# Patient Record
Sex: MALE | Race: WHITE | Employment: OTHER | URBAN - NONMETROPOLITAN AREA
[De-identification: names, ages, dates, MRNs, and addresses within clinical notes are randomized per-mention and may not be internally consistent; named-entity substitution may affect disease eponyms.]

---

## 2018-04-09 ENCOUNTER — DOCTOR'S OFFICE (OUTPATIENT)
Dept: URBAN - NONMETROPOLITAN AREA CLINIC 1 | Facility: CLINIC | Age: 68
Setting detail: OPHTHALMOLOGY
End: 2018-04-09
Payer: COMMERCIAL

## 2018-04-09 ENCOUNTER — RX ONLY (RX ONLY)
Age: 68
End: 2018-04-09

## 2018-04-09 DIAGNOSIS — S01.102A: ICD-10-CM

## 2018-04-09 DIAGNOSIS — H43.812: ICD-10-CM

## 2018-04-09 DIAGNOSIS — H43.813: ICD-10-CM

## 2018-04-09 DIAGNOSIS — H43.811: ICD-10-CM

## 2018-04-09 PROCEDURE — 92225 OPHTHALMOSCOPY EXTENDED INITIAL: CPT | Performed by: OPHTHALMOLOGY

## 2018-04-09 PROCEDURE — 99214 OFFICE O/P EST MOD 30 MIN: CPT | Performed by: OPHTHALMOLOGY

## 2018-04-09 PROCEDURE — 92134 CPTRZ OPH DX IMG PST SGM RTA: CPT | Performed by: OPHTHALMOLOGY

## 2018-04-09 ASSESSMENT — REFRACTION_MANIFEST
OU_VA: 20/
OD_VA3: 20/
OD_VA2: 20/
OU_VA: 20/
OS_VA2: 20/
OD_VA3: 20/
OS_VA2: 20/
OD_VA1: 20/
OS_VA2: 20/
OU_VA: 20/
OD_VA1: 20/
OD_VA2: 20/
OS_VA3: 20/
OD_VA1: 20/
OS_VA1: 20/
OS_VA3: 20/
OS_VA3: 20/
OD_VA2: 20/
OD_VA3: 20/
OS_VA1: 20/
OS_VA1: 20/

## 2018-04-09 ASSESSMENT — REFRACTION_CURRENTRX
OS_OVR_VA: 20/
OD_OVR_VA: 20/
OS_OVR_VA: 20/
OD_OVR_VA: 20/
OD_OVR_VA: 20/
OS_OVR_VA: 20/

## 2018-04-09 ASSESSMENT — CORNEAL TRAUMA - ABRASION: OD_ABRASION: (-)

## 2018-04-09 ASSESSMENT — VISUAL ACUITY
OS_BCVA: 20/20-1
OD_BCVA: 20/25

## 2018-04-09 ASSESSMENT — CONFRONTATIONAL VISUAL FIELD TEST (CVF)
OD_FINDINGS: FULL
OS_FINDINGS: FULL

## 2018-04-12 ENCOUNTER — DOCTOR'S OFFICE (OUTPATIENT)
Dept: URBAN - NONMETROPOLITAN AREA CLINIC 1 | Facility: CLINIC | Age: 68
Setting detail: OPHTHALMOLOGY
End: 2018-04-12
Payer: COMMERCIAL

## 2018-04-12 DIAGNOSIS — S01.102S: ICD-10-CM

## 2018-04-12 DIAGNOSIS — H43.813: ICD-10-CM

## 2018-04-12 PROCEDURE — 92250 FUNDUS PHOTOGRAPHY W/I&R: CPT | Performed by: OPHTHALMOLOGY

## 2018-04-12 PROCEDURE — 92014 COMPRE OPH EXAM EST PT 1/>: CPT | Performed by: OPHTHALMOLOGY

## 2018-04-12 PROCEDURE — 92235 FLUORESCEIN ANGRPH MLTIFRAME: CPT | Performed by: OPHTHALMOLOGY

## 2018-04-12 ASSESSMENT — REFRACTION_MANIFEST
OU_VA: 20/
OD_VA2: 20/
OS_VA3: 20/
OS_VA3: 20/
OS_VA2: 20/
OS_VA1: 20/
OD_VA1: 20/
OS_VA2: 20/
OS_VA1: 20/
OD_VA2: 20/
OS_VA2: 20/
OD_VA3: 20/
OD_VA1: 20/
OD_VA3: 20/
OD_VA2: 20/
OD_VA1: 20/
OU_VA: 20/
OS_VA3: 20/
OD_VA3: 20/
OS_VA1: 20/
OU_VA: 20/

## 2018-04-12 ASSESSMENT — REFRACTION_CURRENTRX
OS_OVR_VA: 20/
OS_OVR_VA: 20/
OD_OVR_VA: 20/
OS_OVR_VA: 20/
OD_OVR_VA: 20/
OD_OVR_VA: 20/

## 2018-04-12 ASSESSMENT — CONFRONTATIONAL VISUAL FIELD TEST (CVF)
OS_FINDINGS: FULL
OD_FINDINGS: FULL

## 2018-04-12 ASSESSMENT — VISUAL ACUITY
OS_BCVA: 20/25-
OD_BCVA: 20/30-

## 2018-04-12 ASSESSMENT — CORNEAL TRAUMA - ABRASION: OD_ABRASION: (-)

## 2018-04-23 ENCOUNTER — DOCTOR'S OFFICE (OUTPATIENT)
Dept: URBAN - NONMETROPOLITAN AREA CLINIC 1 | Facility: CLINIC | Age: 68
Setting detail: OPHTHALMOLOGY
End: 2018-04-23
Payer: COMMERCIAL

## 2018-04-23 DIAGNOSIS — H40.033: ICD-10-CM

## 2018-04-23 DIAGNOSIS — H43.813: ICD-10-CM

## 2018-04-23 DIAGNOSIS — S01.102A: ICD-10-CM

## 2018-04-23 PROCEDURE — 92132 CPTRZD OPH DX IMG ANT SGM: CPT | Performed by: OPHTHALMOLOGY

## 2018-04-23 PROCEDURE — 92020 GONIOSCOPY: CPT | Performed by: OPHTHALMOLOGY

## 2018-04-23 PROCEDURE — 92012 INTRM OPH EXAM EST PATIENT: CPT | Performed by: OPHTHALMOLOGY

## 2018-04-23 ASSESSMENT — REFRACTION_MANIFEST
OS_VA1: 20/
OS_VA2: 20/
OD_VA2: 20/
OU_VA: 20/
OS_VA1: 20/
OD_VA3: 20/
OS_VA3: 20/
OU_VA: 20/
OD_VA2: 20/
OD_VA3: 20/
OD_VA1: 20/
OS_VA2: 20/
OS_VA3: 20/
OD_VA1: 20/
OU_VA: 20/
OS_VA3: 20/
OD_VA3: 20/
OD_VA2: 20/
OD_VA1: 20/
OS_VA1: 20/
OS_VA2: 20/

## 2018-04-23 ASSESSMENT — REFRACTION_CURRENTRX
OD_OVR_VA: 20/
OS_OVR_VA: 20/
OD_OVR_VA: 20/
OS_OVR_VA: 20/
OD_OVR_VA: 20/
OS_OVR_VA: 20/

## 2018-04-23 ASSESSMENT — VISUAL ACUITY
OS_BCVA: 20/25-2
OD_BCVA: 20/25-2

## 2018-04-23 ASSESSMENT — CORNEAL TRAUMA - ABRASION: OD_ABRASION: (-)

## 2018-04-23 ASSESSMENT — CONFRONTATIONAL VISUAL FIELD TEST (CVF)
OS_FINDINGS: FULL
OD_FINDINGS: FULL

## 2018-04-25 ENCOUNTER — AMBUL SURGICAL CARE (OUTPATIENT)
Dept: URBAN - METROPOLITAN AREA SURGERY 29 | Facility: SURGERY | Age: 68
Setting detail: OPHTHALMOLOGY
End: 2018-04-25
Payer: COMMERCIAL

## 2018-04-25 DIAGNOSIS — H40.031: ICD-10-CM

## 2018-04-25 PROCEDURE — 66761 REVISION OF IRIS: CPT | Performed by: OPHTHALMOLOGY

## 2018-04-25 PROCEDURE — G8918 PT W/O PREOP ORDER IV AB PRO: HCPCS | Performed by: OPHTHALMOLOGY

## 2018-04-25 PROCEDURE — G8907 PT DOC NO EVENTS ON DISCHARG: HCPCS | Performed by: OPHTHALMOLOGY

## 2018-04-27 ENCOUNTER — AMBUL SURGICAL CARE (OUTPATIENT)
Dept: URBAN - METROPOLITAN AREA SURGERY 29 | Facility: SURGERY | Age: 68
Setting detail: OPHTHALMOLOGY
End: 2018-04-27
Payer: COMMERCIAL

## 2018-04-27 DIAGNOSIS — H40.032: ICD-10-CM

## 2018-04-27 PROCEDURE — G8907 PT DOC NO EVENTS ON DISCHARG: HCPCS | Performed by: OPHTHALMOLOGY

## 2018-04-27 PROCEDURE — G8918 PT W/O PREOP ORDER IV AB PRO: HCPCS | Performed by: OPHTHALMOLOGY

## 2018-04-27 PROCEDURE — 66761 REVISION OF IRIS: CPT | Performed by: OPHTHALMOLOGY

## 2018-05-18 ENCOUNTER — DOCTOR'S OFFICE (OUTPATIENT)
Dept: URBAN - NONMETROPOLITAN AREA CLINIC 1 | Facility: CLINIC | Age: 68
Setting detail: OPHTHALMOLOGY
End: 2018-05-18
Payer: COMMERCIAL

## 2018-05-18 ENCOUNTER — RX ONLY (RX ONLY)
Age: 68
End: 2018-05-18

## 2018-05-18 DIAGNOSIS — H01.001: ICD-10-CM

## 2018-05-18 DIAGNOSIS — H10.413: ICD-10-CM

## 2018-05-18 DIAGNOSIS — H01.002: ICD-10-CM

## 2018-05-18 DIAGNOSIS — H10.503: ICD-10-CM

## 2018-05-18 DIAGNOSIS — H10.501: ICD-10-CM

## 2018-05-18 DIAGNOSIS — H01.004: ICD-10-CM

## 2018-05-18 DIAGNOSIS — H01.005: ICD-10-CM

## 2018-05-18 DIAGNOSIS — H10.502: ICD-10-CM

## 2018-05-18 PROCEDURE — 92012 INTRM OPH EXAM EST PATIENT: CPT | Performed by: OPHTHALMOLOGY

## 2018-05-18 PROCEDURE — 83861 MICROFLUID ANALY TEARS: CPT | Performed by: OPHTHALMOLOGY

## 2018-05-18 ASSESSMENT — REFRACTION_MANIFEST
OD_VA3: 20/
OS_VA3: 20/
OS_VA1: 20/
OU_VA: 20/
OD_VA3: 20/
OD_VA1: 20/
OS_VA2: 20/
OD_VA2: 20/
OS_VA2: 20/
OD_VA3: 20/
OS_VA2: 20/
OU_VA: 20/
OS_VA1: 20/
OD_VA2: 20/
OD_VA1: 20/
OD_VA2: 20/
OD_VA1: 20/
OS_VA1: 20/
OU_VA: 20/

## 2018-05-18 ASSESSMENT — REFRACTION_CURRENTRX
OD_OVR_VA: 20/
OS_OVR_VA: 20/
OS_OVR_VA: 20/
OD_OVR_VA: 20/
OS_OVR_VA: 20/
OD_OVR_VA: 20/

## 2018-05-18 ASSESSMENT — VISUAL ACUITY
OD_BCVA: 20/30
OS_BCVA: 20/30

## 2018-05-18 ASSESSMENT — CONFRONTATIONAL VISUAL FIELD TEST (CVF)
OS_FINDINGS: FULL
OD_FINDINGS: FULL

## 2018-05-18 ASSESSMENT — LID EXAM ASSESSMENTS
OD_BLEPHARITIS: RLL RUL 1+ 2+
OS_BLEPHARITIS: LLL LUL 1+ 2+

## 2018-05-18 ASSESSMENT — CORNEAL TRAUMA - ABRASION: OD_ABRASION: (-)

## 2018-05-21 ENCOUNTER — RX ONLY (RX ONLY)
Age: 68
End: 2018-05-21

## 2018-05-21 ENCOUNTER — DOCTOR'S OFFICE (OUTPATIENT)
Dept: URBAN - NONMETROPOLITAN AREA CLINIC 1 | Facility: CLINIC | Age: 68
Setting detail: OPHTHALMOLOGY
End: 2018-05-21
Payer: COMMERCIAL

## 2018-05-21 DIAGNOSIS — H10.502: ICD-10-CM

## 2018-05-21 DIAGNOSIS — H43.813: ICD-10-CM

## 2018-05-21 DIAGNOSIS — H01.001: ICD-10-CM

## 2018-05-21 DIAGNOSIS — H26.033: ICD-10-CM

## 2018-05-21 DIAGNOSIS — H16.223: ICD-10-CM

## 2018-05-21 DIAGNOSIS — H40.033: ICD-10-CM

## 2018-05-21 DIAGNOSIS — H01.005: ICD-10-CM

## 2018-05-21 DIAGNOSIS — H01.002: ICD-10-CM

## 2018-05-21 DIAGNOSIS — H01.004: ICD-10-CM

## 2018-05-21 DIAGNOSIS — H10.501: ICD-10-CM

## 2018-05-21 PROCEDURE — 92014 COMPRE OPH EXAM EST PT 1/>: CPT | Performed by: OPHTHALMOLOGY

## 2018-05-21 PROCEDURE — 95004 PERQ TESTS W/ALRGNC XTRCS: CPT | Performed by: OPHTHALMOLOGY

## 2018-05-21 ASSESSMENT — REFRACTION_MANIFEST
OS_VA1: 20/
OS_VA1: 20/
OD_VA2: 20/
OS_VA3: 20/
OS_VA1: 20/
OS_VA2: 20/
OS_VA3: 20/
OD_VA3: 20/
OD_VA3: 20/
OU_VA: 20/
OD_VA1: 20/
OD_VA1: 20/
OS_VA2: 20/
OU_VA: 20/
OD_VA1: 20/
OU_VA: 20/
OS_VA3: 20/
OD_VA2: 20/
OS_VA2: 20/
OD_VA2: 20/
OD_VA3: 20/

## 2018-05-21 ASSESSMENT — CONFRONTATIONAL VISUAL FIELD TEST (CVF)
OS_FINDINGS: FULL
OD_FINDINGS: FULL

## 2018-05-21 ASSESSMENT — REFRACTION_CURRENTRX
OS_OVR_VA: 20/
OD_OVR_VA: 20/
OS_OVR_VA: 20/
OD_OVR_VA: 20/
OD_OVR_VA: 20/
OS_OVR_VA: 20/

## 2018-05-21 ASSESSMENT — CORNEAL TRAUMA - ABRASION: OD_ABRASION: (-)

## 2018-05-21 ASSESSMENT — LID EXAM ASSESSMENTS
OS_BLEPHARITIS: LLL LUL 1+ 2+
OD_BLEPHARITIS: RLL RUL 1+ 2+

## 2018-05-21 ASSESSMENT — VISUAL ACUITY
OD_BCVA: 20/30-1
OS_BCVA: 20/30

## 2018-09-29 ENCOUNTER — OFFICE VISIT (OUTPATIENT)
Dept: URGENT CARE | Facility: CLINIC | Age: 68
End: 2018-09-29
Payer: COMMERCIAL

## 2018-09-29 VITALS
DIASTOLIC BLOOD PRESSURE: 80 MMHG | HEART RATE: 69 BPM | WEIGHT: 215 LBS | TEMPERATURE: 98.1 F | RESPIRATION RATE: 16 BRPM | BODY MASS INDEX: 30.78 KG/M2 | OXYGEN SATURATION: 99 % | SYSTOLIC BLOOD PRESSURE: 131 MMHG | HEIGHT: 70 IN

## 2018-09-29 DIAGNOSIS — K08.89 PAIN, DENTAL: Primary | ICD-10-CM

## 2018-09-29 PROCEDURE — 99203 OFFICE O/P NEW LOW 30 MIN: CPT | Performed by: EMERGENCY MEDICINE

## 2018-09-29 PROCEDURE — S9088 SERVICES PROVIDED IN URGENT: HCPCS | Performed by: EMERGENCY MEDICINE

## 2018-09-29 RX ORDER — CLINDAMYCIN HYDROCHLORIDE 150 MG/1
150 CAPSULE ORAL EVERY 8 HOURS SCHEDULED
Qty: 21 CAPSULE | Refills: 0 | Status: SHIPPED | OUTPATIENT
Start: 2018-09-29 | End: 2018-10-06

## 2018-09-29 NOTE — PATIENT INSTRUCTIONS
Acute Dental Trauma   AMBULATORY CARE:   Acute dental trauma  is a serious injury to one or more parts of your mouth  Your injury may include damage to any of your teeth, the tooth socket, the tooth root, or your jaw  You can also have injuries to the soft tissues of your mouth  These include your tongue, cheeks, gums, and lips  Severe injuries can expose the soft pulp inside the tooth  Common signs and symptoms include the following:   · A tooth that is cracked, chipped, loose, out of place, or missing    · A sharp or rough edge on your tooth    · Bleeding from your gums, lips, face, or mouth    · Trouble moving your jaw or mouth    · A change in the way your teeth fit together when you close your mouth  Call 911 for any of the following:   · You have trouble breathing  Seek care immediately if:   · You lose one or more of your teeth, or your tooth moves out of place  · You have severe bleeding in your mouth that does not stop  Contact your healthcare provider if:   · You have a fever  · You have new symptoms, or your symptoms become worse  · You feel pain when air gets in contact with your damaged tooth  · You have tooth pain when you eat foods that are hot, cold, sweet, or sour  · Your tooth's color becomes darker  · You have questions or concern about your condition or care  Treatment  may depend on what part of your mouth is injured  Medicine may be given to decrease pain and prevent an infection  You may need stitches to repair large cuts or openings in your skin or mouth  You may also need a tetanus shot to prevent bacteria from getting into your wound  You may need surgery to replace or repair a broken tooth  Medicines: You may  need any of the following:  · Antibiotics  help treat or prevent a bacterial infection  · Acetaminophen  decreases pain and fever  It is available without a doctor's order  Ask how much to take and how often to take it  Follow directions   Read the labels of all other medicines you are using to see if they also contain acetaminophen, or ask your doctor or pharmacist  Acetaminophen can cause liver damage if not taken correctly  Do not use more than 4 grams (4,000 milligrams) total of acetaminophen in one day  · Take your medicine as directed  Contact your healthcare provider if you think your medicine is not helping or if you have side effects  Tell him or her if you are allergic to any medicine  Keep a list of the medicines, vitamins, and herbs you take  Include the amounts, and when and why you take them  Bring the list or the pill bottles to follow-up visits  Carry your medicine list with you in case of an emergency  Self-care:   · Apply ice  on your jaw or cheek for 15 to 20 minutes every hour or as directed  Use an ice pack, or put crushed ice in a plastic bag  Cover it with a towel  Ice helps prevent tissue damage and decreases swelling and pain  · Do not use your damaged tooth  Chewing food on your damaged tooth may put too much pressure on it and worsen your injury  · Eat soft foods or drink liquids  Soft foods and liquids may be easier to eat until your injury heals  Soft foods include applesauce, pudding, mashed potatoes, gelatin, or ice cream      · Keep your wounds clean  Use prescribed mouthwash as directed or gargle with a salt water solution  Mix 1 teaspoon of salt and 1 cup of warm water  You can also clean your wounds with hydrogen peroxide swabs  Ask your healthcare provider for more information on how to clean your wounds  · Wear protective gear when you play sports  Always wear a helmet and mouth guard that meet safety standards  These will prevent damage to your gums, teeth, and the bones that support your mouth  Follow up with your healthcare provider as directed:  Write down your questions so you remember to ask them during your visits    © 2017 [employer] Information is for End User's use only and may not be sold, redistributed or otherwise used for commercial purposes  All illustrations and images included in CareNotes® are the copyrighted property of [employer]  or Jacob[employer] Fitzpatrick  The above information is an  only  It is not intended as medical advice for individual conditions or treatments  Talk to your doctor, nurse or pharmacist before following any medical regimen to see if it is safe and effective for you

## 2018-09-29 NOTE — PROGRESS NOTES
[facility]        NAME: Katerine Tenorio is a 78 y o  male  : 1950    MRN: [id number]  DATE: 2018  TIME: 1:52 PM    Assessment and Plan   Pain, dental [K08 89]  1  Pain, dental  clindamycin (CLEOCIN) 150 mg capsule     After informed verbal consent dental block 1% lidocaine with epi no complications  Patient Instructions     Patient Instructions   Acute Dental Trauma   AMBULATORY CARE:   Acute dental trauma  is a serious injury to one or more parts of your mouth  Your injury may include damage to any of your teeth, the tooth socket, the tooth root, or your jaw  You can also have injuries to the soft tissues of your mouth  These include your tongue, cheeks, gums, and lips  Severe injuries can expose the soft pulp inside the tooth  Common signs and symptoms include the following:   · A tooth that is cracked, chipped, loose, out of place, or missing    · A sharp or rough edge on your tooth    · Bleeding from your gums, lips, face, or mouth    · Trouble moving your jaw or mouth    · A change in the way your teeth fit together when you close your mouth  Call 911 for any of the following:   · You have trouble breathing  Seek care immediately if:   · You lose one or more of your teeth, or your tooth moves out of place  · You have severe bleeding in your mouth that does not stop  Contact your healthcare provider if:   · You have a fever  · You have new symptoms, or your symptoms become worse  · You feel pain when air gets in contact with your damaged tooth  · You have tooth pain when you eat foods that are hot, cold, sweet, or sour  · Your tooth's color becomes darker  · You have questions or concern about your condition or care  Treatment  may depend on what part of your mouth is injured  Medicine may be given to decrease pain and prevent an infection  You may need stitches to repair large cuts or openings in your skin or mouth   You may also need a tetanus shot to prevent bacteria from getting into your wound  You may need surgery to replace or repair a broken tooth  Medicines: You may  need any of the following:  · Antibiotics  help treat or prevent a bacterial infection  · Acetaminophen  decreases pain and fever  It is available without a doctor's order  Ask how much to take and how often to take it  Follow directions  Read the labels of all other medicines you are using to see if they also contain acetaminophen, or ask your doctor or pharmacist  Acetaminophen can cause liver damage if not taken correctly  Do not use more than 4 grams (4,000 milligrams) total of acetaminophen in one day  · Take your medicine as directed  Contact your healthcare provider if you think your medicine is not helping or if you have side effects  Tell him or her if you are allergic to any medicine  Keep a list of the medicines, vitamins, and herbs you take  Include the amounts, and when and why you take them  Bring the list or the pill bottles to follow-up visits  Carry your medicine list with you in case of an emergency  Self-care:   · Apply ice  on your jaw or cheek for 15 to 20 minutes every hour or as directed  Use an ice pack, or put crushed ice in a plastic bag  Cover it with a towel  Ice helps prevent tissue damage and decreases swelling and pain  · Do not use your damaged tooth  Chewing food on your damaged tooth may put too much pressure on it and worsen your injury  · Eat soft foods or drink liquids  Soft foods and liquids may be easier to eat until your injury heals  Soft foods include applesauce, pudding, mashed potatoes, gelatin, or ice cream      · Keep your wounds clean  Use prescribed mouthwash as directed or gargle with a salt water solution  Mix 1 teaspoon of salt and 1 cup of warm water  You can also clean your wounds with hydrogen peroxide swabs  Ask your healthcare provider for more information on how to clean your wounds      · Wear protective gear when you play sports  Always wear a helmet and mouth guard that meet safety standards  These will prevent damage to your gums, teeth, and the bones that support your mouth  Follow up with your healthcare provider as directed:  Write down your questions so you remember to ask them during your visits  © 2017 [employer] Information is for End User's use only and may not be sold, redistributed or otherwise used for commercial purposes  All illustrations and images included in CareNotes® are the copyrighted property of [employer]  or [employer] Fitzpatrick  The above information is an  only  It is not intended as medical advice for individual conditions or treatments  Talk to your doctor, nurse or pharmacist before following any medical regimen to see if it is safe and effective for you  Follow up with PCP in 3-5 days  Proceed to  ER if symptoms worsen  Chief Complaint     Chief Complaint   Patient presents with    Dental Pain     broke tooth a couple of days ago, cant get into dentist until monday         History of Present Illness       Patient complains of worsening pain left lower tooth since the tooth broke off few days ago  He called the dentist but is unable to get in until 2 days from now  He denies fever chills sweats  Review of Systems   Review of Systems   Constitutional: Negative for fever  HENT: Negative for congestion, ear discharge, ear pain, facial swelling, hearing loss, rhinorrhea and sinus pressure  Respiratory: Negative for cough, shortness of breath and wheezing  Gastrointestinal: Negative for diarrhea and vomiting  Musculoskeletal: Negative for neck stiffness  Skin: Negative for pallor  Neurological: Negative for headaches           Current Medications       Current Outpatient Prescriptions:     UNKNOWN TO PATIENT, , Disp: , Rfl:     clindamycin (CLEOCIN) 150 mg capsule, Take 1 capsule (150 mg total) by mouth every 8 (eight) hours for 7 days, Disp: 21 capsule, Rfl: 0    Current Allergies     Allergies as of 09/29/2018 - Reviewed 09/29/2018   Allergen Reaction Noted    Ibuprofen Anaphylaxis 09/29/2018            The following portions of the patient's history were reviewed and updated as appropriate: allergies, current medications, past family history, past medical history, past social history, past surgical history and problem list      Past Medical History:   Diagnosis Date    Heart problem     Hypertension        Past Surgical History:   Procedure Laterality Date    APPENDECTOMY      CAROTID ARTERY ANGIOPLASTY      CARPAL TUNNEL RELEASE         No family history on file  Medications have been verified  Objective   /80   Pulse 69   Temp 98 1 °F (36 7 °C) (Tympanic)   Resp 16   Ht 5' 10" (1 778 m)   Wt 97 5 kg (215 lb)   SpO2 99%   BMI 30 85 kg/m²        Physical Exam     Physical Exam   Constitutional: He is oriented to person, place, and time  He appears well-developed and well-nourished  No distress  HENT:   Head: Normocephalic and atraumatic  Right Ear: Tympanic membrane, external ear and ear canal normal    Left Ear: Tympanic membrane, external ear and ear canal normal    Nose: Mucosal edema and rhinorrhea present  Mouth/Throat: No oral lesions  Abnormal dentition  Dental caries present  No dental abscesses or uvula swelling  No posterior oropharyngeal erythema  Eyes: Pupils are equal, round, and reactive to light  Conjunctivae are normal    Neck: Neck supple  Cardiovascular: Normal rate, regular rhythm and normal heart sounds  Pulmonary/Chest: Effort normal and breath sounds normal    Abdominal: Soft  Bowel sounds are normal    Musculoskeletal: Normal range of motion  Neurological: He is alert and oriented to person, place, and time  Skin: Skin is warm and dry  He is not diaphoretic  No pallor  Psychiatric: He has a normal mood and affect     Nursing note and vitals reviewed

## 2018-10-19 ENCOUNTER — DOCTOR'S OFFICE (OUTPATIENT)
Dept: URBAN - NONMETROPOLITAN AREA CLINIC 1 | Facility: CLINIC | Age: 68
Setting detail: OPHTHALMOLOGY
End: 2018-10-19
Payer: COMMERCIAL

## 2018-10-19 DIAGNOSIS — H16.222: ICD-10-CM

## 2018-10-19 DIAGNOSIS — H43.813: ICD-10-CM

## 2018-10-19 DIAGNOSIS — H43.811: ICD-10-CM

## 2018-10-19 DIAGNOSIS — H40.033: ICD-10-CM

## 2018-10-19 DIAGNOSIS — H26.033: ICD-10-CM

## 2018-10-19 DIAGNOSIS — H16.223: ICD-10-CM

## 2018-10-19 DIAGNOSIS — H43.812: ICD-10-CM

## 2018-10-19 PROCEDURE — 92014 COMPRE OPH EXAM EST PT 1/>: CPT | Performed by: OPHTHALMOLOGY

## 2018-10-19 PROCEDURE — 83861 MICROFLUID ANALY TEARS: CPT | Performed by: OPHTHALMOLOGY

## 2018-10-19 PROCEDURE — 92226 OPHTHALMOSCOPY EXT SUBSEQUENT: CPT | Performed by: OPHTHALMOLOGY

## 2018-10-19 PROCEDURE — 92134 CPTRZ OPH DX IMG PST SGM RTA: CPT | Performed by: OPHTHALMOLOGY

## 2018-10-19 ASSESSMENT — VISUAL ACUITY
OS_BCVA: 20/25-1
OD_BCVA: 20/30-1

## 2018-10-19 ASSESSMENT — REFRACTION_CURRENTRX
OS_OVR_VA: 20/
OD_OVR_VA: 20/

## 2018-10-19 ASSESSMENT — REFRACTION_MANIFEST
OD_VA2: 20/
OS_VA3: 20/
OS_VA2: 20/
OS_VA1: 20/
OD_VA2: 20/
OU_VA: 20/
OS_VA3: 20/
OU_VA: 20/
OS_VA1: 20/
OD_VA1: 20/
OS_VA2: 20/
OD_VA1: 20/
OD_VA3: 20/
OD_VA3: 20/

## 2018-10-19 ASSESSMENT — CORNEAL TRAUMA - ABRASION: OD_ABRASION: (-)

## 2018-10-19 ASSESSMENT — CONFRONTATIONAL VISUAL FIELD TEST (CVF)
OD_FINDINGS: FULL
OS_FINDINGS: FULL

## 2018-10-19 ASSESSMENT — LID EXAM ASSESSMENTS
OS_BLEPHARITIS: LLL LUL 1+ 2+
OD_BLEPHARITIS: RLL RUL 1+ 2+

## 2021-04-08 DIAGNOSIS — Z23 ENCOUNTER FOR IMMUNIZATION: ICD-10-CM

## 2021-08-24 ENCOUNTER — RX ONLY (RX ONLY)
Age: 71
End: 2021-08-24

## 2021-08-24 ENCOUNTER — DOCTOR'S OFFICE (OUTPATIENT)
Dept: URBAN - NONMETROPOLITAN AREA CLINIC 1 | Facility: CLINIC | Age: 71
Setting detail: OPHTHALMOLOGY
End: 2021-08-24
Payer: COMMERCIAL

## 2021-08-24 VITALS — HEIGHT: 60 IN

## 2021-08-24 DIAGNOSIS — B30.3: ICD-10-CM

## 2021-08-24 DIAGNOSIS — B30.1: ICD-10-CM

## 2021-08-24 DIAGNOSIS — B30.0: ICD-10-CM

## 2021-08-24 DIAGNOSIS — B30.2: ICD-10-CM

## 2021-08-24 PROCEDURE — 92012 INTRM OPH EXAM EST PATIENT: CPT | Performed by: OPHTHALMOLOGY

## 2021-08-24 ASSESSMENT — CONFRONTATIONAL VISUAL FIELD TEST (CVF)
OD_FINDINGS: FULL
OS_FINDINGS: FULL

## 2021-08-24 ASSESSMENT — VISUAL ACUITY
OD_BCVA: 20/30-2
OS_BCVA: 20/30+1

## 2021-10-01 ENCOUNTER — DOCTOR'S OFFICE (OUTPATIENT)
Dept: URBAN - NONMETROPOLITAN AREA CLINIC 1 | Facility: CLINIC | Age: 71
Setting detail: OPHTHALMOLOGY
End: 2021-10-01
Payer: COMMERCIAL

## 2021-10-01 DIAGNOSIS — B30.3: ICD-10-CM

## 2021-10-01 DIAGNOSIS — B30.0: ICD-10-CM

## 2021-10-01 DIAGNOSIS — B30.1: ICD-10-CM

## 2021-10-01 DIAGNOSIS — H26.033: ICD-10-CM

## 2021-10-01 DIAGNOSIS — B30.2: ICD-10-CM

## 2021-10-01 PROCEDURE — 92014 COMPRE OPH EXAM EST PT 1/>: CPT | Performed by: OPHTHALMOLOGY

## 2021-10-01 ASSESSMENT — LID EXAM ASSESSMENTS
OD_BLEPHARITIS: ABSENT
OS_BLEPHARITIS: ABSENT

## 2021-10-01 ASSESSMENT — CONFRONTATIONAL VISUAL FIELD TEST (CVF)
OS_FINDINGS: FULL
OD_FINDINGS: FULL

## 2021-10-05 ASSESSMENT — KERATOMETRY
OS_K1POWER_DIOPTERS: 40.50
OS_K2POWER_DIOPTERS: 41.25
OS_AXISANGLE_DEGREES: 113
OD_AXISANGLE_DEGREES: 042
OD_K1POWER_DIOPTERS: 40.75
OD_K2POWER_DIOPTERS: 41.00

## 2021-10-05 ASSESSMENT — REFRACTION_AUTOREFRACTION
OD_SPHERE: +2.00
OS_CYLINDER: 0.00
OS_SPHERE: +0.50
OD_AXIS: 162
OD_CYLINDER: -1.75

## 2021-10-05 ASSESSMENT — VISUAL ACUITY
OD_BCVA: 20/25-1
OS_BCVA: 20/25-1

## 2021-10-05 ASSESSMENT — SPHEQUIV_DERIVED
OS_SPHEQUIV: 0.5
OD_SPHEQUIV: 1.125

## 2021-10-05 ASSESSMENT — AXIALLENGTH_DERIVED
OD_AL: 24.1298
OS_AL: 24.3872

## 2021-12-07 ENCOUNTER — DOCTOR'S OFFICE (OUTPATIENT)
Dept: URBAN - NONMETROPOLITAN AREA CLINIC 1 | Facility: CLINIC | Age: 71
Setting detail: OPHTHALMOLOGY
End: 2021-12-07
Payer: COMMERCIAL

## 2021-12-07 DIAGNOSIS — H25.12: ICD-10-CM

## 2021-12-07 PROCEDURE — 92136 OPHTHALMIC BIOMETRY: CPT | Performed by: OPHTHALMOLOGY

## 2021-12-14 ENCOUNTER — AMBUL SURGICAL CARE (OUTPATIENT)
Dept: URBAN - NONMETROPOLITAN AREA SURGERY 1 | Facility: SURGERY | Age: 71
Setting detail: OPHTHALMOLOGY
End: 2021-12-14
Payer: COMMERCIAL

## 2021-12-14 DIAGNOSIS — H25.12: ICD-10-CM

## 2021-12-14 DIAGNOSIS — H25.012: ICD-10-CM

## 2021-12-14 PROCEDURE — G8918 PT W/O PREOP ORDER IV AB PRO: HCPCS | Performed by: OPHTHALMOLOGY

## 2021-12-14 PROCEDURE — G8907 PT DOC NO EVENTS ON DISCHARG: HCPCS | Performed by: OPHTHALMOLOGY

## 2021-12-14 PROCEDURE — 66984 XCAPSL CTRC RMVL W/O ECP: CPT | Performed by: OPHTHALMOLOGY

## 2021-12-15 ENCOUNTER — DOCTOR'S OFFICE (OUTPATIENT)
Dept: URBAN - NONMETROPOLITAN AREA CLINIC 1 | Facility: CLINIC | Age: 71
Setting detail: OPHTHALMOLOGY
End: 2021-12-15
Payer: COMMERCIAL

## 2021-12-15 ENCOUNTER — DOCTOR'S OFFICE (OUTPATIENT)
Dept: URBAN - NONMETROPOLITAN AREA CLINIC 1 | Facility: CLINIC | Age: 71
Setting detail: OPHTHALMOLOGY
End: 2021-12-15

## 2021-12-15 DIAGNOSIS — H26.031: ICD-10-CM

## 2021-12-15 DIAGNOSIS — Z96.1: ICD-10-CM

## 2021-12-15 PROCEDURE — 99024 POSTOP FOLLOW-UP VISIT: CPT | Performed by: OPTOMETRIST

## 2021-12-15 PROCEDURE — 92136 OPHTHALMIC BIOMETRY: CPT | Performed by: OPHTHALMOLOGY

## 2021-12-15 ASSESSMENT — REFRACTION_AUTOREFRACTION
OS_AXIS: 155
OS_CYLINDER: -2.25
OS_SPHERE: +1.75

## 2021-12-15 ASSESSMENT — KERATOMETRY
OD_K2POWER_DIOPTERS: 41.00
OS_K2POWER_DIOPTERS: 41.25
OS_K1POWER_DIOPTERS: 40.50
OD_K1POWER_DIOPTERS: 40.75
OD_AXISANGLE_DEGREES: 042
OS_AXISANGLE_DEGREES: 113

## 2021-12-15 ASSESSMENT — SPHEQUIV_DERIVED: OS_SPHEQUIV: 0.625

## 2021-12-15 ASSESSMENT — VISUAL ACUITY
OS_BCVA: 20/40-2
OD_BCVA: 20/70

## 2021-12-15 ASSESSMENT — TONOMETRY: OS_IOP_MMHG: 15

## 2021-12-15 ASSESSMENT — LID EXAM ASSESSMENTS
OS_BLEPHARITIS: ABSENT
OD_BLEPHARITIS: ABSENT

## 2021-12-15 ASSESSMENT — AXIALLENGTH_DERIVED: OS_AL: 24.3353

## 2021-12-22 ENCOUNTER — RX ONLY (RX ONLY)
Age: 71
End: 2021-12-22

## 2021-12-22 ENCOUNTER — DOCTOR'S OFFICE (OUTPATIENT)
Dept: URBAN - NONMETROPOLITAN AREA CLINIC 1 | Facility: CLINIC | Age: 71
Setting detail: OPHTHALMOLOGY
End: 2021-12-22
Payer: COMMERCIAL

## 2021-12-22 DIAGNOSIS — Z96.1: ICD-10-CM

## 2021-12-22 DIAGNOSIS — H26.031: ICD-10-CM

## 2021-12-22 PROBLEM — H01.001 BLEPHARITIS; RIGHT UPPER LID, RIGHT LOWER LID, LEFT UPPER LID, LEFT LOWER LID: Status: ACTIVE | Noted: 2018-05-18

## 2021-12-22 PROBLEM — H10.413 ALLERGIC CONJUNCTIVITIS; BOTH EYES: Status: ACTIVE | Noted: 2018-04-09

## 2021-12-22 PROBLEM — H31.001 CHORIORETINAL SCARS; RIGHT EYE: Status: ACTIVE | Noted: 2021-10-01

## 2021-12-22 PROBLEM — B30.1 VIRAL CONJUNCTIVITIS: Status: ACTIVE | Noted: 2021-08-24

## 2021-12-22 PROBLEM — B30.0 VIRAL CONJUNCTIVITIS: Status: ACTIVE | Noted: 2021-08-24

## 2021-12-22 PROBLEM — H43.812 POST VITREOUS DETACHMENT; RIGHT EYE, LEFT EYE: Status: ACTIVE | Noted: 2018-04-09

## 2021-12-22 PROBLEM — H40.033 NARROW ANGLE; BOTH EYES: Status: ACTIVE | Noted: 2018-04-23

## 2021-12-22 PROBLEM — H43.811 POST VITREOUS DETACHMENT; RIGHT EYE, LEFT EYE: Status: ACTIVE | Noted: 2018-04-09

## 2021-12-22 PROBLEM — H01.005 BLEPHARITIS; RIGHT UPPER LID, RIGHT LOWER LID, LEFT UPPER LID, LEFT LOWER LID: Status: ACTIVE | Noted: 2018-05-18

## 2021-12-22 PROBLEM — H16.222 KERATOCONJUNCTIVITIS SICCA; RIGHT EYE, LEFT EYE: Status: ACTIVE | Noted: 2018-04-09

## 2021-12-22 PROBLEM — H01.004 BLEPHARITIS; RIGHT UPPER LID, RIGHT LOWER LID, LEFT UPPER LID, LEFT LOWER LID: Status: ACTIVE | Noted: 2018-05-18

## 2021-12-22 PROBLEM — H43.813 POSTERIOR VITREOUS DETACHMENT; BOTH EYES: Status: ACTIVE | Noted: 2018-10-19

## 2021-12-22 PROBLEM — H16.221 KERATOCONJUNCTIVITIS SICCA; RIGHT EYE, LEFT EYE: Status: ACTIVE | Noted: 2018-04-09

## 2021-12-22 PROBLEM — S01.102A: Status: ACTIVE | Noted: 2018-04-09

## 2021-12-22 PROBLEM — B30.2 VIRAL CONJUNCTIVITIS: Status: ACTIVE | Noted: 2021-08-24

## 2021-12-22 PROBLEM — B30.3 VIRAL CONJUNCTIVITIS: Status: ACTIVE | Noted: 2021-08-24

## 2021-12-22 PROBLEM — H01.002 BLEPHARITIS; RIGHT UPPER LID, RIGHT LOWER LID, LEFT UPPER LID, LEFT LOWER LID: Status: ACTIVE | Noted: 2018-05-18

## 2021-12-22 PROCEDURE — 99024 POSTOP FOLLOW-UP VISIT: CPT | Performed by: OPHTHALMOLOGY

## 2021-12-22 ASSESSMENT — LID EXAM ASSESSMENTS
OD_BLEPHARITIS: ABSENT
OS_BLEPHARITIS: ABSENT

## 2021-12-22 ASSESSMENT — REFRACTION_AUTOREFRACTION
OD_SPHERE: +1.00
OS_CYLINDER: -3.00
OS_SPHERE: +1.25
OS_AXIS: 020
OD_CYLINDER: 0.00

## 2021-12-22 ASSESSMENT — KERATOMETRY
OS_K1POWER_DIOPTERS: 40.25
OS_AXISANGLE_DEGREES: 96
OD_AXISANGLE_DEGREES: 077
OS_K2POWER_DIOPTERS: 41.25
OD_K1POWER_DIOPTERS: 41.00
OD_K2POWER_DIOPTERS: 41.75

## 2021-12-22 ASSESSMENT — VISUAL ACUITY
OS_BCVA: 20/40-2
OD_BCVA: 20/20-1

## 2021-12-22 ASSESSMENT — AXIALLENGTH_DERIVED
OS_AL: 24.754
OD_AL: 23.9894

## 2021-12-22 ASSESSMENT — SPHEQUIV_DERIVED
OS_SPHEQUIV: -0.25
OD_SPHEQUIV: 1

## 2022-05-07 ENCOUNTER — HOSPITAL ENCOUNTER (EMERGENCY)
Facility: HOSPITAL | Age: 72
Discharge: HOME | End: 2022-05-07
Attending: EMERGENCY MEDICINE
Payer: COMMERCIAL

## 2022-05-07 ENCOUNTER — APPOINTMENT (EMERGENCY)
Dept: RADIOLOGY | Facility: HOSPITAL | Age: 72
End: 2022-05-07
Payer: COMMERCIAL

## 2022-05-07 ENCOUNTER — APPOINTMENT (EMERGENCY)
Dept: CT IMAGING | Facility: HOSPITAL | Age: 72
End: 2022-05-07
Payer: COMMERCIAL

## 2022-05-07 VITALS
TEMPERATURE: 97.6 F | SYSTOLIC BLOOD PRESSURE: 128 MMHG | RESPIRATION RATE: 22 BRPM | OXYGEN SATURATION: 94 % | DIASTOLIC BLOOD PRESSURE: 67 MMHG | HEART RATE: 62 BPM

## 2022-05-07 DIAGNOSIS — M67.911 ROTATOR CUFF DYSFUNCTION, RIGHT: Primary | ICD-10-CM

## 2022-05-07 LAB — GLUCOSE SERPL-MCNC: 88 MG/DL (ref 65–140)

## 2022-05-07 PROCEDURE — 93005 ELECTROCARDIOGRAM TRACING: CPT

## 2022-05-07 PROCEDURE — 99285 EMERGENCY DEPT VISIT HI MDM: CPT | Performed by: EMERGENCY MEDICINE

## 2022-05-07 PROCEDURE — 70450 CT HEAD/BRAIN W/O DYE: CPT

## 2022-05-07 PROCEDURE — 73030 X-RAY EXAM OF SHOULDER: CPT

## 2022-05-07 PROCEDURE — 72125 CT NECK SPINE W/O DYE: CPT

## 2022-05-07 PROCEDURE — 99284 EMERGENCY DEPT VISIT MOD MDM: CPT

## 2022-05-07 PROCEDURE — 71045 X-RAY EXAM CHEST 1 VIEW: CPT

## 2022-05-07 PROCEDURE — 82948 REAGENT STRIP/BLOOD GLUCOSE: CPT

## 2022-05-07 RX ORDER — ACETAMINOPHEN 325 MG/1
650 TABLET ORAL ONCE
Status: COMPLETED | OUTPATIENT
Start: 2022-05-07 | End: 2022-05-07

## 2022-05-07 RX ADMIN — ACETAMINOPHEN 650 MG: 325 TABLET ORAL at 16:33

## 2022-05-07 NOTE — DISCHARGE INSTRUCTIONS
Please use sling while awake for next several days  Do not sleep in it  We recommend follow-up with orthopedics  Use Tylenol for pain  Apply ice to the area 4 to 6 times a day for 15 to 20 minutes at a time  Return with any worsening  Your imaging studies have been preliminarily reviewed by the emergency department  Further review by Radiology is pending at this time  If there is a discrepancy or a finding of additional concern identified, we will attempt to contact you at the number you have provided us  If you do not hear from us, follow-up with your primary care provider within 1-2 weeks is always recommended to ensure that all findings were normal or as initially reported    Your results may also be available on [facility]

## 2022-05-07 NOTE — ED PROVIDER NOTES
Emergency Department Trauma Note  Shena Cerda 80 y o  male MRN: [id number]  Unit/Bed#: ED 07/ED 07 Encounter: [id number]      Trauma Alert: Trauma Acuity: Trauma Evaluation  Model of Arrival: Mode of Arrival: Direct from scene via    Trauma Team: Current Providers  Attending Provider: Sridhar Ervin DO  Registered Nurse: Efren Hernandez RN  Consultants:     None      History of Present Illness     Chief Complaint:   Chief Complaint   Patient presents with    Fall     Pt fell in wet grass, rolled down hill in yard, pt reports head ache neck pain and right shoulder pain pt is on aspirin daily      HPI:  Shena Cerda is a 80 y o  male who presents with   Mechanism:Details of Incident: patient slid on wet grass and fell down hill, struck head and right shoulder  Injury Date: 05/07/22 Injury Time: 1700 Injury Occurence Location - [address] [place] [address] Way: sanjana     71-year-old male presents emergency room for evaluation of his right shoulder and neck after he apparently slid and fell down of 5 ft embankment yesterday  Patient reports that he fell onto his right side  Patient reports that he was walking along the road picking up cans when he slipped and fell  He did not strike his head  He did not lose consciousness  He was ambulatory after the incident  Tried use ice on his right shoulder yesterday but reports that it became more severe with pain today and elected to come to the emergency room for further evaluation  Patient reports that he has a history of previous blunt trauma to the right shoulder which caused a clavicle fracture when he was younger  He reports that it was never operated upon  Patient denies any chest pain or shortness of breath at this time  He denies any abdominal pain  He has had no nausea vomiting or difficulty ambulating  Patient reports he drove [facility] emergency room without difficulty    Patient is on anti-platelet therapy secondary to previous carotid endarterectomy on the right       History provided by:  Patient  Fall  Mechanism of injury: fall    Injury location:  Shoulder/arm and head/neck  Head/neck injury location:  R neck  Shoulder/arm injury location:  R shoulder  Incident location:  Street  Arrived directly from scene: no    Fall:     Fall occurred:  Walking and tripped    Impact surface:  Grass    Point of impact:  Unable to specify  Suspicion of alcohol use: no    Suspicion of drug use: no    Prior to arrival data:     Bystander interventions:  None    Blood loss:  None    Responsiveness at scene:  Alert    Orientation at scene:  Person, place, situation and time    Loss of consciousness: no      Amnesic to event: no      Airway condition since incident:  Stable    Breathing condition since incident:  Stable    Circulation condition since incident:  Stable    Mental status condition since incident:  Stable    Disability condition since incident:  Stable  Associated symptoms: neck pain    Associated symptoms: no abdominal pain, no back pain, no chest pain, no difficulty breathing, no headaches, no loss of consciousness, no nausea, no seizures and no vomiting      Review of Systems   Constitutional: Negative  Negative for activity change and diaphoresis  HENT: Negative  Negative for dental problem and trouble swallowing  Eyes: Negative  Negative for visual disturbance  Respiratory: Negative  Negative for chest tightness, shortness of breath and wheezing  Cardiovascular: Negative  Negative for chest pain and palpitations  Gastrointestinal: Negative  Negative for abdominal distention, abdominal pain, nausea and vomiting  Genitourinary: Negative  Negative for flank pain and hematuria  Musculoskeletal: Positive for arthralgias and neck pain  Negative for back pain, gait problem, joint swelling and myalgias  Skin: Negative  Negative for wound  Neurological: Negative    Negative for dizziness, seizures, loss of consciousness, light-headedness and headaches  Hematological: Negative  Does not bruise/bleed easily  Psychiatric/Behavioral: Negative  All other systems reviewed and are negative  Historical Information     Immunizations:   Immunization History   Administered Date(s) Administered    COVID-19 MODERNA VACC 0 5 ML IM 03/11/2021, 04/08/2021       Past Medical History:   Diagnosis Date    Heart problem     Hypertension      History reviewed  No pertinent family history  Past Surgical History:   Procedure Laterality Date    APPENDECTOMY      CAROTID ARTERY ANGIOPLASTY      CARPAL TUNNEL RELEASE       Social History     Tobacco Use    Smoking status: Former Smoker    Smokeless tobacco: Never Used   Vaping Use    Vaping Use: Never used   Substance Use Topics    Alcohol use: Not on file    Drug use: Not on file     E-Cigarette/Vaping    E-Cigarette Use Never User      E-Cigarette/Vaping Substances       Family History: non-contributory    Meds/Allergies   Prior to Admission Medications   Prescriptions Last Dose Informant Patient Reported? Taking? UNKNOWN TO PATIENT   Yes No      Facility-Administered Medications: None       Allergies   Allergen Reactions    Ibuprofen Anaphylaxis       PHYSICAL EXAM      Objective   Vitals:   First set: Temperature: 97 6 °F (36 4 °C) (05/07/22 1620)  Pulse: 65 (05/07/22 1620)  Respirations: 20 (05/07/22 1620)  Blood Pressure: 151/71 (05/07/22 1620)  SpO2: 94 % (05/07/22 1620)    Primary Survey:   (A) Airway:  Normal  (B) Breathing:  Normal  (C) Circulation: Pulses:   normal  (D) Disabliity:  GCS Total:  15  (E) Expose:  Completed    Secondary Survey: (Click on Physical Exam tab above)  Physical Exam  Vitals and nursing note reviewed  Constitutional:       Appearance: Normal appearance  He is well-developed  He is not toxic-appearing  HENT:      Head: Normocephalic and atraumatic  Hair is normal       Jaw: No pain on movement        Right Ear: External ear normal       Left Ear: External ear normal       Nose: Nose normal       Mouth/Throat:      Mouth: Mucous membranes are moist       Pharynx: Oropharynx is clear  Eyes:      General: Lids are normal  No scleral icterus  Extraocular Movements: Extraocular movements intact  Conjunctiva/sclera: Conjunctivae normal       Pupils: Pupils are equal, round, and reactive to light  Cardiovascular:      Rate and Rhythm: Normal rate and regular rhythm  Heart sounds: Normal heart sounds  No murmur heard  Pulmonary:      Effort: Pulmonary effort is normal  No respiratory distress  Breath sounds: Normal breath sounds  No decreased breath sounds, wheezing, rhonchi or rales  Abdominal:      General: Abdomen is flat  There is no distension  Palpations: Abdomen is soft  Abdomen is not rigid  Tenderness: There is no abdominal tenderness  There is no right CVA tenderness, left CVA tenderness or guarding  Musculoskeletal:         General: No signs of injury  Right shoulder: Deformity (Possibly chronic secondary to previous fracture), tenderness and bony tenderness present  No swelling, laceration or crepitus  Decreased range of motion  Normal strength  Left shoulder: No swelling, deformity, tenderness or bony tenderness  Normal range of motion  Cervical back: Normal range of motion and neck supple  Tenderness present  No swelling, deformity or bony tenderness  Normal range of motion  Skin:     General: Skin is warm and dry  Coloration: Skin is not pale  Findings: No rash  Neurological:      General: No focal deficit present  Mental Status: He is alert and oriented to person, place, and time  Mental status is at baseline  Psychiatric:         Attention and Perception: Attention normal          Mood and Affect: Mood normal          Speech: Speech normal          Behavior: Behavior normal          Cervical spine cleared by clinical criteria?  Yes     Invasive Devices  Report    None Lab Results:   Results Reviewed     Procedure Component Value Units Date/Time    Fingerstick Glucose (POCT) [[id number]]  (Normal) Collected: 05/07/22 1623    Lab Status: Final result Updated: 05/07/22 1634     POC Glucose 88 mg/dl                  Imaging Studies:   Direct to CT: Yes    XR Trauma chest portable   ED Interpretation by Shayne Alejandre DO (05/07 1700)   Negative      XR shoulder 2+ views RIGHT   ED Interpretation by Shayne Alejandre DO (05/07 1703)   Chronic changes from previous fracture  No acute injury  TRAUMA - CT head wo contrast   Final Result by Sigrid Torres MD (05/07 1655)      No acute intracranial abnormality  Workstation performed: [id number]         TRAUMA - CT spine cervical wo contrast   Final Result by Sigrid Torres MD (05/07 1651)      No acute cervical spine fracture or traumatic malalignment  Workstation performed: [id number]               Procedures  ECG 12 Lead Documentation Only    Date/Time: 5/7/2022 4:54 PM  Performed by: Shayne Alejandre DO  Authorized by: Shayne Alejandre DO     Indications / Diagnosis:  Trauma  ECG reviewed by me, the ED Provider: yes    Patient location:  ED  Previous ECG:     Previous ECG:  Unavailable    Comparison to cardiac monitor: Yes    Interpretation:     Interpretation: non-specific    Rate:     ECG rate assessment: normal    Rhythm:     Rhythm: sinus rhythm    Ectopy:     Ectopy: none    QRS:     QRS axis:  Normal  Conduction:     Conduction: normal    ST segments:     ST segments:  Non-specific  T waves:     T waves: non-specific               ED Course  ED Course as of 05/07/22 1713   Sat May 07, 2022   1659 CT the head is negative for acute traumatic injury   1659 CT of the cervical spine is negative for acute traumatic injury   1703 Imaging negative for acute process  Patient likely sustained a rotator cuff type injury from the fall    Will place in sling and advised follow-up with orthopedics  1713 Discussed imaging with patient and the need for follow-up  Patient is agreeable to same  Patient was discharged in stable condition  MDM  Number of Diagnoses or Management Options  Rotator cuff dysfunction, right: new and requires workup  Diagnosis management comments: CXR was performed  Pelvic plain film was not performed as the patient had no pelvic tenderness and was ambulatory    Patient deemed stable to proceed to any necessary CT imaging  Cervical spine was cleared clinically and with imaging         Amount and/or Complexity of Data Reviewed  Clinical lab tests: ordered and reviewed  Tests in the radiology section of CPT®: ordered and reviewed  Independent visualization of images, tracings, or specimens: yes    Risk of Complications, Morbidity, and/or Mortality  Presenting problems: high  Diagnostic procedures: moderate  Management options: moderate            Disposition  [facility] Transfer: No  Final diagnoses:   Rotator cuff dysfunction, right     Time reflects when diagnosis was documented in both MDM as applicable and the Disposition within this note     Time User Action Codes Description Comment    5/7/2022  5:05 PM Wade Franz Add [N31 [id number]] Rotator cuff dysfunction, right       ED Disposition     ED Disposition Condition Date/Time Comment    Discharge Stable Sat May 7, 2022  5:05 PM Lila Hughes discharge to home/self care              Follow-up Information     Follow up With Specialties Details Why [facility] Orthopedic Surgery In 1 week Even in well [address]  [phone]          Patient's Medications   Discharge Prescriptions    No medications on file         PDMP Review     None          ED Provider  Electronically Signed by         Kevin Guy DO  05/07/22 [address] [place], [place] [address]DO  05/07/22 [address] [place] [address], DO  05/07/22 [id number]

## 2022-05-08 LAB
ATRIAL RATE: 60 BPM
ATRIAL RATE: 62 BPM
P AXIS: 56 DEGREES
P AXIS: 63 DEGREES
PR INTERVAL: 178 MS
PR INTERVAL: 186 MS
QRS AXIS: 18 DEGREES
QRS AXIS: 20 DEGREES
QRSD INTERVAL: 86 MS
QRSD INTERVAL: 88 MS
QT INTERVAL: 316 MS
QT INTERVAL: 412 MS
QTC INTERVAL: 320 MS
QTC INTERVAL: 412 MS
T WAVE AXIS: 67 DEGREES
T WAVE AXIS: 83 DEGREES
VENTRICULAR RATE: 60 BPM
VENTRICULAR RATE: 62 BPM

## 2022-05-11 ENCOUNTER — OFFICE VISIT (OUTPATIENT)
Dept: OBGYN CLINIC | Facility: CLINIC | Age: 72
End: 2022-05-11
Payer: COMMERCIAL

## 2022-05-11 VITALS
BODY MASS INDEX: 29.55 KG/M2 | DIASTOLIC BLOOD PRESSURE: 90 MMHG | WEIGHT: 206.4 LBS | SYSTOLIC BLOOD PRESSURE: 140 MMHG | HEIGHT: 70 IN | HEART RATE: 62 BPM | TEMPERATURE: 97.2 F

## 2022-05-11 DIAGNOSIS — M25.511 ACUTE PAIN OF RIGHT SHOULDER: Primary | ICD-10-CM

## 2022-05-11 DIAGNOSIS — S40.011A CONTUSION OF RIGHT SHOULDER, INITIAL ENCOUNTER: ICD-10-CM

## 2022-05-11 PROBLEM — M67.911 ROTATOR CUFF DYSFUNCTION, RIGHT: Status: ACTIVE | Noted: 2022-05-11

## 2022-05-11 PROCEDURE — 99204 OFFICE O/P NEW MOD 45 MIN: CPT | Performed by: ORTHOPAEDIC SURGERY

## 2022-05-11 RX ORDER — OMEPRAZOLE 40 MG/1
CAPSULE, DELAYED RELEASE ORAL
COMMUNITY
Start: 2022-04-27

## 2022-05-11 RX ORDER — SUMATRIPTAN 100 MG/1
TABLET, FILM COATED ORAL
COMMUNITY
Start: 2022-04-27

## 2022-05-11 RX ORDER — SIMVASTATIN 20 MG
20 TABLET ORAL
COMMUNITY

## 2022-05-11 RX ORDER — LORATADINE 10 MG/1
TABLET ORAL
COMMUNITY
Start: 2022-04-27

## 2022-05-11 RX ORDER — OLOPATADINE HYDROCHLORIDE 7 MG/ML
SOLUTION OPHTHALMIC
COMMUNITY

## 2022-05-11 RX ORDER — DOXAZOSIN MESYLATE 4 MG/1
TABLET ORAL
COMMUNITY
Start: 2022-03-28

## 2022-05-11 RX ORDER — PROMETHAZINE HYDROCHLORIDE 25 MG/1
TABLET ORAL
COMMUNITY

## 2022-05-11 RX ORDER — NITROGLYCERIN 0.4 MG/1
TABLET SUBLINGUAL
COMMUNITY

## 2022-05-11 RX ORDER — AMLODIPINE BESYLATE 5 MG/1
TABLET ORAL
COMMUNITY
Start: 2022-03-28

## 2022-05-11 RX ORDER — METOPROLOL SUCCINATE 50 MG/1
TABLET, EXTENDED RELEASE ORAL
COMMUNITY
Start: 2022-03-28

## 2022-05-11 RX ORDER — GABAPENTIN 600 MG/1
TABLET ORAL
COMMUNITY
Start: 2022-04-27

## 2022-05-11 RX ORDER — ASPIRIN 81 MG/1
81 TABLET ORAL DAILY
COMMUNITY

## 2022-05-11 RX ORDER — ATORVASTATIN CALCIUM 40 MG/1
TABLET, FILM COATED ORAL
COMMUNITY
Start: 2022-04-27

## 2022-05-11 RX ORDER — B-COMPLEX WITH VITAMIN C
1 TABLET ORAL DAILY
COMMUNITY

## 2022-05-11 NOTE — PROGRESS NOTES
ASSESSMENT/PLAN:    Diagnoses and all orders for this visit:    Acute pain of right shoulder    Contusion of right shoulder, initial encounter  -     Ambulatory referral to Orthopedic Surgery        Plan:  I discussed his treatment options  He did not want to pursue injection and did not feel he needed formal physical therapy as he has seen a significant improvement in symptoms over the past 3 or 4 days  I have encouraged him to contact me if questions or concerns arise or if he changes his mind regarding physical therapy  If he has not seen resolution within 7-10 days, re-evaluation would be warranted  Return if symptoms worsen or fail to improve       _____________________________________________________  CHIEF COMPLAINT:  Chief Complaint   Patient presents with    Right Shoulder - Pain         SUBJECTIVE:  Felicia Yancey is a 80y o  year old right-handed male who presents for evaluation of right shoulder pain  He states he slipped and fell injuring his right shoulder on 05/06/2022  He was seen in the emergency room the following day, x-rays were obtained and he was provided with a sling  He presents now for orthopedic evaluation and treatment  He notes a significant improvement in symptoms since his injury indicating that he was not able to move his shoulder at all when he 1st hurt himself and now can move the shoulder fairly well  He denies any paresthesias  He complains of anterior shoulder pain with some posterior pain  He states that he injured his shoulder in 1988  He states that surgery was recommended but no surgery was ever performed due to some concerns regarding a neck injury  He denies any prolonged problems with his right shoulder  He states pain will radiate across the posterior aspect of his shoulder toward his neck in the posterior aspect of his left shoulder      PAST MEDICAL HISTORY:  Past Medical History:   Diagnosis Date    Heart problem     Hypertension        PAST SURGICAL HISTORY:  Past Surgical History:   Procedure Laterality Date    APPENDECTOMY      CAROTID ARTERY ANGIOPLASTY      CARPAL TUNNEL RELEASE         FAMILY HISTORY:  History reviewed  No pertinent family history  SOCIAL HISTORY:  Social History     Tobacco Use    Smoking status: Former Smoker    Smokeless tobacco: Never Used   Vaping Use    Vaping Use: Never used   Substance Use Topics    Alcohol use: Not Currently    Drug use: Never       MEDICATIONS:    Current Outpatient Medications:     amLODIPine (NORVASC) 5 mg tablet, , Disp: , Rfl:     aspirin (ECOTRIN LOW STRENGTH) 81 mg EC tablet, Take 81 mg by mouth in the morning , Disp: , Rfl:     atorvastatin (LIPITOR) 40 mg tablet, , Disp: , Rfl:     B Complex Vitamins (Vitamin B Complex) TABS, Take 1 tablet by mouth in the morning , Disp: , Rfl:     doxazosin (CARDURA) 4 mg tablet, , Disp: , Rfl:     gabapentin (NEURONTIN) 600 MG tablet, , Disp: , Rfl:     loratadine (CLARITIN) 10 mg tablet, , Disp: , Rfl:     metoprolol succinate (TOPROL-XL) 50 mg 24 hr tablet, , Disp: , Rfl:     Multiple Vitamins-Iron TABS, Take by mouth, Disp: , Rfl:     nitroglycerin (NITROSTAT) 0 4 mg SL tablet, , Disp: , Rfl:     Olopatadine HCl (Pataday) 0 7 % SOLN, Apply to eye, Disp: , Rfl:     omeprazole (PriLOSEC) 40 MG capsule, , Disp: , Rfl:     Potassium 99 MG TABS, Take 99 mg by mouth in the morning , Disp: , Rfl:     simvastatin (ZOCOR) 20 mg tablet, Take 20 mg by mouth, Disp: , Rfl:     SUMAtriptan (IMITREX) 100 mg tablet, , Disp: , Rfl:     UNKNOWN TO PATIENT, , Disp: , Rfl:     ALLERGIES:  Allergies   Allergen Reactions    Ibuprofen Anaphylaxis       Review of systems:   Constitutional: Negative for fatigue, fever or loss of apetite  HENT: Negative  Respiratory: Negative for shortness of breath, dyspnea  Cardiovascular: Negative for chest pain/tightness  Gastrointestinal: Negative for abdominal pain, N/V     Endocrine: Negative for cold/heat intolerance, unexplained weight loss/gain  Genitourinary: Negative for flank pain, dysuria, hematuria  Musculoskeletal:  Positive as in the H   Skin: Negative for rash  Neurological:  Negative  Psychiatric/Behavioral: Negative for agitation  _____________________________________________________  PHYSICAL EXAMINATION:    Blood pressure 140/90, pulse 62, temperature (!) 97 2 °F (36 2 °C), temperature source Temporal, height 5' 10" (1 778 m), weight 93 6 kg (206 lb 6 4 oz)  General: well developed and well nourished, alert, oriented times 3 and appears comfortable  Psychiatric: Normal  HEENT: Benign  Cardiovascular: Regular    Pulmonary: No wheezing or stridor  Abdomen: Soft, Nontender  Skin: No masses, erythema, lacerations, fluctation, ulcerations  Neurovascular: Motor and sensory exams are grossly intact and pulses palpable  MUSCULOSKELETAL EXAMINATION:    The right shoulder exam demonstrates pain through the mid arc of abduction with pain decreasing somewhat as he reaches end range abduction with a maximum abduction of 140° compared to 160° on the contralateral left  He has greater pain while returning to a neutral position than with active abduction  Likewise, he has a similar pattern of pain with forward flexion but greater degree of forward flexion to about 160° comparable to the contralateral left  He complains of pain during Homero's and Paris's tests but demonstrated good strength  He has 60° of external rotation with his arm at his side, comparable to the contralateral left  Abdominal compression and lift-off test were negative  He has tenderness over the acromioclavicular joint, anterior shoulder and lateral shoulder  There is no posterior shoulder tenderness  There is no significant atrophy noted in comparison of the right to the left shoulder  There is some periscapular tenderness noted    The remainder of the right upper extremity exam is benign  _____________________________________________________  STUDIES REVIEWED:  X-rays of her shoulder demonstrate 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

## 2022-06-21 ENCOUNTER — APPOINTMENT (EMERGENCY)
Dept: RADIOLOGY | Facility: HOSPITAL | Age: 72
End: 2022-06-21
Payer: COMMERCIAL

## 2022-06-21 ENCOUNTER — HOSPITAL ENCOUNTER (EMERGENCY)
Facility: HOSPITAL | Age: 72
Discharge: HOME | End: 2022-06-21
Attending: EMERGENCY MEDICINE | Admitting: EMERGENCY MEDICINE
Payer: COMMERCIAL

## 2022-06-21 VITALS
WEIGHT: 203.71 LBS | TEMPERATURE: 97 F | HEIGHT: 70 IN | DIASTOLIC BLOOD PRESSURE: 78 MMHG | OXYGEN SATURATION: 96 % | RESPIRATION RATE: 20 BRPM | SYSTOLIC BLOOD PRESSURE: 148 MMHG | BODY MASS INDEX: 29.16 KG/M2 | HEART RATE: 50 BPM

## 2022-06-21 DIAGNOSIS — J06.9 URI (UPPER RESPIRATORY INFECTION): Primary | ICD-10-CM

## 2022-06-21 PROCEDURE — 99283 EMERGENCY DEPT VISIT LOW MDM: CPT

## 2022-06-21 PROCEDURE — 99284 EMERGENCY DEPT VISIT MOD MDM: CPT | Performed by: EMERGENCY MEDICINE

## 2022-06-21 PROCEDURE — 71046 X-RAY EXAM CHEST 2 VIEWS: CPT

## 2022-06-21 RX ORDER — PREDNISONE 10 MG/1
50 TABLET ORAL DAILY
Qty: 25 TABLET | Refills: 0 | Status: SHIPPED | OUTPATIENT
Start: 2022-06-21 | End: 2022-06-26

## 2022-06-21 RX ORDER — ALBUTEROL SULFATE 90 UG/1
AEROSOL, METERED RESPIRATORY (INHALATION)
Qty: 18 G | Refills: 0 | Status: SHIPPED | OUTPATIENT
Start: 2022-06-21

## 2022-06-21 NOTE — ED PROVIDER NOTES
History  Chief Complaint   Patient presents with    Cough     Pt reports a productive cough for the past 2 weeks, states his friends kids have the same cold like sx, tested covid negative today with a at home test       80year old male complains of cough for the past 2 weeks expectorating green sputum, no blood, chest burning with coughing  Notes few days ago coughed up larger amount of green mucoid sputum  No fever  No dyspnea  Negative home COVID test today  History of pneumonia  History provided by:  Patient  Cough  Cough characteristics:  Productive  Sputum characteristics:  Green  Onset quality:  Gradual  Timing:  Intermittent  Progression:  Unchanged  Chronicity:  New  Context: upper respiratory infection    Relieved by:  Nothing  Worsened by:  Nothing  Ineffective treatments: theraflu, mucinex  Associated symptoms: sinus congestion    Associated symptoms: no fever        Prior to Admission Medications   Prescriptions Last Dose Informant Patient Reported? Taking? B Complex Vitamins (Vitamin B Complex) TABS   Yes No   Sig: Take 1 tablet by mouth in the morning  Multiple Vitamins-Iron TABS   Yes No   Sig: Take by mouth   Olopatadine HCl (Pataday) 0 7 % SOLN   Yes No   Sig: Apply to eye   Potassium 99 MG TABS   Yes No   Sig: Take 99 mg by mouth in the morning  SUMAtriptan (IMITREX) 100 mg tablet   Yes No   UNKNOWN TO PATIENT   Yes No   amLODIPine (NORVASC) 5 mg tablet   Yes No   aspirin (ECOTRIN LOW STRENGTH) 81 mg EC tablet   Yes No   Sig: Take 81 mg by mouth in the morning     atorvastatin (LIPITOR) 40 mg tablet   Yes No   doxazosin (CARDURA) 4 mg tablet   Yes No   gabapentin (NEURONTIN) 600 MG tablet   Yes No   loratadine (CLARITIN) 10 mg tablet   Yes No   metoprolol succinate (TOPROL-XL) 50 mg 24 hr tablet   Yes No   nitroglycerin (NITROSTAT) 0 4 mg SL tablet   Yes No   omeprazole (PriLOSEC) 40 MG capsule   Yes No   simvastatin (ZOCOR) 20 mg tablet   Yes No   Sig: Take 20 mg by mouth Facility-Administered Medications: None       Past Medical History:   Diagnosis Date    Heart problem     Hypertension        Past Surgical History:   Procedure Laterality Date    APPENDECTOMY      CAROTID ARTERY ANGIOPLASTY      CARPAL TUNNEL RELEASE         History reviewed  No pertinent family history  I have reviewed and agree with the history as documented  E-Cigarette/Vaping    E-Cigarette Use Never User      E-Cigarette/Vaping Substances     Social History     Tobacco Use    Smoking status: Former Smoker     Quit date:      Years since quittin 11    Smokeless tobacco: Never Used   Vaping Use    Vaping Use: Never used   Substance Use Topics    Alcohol use: Not Currently    Drug use: Never       Review of Systems   Constitutional: Negative for fever  Respiratory: Positive for cough  All other systems reviewed and are negative  Physical Exam  Physical Exam  Vitals and nursing note reviewed  Constitutional:       Comments: Pleasant, comfortable-appearing   HENT:      Head: Normocephalic and atraumatic  Eyes:      Conjunctiva/sclera: Conjunctivae normal       Pupils: Pupils are equal, round, and reactive to light  Cardiovascular:      Rate and Rhythm: Normal rate and regular rhythm  Heart sounds: Normal heart sounds  Comments: Sternal chest tenderness, no deformity, no overlying skin changes or swelling  Pulmonary:      Effort: Pulmonary effort is normal       Breath sounds: Normal breath sounds  Abdominal:      General: Bowel sounds are normal  There is no distension  Palpations: Abdomen is soft  Tenderness: There is no abdominal tenderness  Musculoskeletal:         General: Normal range of motion  Cervical back: Neck supple  Skin:     General: Skin is warm and dry  Neurological:      Mental Status: He is alert and oriented to person, place, and time  Cranial Nerves: No cranial nerve deficit        Coordination: Coordination normal  Psychiatric:         Behavior: Behavior normal          Thought Content: Thought content normal          Judgment: Judgment normal          Vital Signs  ED Triage Vitals [06/21/22 1202]   Temperature Pulse Respirations Blood Pressure SpO2   (!) 97 °F (36 1 °C) 62 20 163/73 96 %      Temp Source Heart Rate Source Patient Position - Orthostatic VS BP Location FiO2 (%)   Temporal Monitor Sitting Left arm --      Pain Score       --           Vitals:    06/21/22 1202 06/21/22 1230   BP: 163/73 132/72   Pulse: 62 (!) 51   Patient Position - Orthostatic VS: Sitting Lying         Visual Acuity      ED Medications  Medications - No data to display    Diagnostic Studies  Results Reviewed     None                 XR chest 2 views   ED Interpretation by Aleksey Aguiar DO (06/21 1256)   No infiltrate or cardiomegaly                 Procedures  Procedures         ED Course  ED Course as of 06/21/22 1258   Tue Jun 21, 2022   1257 Discussed results and care, remains stable for close outpatient follow-up, voice good understanding to return immediately if worse or any new symptoms                               SBIRT 20yo+    Flowsheet Row Most Recent Value   SBIRT (23 yo +)    In order to provide better care to our patients, we are screening all of our patients for alcohol and drug use  Would it be okay to ask you these screening questions? Yes Filed at: 06/21/2022 1234   Initial Alcohol Screen: US AUDIT-C     1  How often do you have a drink containing alcohol? 0 Filed at: 06/21/2022 1234   2  How many drinks containing alcohol do you have on a typical day you are drinking? 0 Filed at: 06/21/2022 1234   3a  Male UNDER 65: How often do you have five or more drinks on one occasion? 0 Filed at: 06/21/2022 1234   3b  FEMALE Any Age, or MALE 65+: How often do you have 4 or more drinks on one occassion? 0 Filed at: 06/21/2022 1234   Audit-C Score 0 Filed at: 06/21/2022 1234   DUGLAS: How many times in the past year have you        Used an illegal drug or used a prescription medication for non-medical reasons? Never Filed at: 06/21/2022 1234                    MDM    Disposition  Final diagnoses:   URI (upper respiratory infection)     Time reflects when diagnosis was documented in both MDM as applicable and the Disposition within this note     Time User Action Codes Description Comment    6/21/2022 12:56 PM Melody Shields Add [J06 9] URI (upper respiratory infection)       ED Disposition     ED Disposition   Discharge    Condition   Stable    Date/Time   Tue Jun 21, 2022 12:56 PM    Comment   Constance Brito discharge to home/self care                 Follow-up Information     Follow up With Specialties Details Why Contact Linda Marvin MD Family Medicine Schedule an appointment as soon as possible for a visit in 1 week  [address] [phone]            Patient's Medications   Discharge Prescriptions    ALBUTEROL (PROVENTIL HFA,VENTOLIN HFA) 90 MCG/ACT INHALER    2-3 inhalations every 3-4 hours for 3 days and then as needed for cough, wheezing       Start Date: 6/21/2022 End Date: --       Order Dose: --       Quantity: 18 g    Refills: 0    PREDNISONE 10 MG TABLET    Take 5 tablets (50 mg total) by mouth daily for 5 days       Start Date: 6/21/2022 End Date: 6/26/2022       Order Dose: 50 mg       Quantity: 25 tablet    Refills: 0       Outpatient Discharge Orders   Spacer Device for Inhaler       PDMP Review     None          ED Provider  Electronically Signed by           Aleksey Aguiar DO  06/21/22 4951

## 2022-11-26 ENCOUNTER — HOSPITAL ENCOUNTER (EMERGENCY)
Facility: HOSPITAL | Age: 72
Discharge: HOME | End: 2022-11-26
Attending: EMERGENCY MEDICINE

## 2022-11-26 VITALS
SYSTOLIC BLOOD PRESSURE: 143 MMHG | WEIGHT: 208 LBS | OXYGEN SATURATION: 96 % | TEMPERATURE: 97.5 F | HEART RATE: 66 BPM | RESPIRATION RATE: 18 BRPM | DIASTOLIC BLOOD PRESSURE: 79 MMHG | BODY MASS INDEX: 29.78 KG/M2 | HEIGHT: 70 IN

## 2022-11-26 DIAGNOSIS — L03.313 CELLULITIS OF CHEST WALL: Primary | ICD-10-CM

## 2022-11-26 RX ORDER — CEPHALEXIN 500 MG/1
500 CAPSULE ORAL EVERY 6 HOURS SCHEDULED
Qty: 40 CAPSULE | Refills: 0 | Status: SHIPPED | OUTPATIENT
Start: 2022-11-26 | End: 2022-12-06

## 2022-11-26 RX ORDER — SULFAMETHOXAZOLE AND TRIMETHOPRIM 800; 160 MG/1; MG/1
1 TABLET ORAL 2 TIMES DAILY
Qty: 20 TABLET | Refills: 0 | Status: SHIPPED | OUTPATIENT
Start: 2022-11-26 | End: 2022-12-06

## 2022-11-26 RX ORDER — SULFAMETHOXAZOLE AND TRIMETHOPRIM 800; 160 MG/1; MG/1
1 TABLET ORAL ONCE
Status: COMPLETED | OUTPATIENT
Start: 2022-11-26 | End: 2022-11-26

## 2022-11-26 RX ORDER — CEPHALEXIN 250 MG/1
500 CAPSULE ORAL ONCE
Status: COMPLETED | OUTPATIENT
Start: 2022-11-26 | End: 2022-11-26

## 2022-11-26 RX ADMIN — SULFAMETHOXAZOLE AND TRIMETHOPRIM 1 TABLET: 800; 160 TABLET ORAL at 13:44

## 2022-11-26 RX ADMIN — CEPHALEXIN 500 MG: 250 CAPSULE ORAL at 13:44

## 2022-11-26 NOTE — ED PROVIDER NOTES
History  Chief Complaint   Patient presents with   • Rash     Pt c/o rash underneath left armpit to back for past week  Pt also mentioned a lump underneath left armpit for past year that is painful since rash appeared  Denies fevers  • Mass     Patient complains of a small area on the lateral aspect of the left chest that started 1 week ago  Felt like a pimple  Had an area of swelling  Pinch did  Now with increasing redness and swelling  No fevers or chills  No nausea or vomiting or diarrhea  No drainage from the site  Does not itch  No shortness of breath  History provided by:  Patient   used: No    Rash  Location: Left chest   Quality: dryness, painful, redness and swelling    Pain details:     Quality:  Aching    Severity:  Mild    Onset quality:  Gradual    Duration:  1 week    Timing:  Constant    Progression:  Worsening  Severity:  Mild  Onset quality:  Gradual  Duration:  1 week  Timing:  Constant  Progression:  Worsening  Chronicity:  New  Context: not chemical exposure, not new detergent/soap and not pollen    Relieved by:  Nothing  Worsened by:  Nothing  Ineffective treatments:  None tried  Associated symptoms: no abdominal pain, no diarrhea, no fatigue, no fever, no headaches, no nausea, no periorbital edema, no shortness of breath, no sore throat, no tongue swelling, not vomiting and not wheezing        Prior to Admission Medications   Prescriptions Last Dose Informant Patient Reported? Taking? B Complex Vitamins (Vitamin B Complex) TABS   Yes No   Sig: Take 1 tablet by mouth in the morning  Multiple Vitamins-Iron TABS   Yes No   Sig: Take by mouth   Olopatadine HCl (Pataday) 0 7 % SOLN   Yes No   Sig: Apply to eye   Potassium 99 MG TABS   Yes No   Sig: Take 99 mg by mouth in the morning     SUMAtriptan (IMITREX) 100 mg tablet   Yes No   UNKNOWN TO PATIENT   Yes No   albuterol (PROVENTIL HFA,VENTOLIN HFA) 90 mcg/act inhaler   No No   Si-3 inhalations every 3-4 hours for 3 days and then as needed for cough, wheezing   amLODIPine (NORVASC) 5 mg tablet   Yes No   aspirin (ECOTRIN LOW STRENGTH) 81 mg EC tablet   Yes No   Sig: Take 81 mg by mouth in the morning  atorvastatin (LIPITOR) 40 mg tablet   Yes No   doxazosin (CARDURA) 4 mg tablet   Yes No   gabapentin (NEURONTIN) 600 MG tablet   Yes No   loratadine (CLARITIN) 10 mg tablet   Yes No   metoprolol succinate (TOPROL-XL) 50 mg 24 hr tablet   Yes No   nitroglycerin (NITROSTAT) 0 4 mg SL tablet   Yes No   omeprazole (PriLOSEC) 40 MG capsule   Yes No   simvastatin (ZOCOR) 20 mg tablet   Yes No   Sig: Take 20 mg by mouth      Facility-Administered Medications: None       Past Medical History:   Diagnosis Date   • Heart problem    • Hypertension        Past Surgical History:   Procedure Laterality Date   • APPENDECTOMY     • CAROTID ARTERY ANGIOPLASTY     • CARPAL TUNNEL RELEASE         History reviewed  No pertinent family history  I have reviewed and agree with the history as documented  E-Cigarette/Vaping   • E-Cigarette Use Never User      E-Cigarette/Vaping Substances     Social History     Tobacco Use   • Smoking status: Former     Types: Cigarettes     Quit date:      Years since quittin 9   • Smokeless tobacco: Never   Vaping Use   • Vaping Use: Never used   Substance Use Topics   • Alcohol use: Not Currently   • Drug use: Never       Review of Systems   Constitutional: Negative for chills, fatigue and fever  HENT: Negative for ear pain, hearing loss, sore throat, trouble swallowing and voice change  Eyes: Negative for pain and discharge  Respiratory: Negative for cough, shortness of breath and wheezing  Cardiovascular: Negative for chest pain and palpitations  Gastrointestinal: Negative for abdominal pain, blood in stool, constipation, diarrhea, nausea and vomiting  Genitourinary: Negative for dysuria, flank pain, frequency and hematuria     Musculoskeletal: Negative for joint swelling, neck pain and neck stiffness  Skin: Positive for rash  Negative for wound  Neurological: Negative for dizziness, seizures, syncope, facial asymmetry and headaches  Psychiatric/Behavioral: Negative for hallucinations, self-injury and suicidal ideas  All other systems reviewed and are negative  Physical Exam  Physical Exam  Vitals and nursing note reviewed  Constitutional:       General: He is not in acute distress  Appearance: He is well-developed  HENT:      Head: Normocephalic and atraumatic  Right Ear: External ear normal       Left Ear: External ear normal    Eyes:      General: No scleral icterus  Right eye: No discharge  Left eye: No discharge  Extraocular Movements: Extraocular movements intact  Conjunctiva/sclera: Conjunctivae normal    Cardiovascular:      Rate and Rhythm: Normal rate and regular rhythm  Heart sounds: Normal heart sounds  No murmur heard  Pulmonary:      Effort: Pulmonary effort is normal       Breath sounds: Normal breath sounds  No wheezing or rales  Abdominal:      General: Bowel sounds are normal  There is no distension  Palpations: Abdomen is soft  Tenderness: There is no abdominal tenderness  There is no guarding or rebound  Musculoskeletal:         General: No deformity  Normal range of motion  Cervical back: Normal range of motion and neck supple  Skin:     General: Skin is warm and dry  Findings: No rash  Comments: Reddened area to the left lateral chest wall  There is small area of firmness without induration or fluctuance or drainage  The area of redness extends from the nipple area to the infra scapular region on the left side  Neurological:      General: No focal deficit present  Mental Status: He is alert and oriented to person, place, and time  Cranial Nerves: No cranial nerve deficit     Psychiatric:         Mood and Affect: Mood normal          Behavior: Behavior normal  Thought Content: Thought content normal          Judgment: Judgment normal          Vital Signs  ED Triage Vitals [11/26/22 1335]   Temperature Pulse Respirations Blood Pressure SpO2   97 5 °F (36 4 °C) 66 18 143/79 96 %      Temp Source Heart Rate Source Patient Position - Orthostatic VS BP Location FiO2 (%)   Temporal Monitor Sitting Right arm --      Pain Score       No Pain           Vitals:    11/26/22 1335   BP: 143/79   Pulse: 66   Patient Position - Orthostatic VS: Sitting         Visual Acuity      ED Medications  Medications   cephalexin (KEFLEX) capsule 500 mg (500 mg Oral Given 11/26/22 1344)   sulfamethoxazole-trimethoprim (BACTRIM DS) 800-160 mg per tablet 1 tablet (1 tablet Oral Given 11/26/22 1344)       Diagnostic Studies  Results Reviewed     None                 No orders to display              Procedures  Procedures         ED Course  ED Course as of 11/26/22 1347   Sat Nov 26, 2022   1346 Patient without any fevers  Nontoxic appearing  Not tachycardic  Appearance is consistent with cellulitis  Will try antibiotics as an outpatient  Warm compresses  Will return if symptoms worsen  MDM  Number of Diagnoses or Management Options      Disposition  Final diagnoses:   Cellulitis of chest wall     Time reflects when diagnosis was documented in both MDM as applicable and the Disposition within this note     Time User Action Codes Description Comment    11/26/2022  1:43 PM Theodora Higuera Add [T44 038] Cellulitis of chest wall       ED Disposition     ED Disposition   Discharge    Condition   Stable    Date/Time   Sat Nov 26, 2022  1:43 PM    Comment   Lila Hughes discharge to home/self care                 Follow-up Information     Follow up With Specialties Details Why Contact Info    Mechelle Santos MD Family Medicine Call in 2 days  [address]  [address] [phone]            Discharge Medication List as of 11/26/2022 1:44 PM      START taking these medications    Details   cephalexin (KEFLEX) 500 mg capsule Take 1 capsule (500 mg total) by mouth every 6 (six) hours for 10 days, Starting Sat 11/26/2022, Until Tue 12/6/2022, Normal      sulfamethoxazole-trimethoprim (BACTRIM DS) 800-160 mg per tablet Take 1 tablet by mouth 2 (two) times a day for 10 days smx-tmp DS (BACTRIM) 800-160 mg tabs (1tab q12 D10), Starting Sat 11/26/2022, Until Tue 12/6/2022, Normal         CONTINUE these medications which have NOT CHANGED    Details   albuterol (PROVENTIL HFA,VENTOLIN HFA) 90 mcg/act inhaler 2-3 inhalations every 3-4 hours for 3 days and then as needed for cough, wheezing, Normal      amLODIPine (NORVASC) 5 mg tablet Starting Mon 3/28/2022, Historical Med      aspirin (ECOTRIN LOW STRENGTH) 81 mg EC tablet Take 81 mg by mouth in the morning , Historical Med      atorvastatin (LIPITOR) 40 mg tablet Starting Wed 4/27/2022, Historical Med      B Complex Vitamins (Vitamin B Complex) TABS Take 1 tablet by mouth in the morning , Historical Med      doxazosin (CARDURA) 4 mg tablet Starting Mon 3/28/2022, Historical Med      gabapentin (NEURONTIN) 600 MG tablet Starting Wed 4/27/2022, Historical Med      loratadine (CLARITIN) 10 mg tablet Starting Wed 4/27/2022, Historical Med      metoprolol succinate (TOPROL-XL) 50 mg 24 hr tablet Starting Mon 3/28/2022, Historical Med      Multiple Vitamins-Iron TABS Take by mouth, Historical Med      nitroglycerin (NITROSTAT) 0 4 mg SL tablet Historical Med      Olopatadine HCl (Pataday) 0 7 % SOLN Apply to eye, Historical Med      omeprazole (PriLOSEC) 40 MG capsule Starting Wed 4/27/2022, Historical Med      Potassium 99 MG TABS Take 99 mg by mouth in the morning , Historical Med      simvastatin (ZOCOR) 20 mg tablet Take 20 mg by mouth, Historical Med      SUMAtriptan (IMITREX) 100 mg tablet Starting Wed 4/27/2022, Historical Med      UNKNOWN TO PATIENT Historical Med             No discharge procedures on file      PDMP Review     None          ED Provider  Electronically Signed by           Rossy Epperson MD  11/26/22 2198

## 2022-12-31 ENCOUNTER — HOSPITAL ENCOUNTER (EMERGENCY)
Facility: HOSPITAL | Age: 72
Discharge: HOME | End: 2022-12-31
Attending: EMERGENCY MEDICINE | Admitting: EMERGENCY MEDICINE

## 2022-12-31 ENCOUNTER — APPOINTMENT (EMERGENCY)
Dept: RADIOLOGY | Facility: HOSPITAL | Age: 72
End: 2022-12-31

## 2022-12-31 VITALS
OXYGEN SATURATION: 97 % | DIASTOLIC BLOOD PRESSURE: 73 MMHG | HEART RATE: 49 BPM | RESPIRATION RATE: 18 BRPM | SYSTOLIC BLOOD PRESSURE: 159 MMHG | TEMPERATURE: 98.6 F

## 2022-12-31 DIAGNOSIS — R10.84 GENERALIZED ABDOMINAL PAIN: Primary | ICD-10-CM

## 2022-12-31 DIAGNOSIS — R07.9 CHEST PAIN: ICD-10-CM

## 2022-12-31 LAB
ALBUMIN SERPL BCP-MCNC: 4.3 G/DL (ref 3.5–5)
ALP SERPL-CCNC: 102 U/L (ref 46–116)
ALT SERPL W P-5'-P-CCNC: 25 U/L (ref 12–78)
ANION GAP SERPL CALCULATED.3IONS-SCNC: 9 MMOL/L (ref 4–13)
AST SERPL W P-5'-P-CCNC: 30 U/L (ref 5–45)
BASOPHILS # BLD AUTO: 0.04 THOUSANDS/ÂΜL (ref 0–0.1)
BASOPHILS NFR BLD AUTO: 1 % (ref 0–1)
BILIRUB SERPL-MCNC: 0.29 MG/DL (ref 0.2–1)
BUN SERPL-MCNC: 20 MG/DL (ref 5–25)
CALCIUM SERPL-MCNC: 9.2 MG/DL (ref 8.3–10.1)
CARDIAC TROPONIN I PNL SERPL HS: 3 NG/L
CHLORIDE SERPL-SCNC: 104 MMOL/L (ref 96–108)
CK SERPL-CCNC: 138 U/L (ref 39–308)
CO2 SERPL-SCNC: 26 MMOL/L (ref 21–32)
CREAT SERPL-MCNC: 1.22 MG/DL (ref 0.6–1.3)
CRP SERPL QL: 1.3 MG/L
D DIMER PPP FEU-MCNC: 0.29 UG/ML FEU
EOSINOPHIL # BLD AUTO: 0.27 THOUSAND/ÂΜL (ref 0–0.61)
EOSINOPHIL NFR BLD AUTO: 4 % (ref 0–6)
ERYTHROCYTE [DISTWIDTH] IN BLOOD BY AUTOMATED COUNT: 13.2 % (ref 11.6–15.1)
FLUAV RNA RESP QL NAA+PROBE: NEGATIVE
FLUBV RNA RESP QL NAA+PROBE: NEGATIVE
GFR SERPL CREATININE-BSD FRML MDRD: 59 ML/MIN/1.73SQ M
GLUCOSE SERPL-MCNC: 127 MG/DL (ref 65–140)
HCT VFR BLD AUTO: 40.9 % (ref 36.5–49.3)
HGB BLD-MCNC: 13.5 G/DL (ref 12–17)
IMM GRANULOCYTES # BLD AUTO: 0.03 THOUSAND/UL (ref 0–0.2)
IMM GRANULOCYTES NFR BLD AUTO: 0 % (ref 0–2)
INR PPP: 0.95 (ref 0.84–1.19)
LACTATE SERPL-SCNC: 1.9 MMOL/L (ref 0.5–2)
LYMPHOCYTES # BLD AUTO: 1.35 THOUSANDS/ÂΜL (ref 0.6–4.47)
LYMPHOCYTES NFR BLD AUTO: 18 % (ref 14–44)
MAGNESIUM SERPL-MCNC: 2.1 MG/DL (ref 1.6–2.6)
MCH RBC QN AUTO: 30.5 PG (ref 26.8–34.3)
MCHC RBC AUTO-ENTMCNC: 33 G/DL (ref 31.4–37.4)
MCV RBC AUTO: 92 FL (ref 82–98)
MONOCYTES # BLD AUTO: 0.82 THOUSAND/ÂΜL (ref 0.17–1.22)
MONOCYTES NFR BLD AUTO: 11 % (ref 4–12)
NEUTROPHILS # BLD AUTO: 5 THOUSANDS/ÂΜL (ref 1.85–7.62)
NEUTS SEG NFR BLD AUTO: 66 % (ref 43–75)
NRBC BLD AUTO-RTO: 0 /100 WBCS
NT-PROBNP SERPL-MCNC: 73 PG/ML
PLATELET # BLD AUTO: 214 THOUSANDS/UL (ref 149–390)
PMV BLD AUTO: 9.1 FL (ref 8.9–12.7)
POTASSIUM SERPL-SCNC: 3.7 MMOL/L (ref 3.5–5.3)
PROCALCITONIN SERPL-MCNC: <0.05 NG/ML
PROT SERPL-MCNC: 7.4 G/DL (ref 6.4–8.4)
PROTHROMBIN TIME: 12.8 SECONDS (ref 11.6–14.5)
RBC # BLD AUTO: 4.43 MILLION/UL (ref 3.88–5.62)
RSV RNA RESP QL NAA+PROBE: NEGATIVE
SARS-COV-2 RNA RESP QL NAA+PROBE: NEGATIVE
SODIUM SERPL-SCNC: 139 MMOL/L (ref 135–147)
WBC # BLD AUTO: 7.51 THOUSAND/UL (ref 4.31–10.16)

## 2022-12-31 RX ORDER — ONDANSETRON 4 MG/1
4 TABLET, ORALLY DISINTEGRATING ORAL EVERY 6 HOURS PRN
Qty: 20 TABLET | Refills: 0 | Status: SHIPPED | OUTPATIENT
Start: 2022-12-31

## 2022-12-31 RX ORDER — DICYCLOMINE HCL 20 MG
20 TABLET ORAL 2 TIMES DAILY
Qty: 20 TABLET | Refills: 0 | Status: SHIPPED | OUTPATIENT
Start: 2022-12-31

## 2022-12-31 RX ORDER — FAMOTIDINE 10 MG/ML
20 INJECTION, SOLUTION INTRAVENOUS ONCE
Status: COMPLETED | OUTPATIENT
Start: 2022-12-31 | End: 2022-12-31

## 2022-12-31 RX ORDER — ONDANSETRON 2 MG/ML
4 INJECTION INTRAMUSCULAR; INTRAVENOUS ONCE
Status: COMPLETED | OUTPATIENT
Start: 2022-12-31 | End: 2022-12-31

## 2022-12-31 RX ADMIN — FAMOTIDINE 20 MG: 10 INJECTION, SOLUTION INTRAVENOUS at 10:35

## 2022-12-31 RX ADMIN — ONDANSETRON 4 MG: 2 INJECTION INTRAMUSCULAR; INTRAVENOUS at 10:36

## 2022-12-31 RX ADMIN — SODIUM CHLORIDE 1000 ML: 0.9 INJECTION, SOLUTION INTRAVENOUS at 10:33

## 2022-12-31 NOTE — ED PROVIDER NOTES
History  Chief Complaint   Patient presents with   • Abdominal Pain     Pt states chest pain for months, generalized abdominal pain for the past week  States diarrhea "the other day"  Pt states today felt hot, +productive cough  Is a 71-year-old male presenting to the emergency department complaining of epigastric abdominal pain associated with nausea this been going on for the past week, he had some diarrhea a few days ago but that has since resolved, he denies any blood in his stools, no blood in his urine, no dysuria, denies any fevers or chills, he does have a cough that is productive of clear or whitish phlegm, he is also been having some intermittent midsternal chest pain for the past few months, denies any aggravating or alleviating symptoms, no pain with taking deep breaths but there is pain over the mid sternum with palpation, denies any sick contacts, states he took 2 omeprazole this morning and this did not relieve his symptoms prompting him to come to the emergency department for evaluation          Prior to Admission Medications   Prescriptions Last Dose Informant Patient Reported? Taking? B Complex Vitamins (Vitamin B Complex) TABS   Yes No   Sig: Take 1 tablet by mouth in the morning  Multiple Vitamins-Iron TABS   Yes No   Sig: Take by mouth   Olopatadine HCl (Pataday) 0 7 % SOLN   Yes No   Sig: Apply to eye   Potassium 99 MG TABS   Yes No   Sig: Take 99 mg by mouth in the morning  SUMAtriptan (IMITREX) 100 mg tablet   Yes No   UNKNOWN TO PATIENT   Yes No   albuterol (PROVENTIL HFA,VENTOLIN HFA) 90 mcg/act inhaler   No No   Si-3 inhalations every 3-4 hours for 3 days and then as needed for cough, wheezing   amLODIPine (NORVASC) 5 mg tablet   Yes No   aspirin (ECOTRIN LOW STRENGTH) 81 mg EC tablet   Yes No   Sig: Take 81 mg by mouth in the morning     atorvastatin (LIPITOR) 40 mg tablet   Yes No   doxazosin (CARDURA) 4 mg tablet   Yes No   gabapentin (NEURONTIN) 600 MG tablet   Yes No loratadine (CLARITIN) 10 mg tablet   Yes No   metoprolol succinate (TOPROL-XL) 50 mg 24 hr tablet   Yes No   nitroglycerin (NITROSTAT) 0 4 mg SL tablet   Yes No   omeprazole (PriLOSEC) 40 MG capsule   Yes No   simvastatin (ZOCOR) 20 mg tablet   Yes No   Sig: Take 20 mg by mouth      Facility-Administered Medications: None       Past Medical History:   Diagnosis Date   • Heart problem    • Hypertension        Past Surgical History:   Procedure Laterality Date   • APPENDECTOMY     • CAROTID ARTERY ANGIOPLASTY     • CARPAL TUNNEL RELEASE         History reviewed  No pertinent family history  I have reviewed and agree with the history as documented  E-Cigarette/Vaping   • E-Cigarette Use Never User      E-Cigarette/Vaping Substances     Social History     Tobacco Use   • Smoking status: Former     Types: Cigarettes     Quit date:      Years since quittin 0   • Smokeless tobacco: Never   Vaping Use   • Vaping Use: Never used   Substance Use Topics   • Alcohol use: Not Currently   • Drug use: Never       Review of Systems   Constitutional: Negative  HENT: Negative  Eyes: Negative  Respiratory: Positive for cough and chest tightness  Cardiovascular: Positive for chest pain  Gastrointestinal: Positive for abdominal pain, diarrhea and nausea  Endocrine: Negative  Genitourinary: Negative  Musculoskeletal: Negative  Skin: Negative  Allergic/Immunologic: Negative  Neurological: Negative  Hematological: Negative  Psychiatric/Behavioral: Negative  Physical Exam  Physical Exam  Constitutional:       Appearance: He is well-developed  HENT:      Head: Normocephalic and atraumatic  Eyes:      Conjunctiva/sclera: Conjunctivae normal       Pupils: Pupils are equal, round, and reactive to light  Cardiovascular:      Rate and Rhythm: Regular rhythm  Bradycardia present     Pulmonary:      Effort: Pulmonary effort is normal    Chest:          Comments: Tenderness to palpate over the mid sternum, no bony deformity or crepitus  Abdominal:      General: Bowel sounds are normal       Palpations: Abdomen is soft  Tenderness: There is abdominal tenderness in the epigastric area  Musculoskeletal:         General: Normal range of motion  Cervical back: Normal range of motion and neck supple  Skin:     General: Skin is warm and dry  Neurological:      Mental Status: He is alert and oriented to person, place, and time  Vital Signs  ED Triage Vitals   Temperature Pulse Respirations Blood Pressure SpO2   12/31/22 1040 12/31/22 1024 12/31/22 1024 12/31/22 1024 12/31/22 1024   98 6 °F (37 °C) (!) 48 20 139/66 96 %      Temp Source Heart Rate Source Patient Position - Orthostatic VS BP Location FiO2 (%)   12/31/22 1040 12/31/22 1024 12/31/22 1024 12/31/22 1024 --   Temporal Monitor Lying Left arm       Pain Score       12/31/22 1027       8           Vitals:    12/31/22 1024   BP: 139/66   Pulse: (!) 48   Patient Position - Orthostatic VS: Lying         Visual Acuity      ED Medications  Medications   ondansetron (ZOFRAN) injection 4 mg (4 mg Intravenous Given 12/31/22 1036)   sodium chloride 0 9 % bolus 1,000 mL (0 mL Intravenous Stopped 12/31/22 1127)   Famotidine (PF) (PEPCID) injection 20 mg (20 mg Intravenous Given 12/31/22 1035)       Diagnostic Studies  Results Reviewed     Procedure Component Value Units Date/Time    FLU/RSV/COVID - if FLU/RSV clinically relevant [[id number]]  (Normal) Collected: 12/31/22 1031    Lab Status: Final result Specimen: Nares from Nasopharyngeal Swab Updated: 12/31/22 1123     SARS-CoV-2 Negative     INFLUENZA A PCR Negative     INFLUENZA B PCR Negative     RSV PCR Negative    Narrative:      FOR PEDIATRIC PATIENTS - copy/paste COVID Guidelines URL to browser: [email or web address]  [email or web address]    SARS-CoV-2 assay is a Nucleic Acid Amplification assay intended for the  qualitative detection of nucleic acid from SARS-CoV-2 in nasopharyngeal  swabs  Results are for the presumptive identification of SARS-CoV-2 RNA  Positive results are indicative of infection with SARS-CoV-2, the virus  causing COVID-19, but do not rule out bacterial infection or co-infection  with other viruses  Laboratories within the [place] and its  territories are required to report all positive results to the appropriate  public health authorities  Negative results do not preclude SARS-CoV-2  infection and should not be used as the sole basis for treatment or other  patient management decisions  Negative results must be combined with  clinical observations, patient history, and epidemiological information  This test has not been FDA cleared or approved  This test has been authorized by FDA under an Emergency Use Authorization  (EUA)  This test is only authorized for the duration of time the  declaration that circumstances exist justifying the authorization of the  emergency use of an in vitro diagnostic tests for detection of SARS-CoV-2  virus and/or diagnosis of COVID-19 infection under section 564(b)(1) of  the Act, 21 U  S C  205WEP-4(R)(8), unless the authorization is terminated  or revoked sooner  The test has been validated but independent review by FDA  and CLIA is pending  Test performed using [employer] GeneXpert: This RT-PCR assay targets N2,  a region unique to SARS-CoV-2  A conserved region in the E-gene was chosen  for pan-Sarbecovirus detection which includes SARS-CoV-2  According to CMS-2020-01-R, this platform meets the definition of high-throughput technology      NT-BNP PRO [[id number]]  (Normal) Collected: 12/31/22 1031    Lab Status: Final result Specimen: Blood from Arm, Left Updated: 12/31/22 1119     NT-proBNP 73 pg/mL     Magnesium [[id number]]  (Normal) Collected: 12/31/22 1031    Lab Status: Final result Specimen: Blood from Arm, Left Updated: 12/31/22 1118     Magnesium 2 1 mg/dL     C-reactive protein [[id number]]  (Normal) Collected: 12/31/22 1031    Lab Status: Final result Specimen: Blood from Arm, Left Updated: 12/31/22 1118     CRP 1 3 mg/L     CK (with reflex to MB) [[id number]]  (Normal) Collected: 12/31/22 1031    Lab Status: Final result Specimen: Blood from Arm, Left Updated: 12/31/22 1117     Total  U/L     Comprehensive metabolic panel [[id number]] Collected: 12/31/22 1031    Lab Status: Final result Specimen: Blood from Arm, Left Updated: 12/31/22 1112     Sodium 139 mmol/L      Potassium 3 7 mmol/L      Chloride 104 mmol/L      CO2 26 mmol/L      ANION GAP 9 mmol/L      BUN 20 mg/dL      Creatinine 1 22 mg/dL      Glucose 127 mg/dL      Calcium 9 2 mg/dL      AST 30 U/L      ALT 25 U/L      Alkaline Phosphatase 102 U/L      Total Protein 7 4 g/dL      Albumin 4 3 g/dL      Total Bilirubin 0 29 mg/dL      eGFR 59 ml/min/1 73sq m     Narrative:      [place] guidelines for Chronic Kidney Disease (CKD):   •  Stage 1 with normal or high GFR (GFR > 90 mL/min/1 73 square meters)  •  Stage 2 Mild CKD (GFR = 60-89 mL/min/1 73 square meters)  •  Stage 3A Moderate CKD (GFR = 45-59 mL/min/1 73 square meters)  •  Stage 3B Moderate CKD (GFR = 30-44 mL/min/1 73 square meters)  •  Stage 4 Severe CKD (GFR = 15-29 mL/min/1 73 square meters)  •  Stage 5 End Stage CKD (GFR <15 mL/min/1 73 square meters)  Note: GFR calculation is accurate only with a steady state creatinine    Lactic acid, plasma [[id number]]  (Normal) Collected: 12/31/22 1031    Lab Status: Final result Specimen: Blood from Arm, Left Updated: 12/31/22 1112     LACTIC ACID 1 9 mmol/L     Narrative:      Result may be elevated if tourniquet was used during collection      HS Troponin 0hr (reflex protocol) [[id number]]  (Normal) Collected: 12/31/22 1031    Lab Status: Final result Specimen: Blood from Arm, Left Updated: 12/31/22 1111     hs TnI 0hr 3 ng/L     D-dimer, quantitative [[id number]]  (Normal) Collected: 12/31/22 1039 Lab Status: Final result Specimen: Blood from Arm, Left Updated: 12/31/22 1103     D-Dimer, Quant 0 29 ug/ml FEU     Narrative: In the evaluation for possible pulmonary embolism, in the appropriate (Well's Score of 4 or less) patient, the age adjusted d-dimer cutoff for this patient can be calculated as:    Age x 0 01 (in ug/mL) for Age-adjusted D-dimer exclusion threshold for a patient over 50 years  Protime-INR [[id number]]  (Normal) Collected: 12/31/22 1031    Lab Status: Final result Specimen: Blood from Arm, Left Updated: 12/31/22 1102     Protime 12 8 seconds      INR 0 95    Procalcitonin [[id number]] Collected: 12/31/22 1031    Lab Status: In process Specimen: Blood from Arm, Left Updated: 12/31/22 1052    CBC and differential [[id number]] Collected: 12/31/22 1031    Lab Status: Final result Specimen: Blood from Arm, Left Updated: 12/31/22 1043     WBC 7 51 Thousand/uL      RBC 4 43 Million/uL      Hemoglobin 13 5 g/dL      Hematocrit 40 9 %      MCV 92 fL      MCH 30 5 pg      MCHC 33 0 g/dL      RDW 13 2 %      MPV 9 1 fL      Platelets 282 Thousands/uL      nRBC 0 /100 WBCs      Neutrophils Relative 66 %      Immat GRANS % 0 %      Lymphocytes Relative 18 %      Monocytes Relative 11 %      Eosinophils Relative 4 %      Basophils Relative 1 %      Neutrophils Absolute 5 00 Thousands/µL      Immature Grans Absolute 0 03 Thousand/uL      Lymphocytes Absolute 1 35 Thousands/µL      Monocytes Absolute 0 82 Thousand/µL      Eosinophils Absolute 0 27 Thousand/µL      Basophils Absolute 0 04 Thousands/µL     Blood culture #2 [[id number]] Collected: 12/31/22 1031    Lab Status:  In process Specimen: Blood from Arm, Left Updated: 12/31/22 1040    Blood culture #1 [[id number]]     Lab Status: No result Specimen: Blood                  XR chest 1 view portable   ED Interpretation by Federico Salomon DO (12/31 1050)   No acute findings                 Procedures  ECG 12 Lead Documentation Only    Date/Time: 12/31/2022 10:32 AM  Performed by: Ravin Choudhury DO  Authorized by: Ravin Choudhury DO     Indications / Diagnosis:  Chest pain  ECG reviewed by me, the ED Provider: yes    Previous ECG:     Comparison to cardiac monitor: Yes    Interpretation:     Interpretation: abnormal    Rate:     ECG rate:  43    ECG rate assessment: bradycardic    Rhythm:     Rhythm: sinus bradycardia    Ectopy:     Ectopy: none    QRS:     QRS intervals:  Normal  Conduction:     Conduction: normal    ST segments:     ST segments:  Normal  T waves:     T waves: inverted      Inverted:  V4 and V5             ED Course             HEART Risk Score    Flowsheet Row Most Recent Value   Heart Score Risk Calculator    History 0 Filed at: 12/31/2022 1137   ECG 1 Filed at: 12/31/2022 1137   Age 2 Filed at: 12/31/2022 1137   Risk Factors 1 Filed at: 12/31/2022 1137   Troponin 0 Filed at: 12/31/2022 1137   HEART Score 4 Filed at: 12/31/2022 1137                        SBIRT 22yo+    Flowsheet Row Most Recent Value   SBIRT (23 yo +)    In order to provide better care to our patients, we are screening all of our patients for alcohol and drug use  Would it be okay to ask you these screening questions?  No Filed at: 12/31/2022 1027                    MDM  Number of Diagnoses or Management Options  Chest pain: established and worsening  Generalized abdominal pain: established and worsening  Diagnosis management comments: Patient with symptoms that been going on a week or more, unremarkable evaluation here with stable vital signs, noted to have bradycardia, however takes metoprolol on a daily basis, appears in no acute distress, therefore discharged with prescriptions for symptom control and advised close follow-up with PCP or return if symptoms worsen, patient acknowledges understanding and agreement with this plan       Amount and/or Complexity of Data Reviewed  Clinical lab tests: ordered and reviewed  Tests in the radiology section of CPT®: ordered and reviewed  Tests in the medicine section of CPT®: ordered and reviewed  Review and summarize past medical records: yes  Independent visualization of images, tracings, or specimens: yes    Risk of Complications, Morbidity, and/or Mortality  Presenting problems: moderate  Diagnostic procedures: moderate  Management options: moderate        Disposition  Final diagnoses:   Generalized abdominal pain   Chest pain     Time reflects when diagnosis was documented in both MDM as applicable and the Disposition within this note     Time User Action Codes Description Comment    12/31/2022 11:41 AM Tiffanie Navarro Add [R10 84] Generalized abdominal pain     12/31/2022 11:41 AM Terri Vaughn Add [R07 9] Chest pain       ED Disposition     ED Disposition   Discharge    Condition   Stable    Date/Time   Sat Dec 31, 2022 11:41 AM    Comment   Brett Le discharge to home/self care  Follow-up Information     Follow up With Specialties Details Why Sierra Em MD Family Medicine In 2 days As [place] [facility] [place]  [phone]            Patient's Medications   Discharge Prescriptions    DICYCLOMINE (BENTYL) 20 MG TABLET    Take 1 tablet (20 mg total) by mouth 2 (two) times a day       Start Date: 12/31/2022End Date: --       Order Dose: 20 mg       Quantity: 20 tablet    Refills: 0    ONDANSETRON (ZOFRAN ODT) 4 MG DISINTEGRATING TABLET    Take 1 tablet (4 mg total) by mouth every 6 (six) hours as needed for nausea or vomiting       Start Date: 12/31/2022End Date: --       Order Dose: 4 mg       Quantity: 20 tablet    Refills: 0       No discharge procedures on file      PDMP Review     None          ED Provider  Electronically Signed by           Chinyere Rodas DO  12/31/22 1140

## 2022-12-31 NOTE — ED NOTES
Pt in no acute distress  Ambulates with a steady gait   Verbalizes understanding of discharge instructions       Maya Gallegos RN  12/31/22 [id number]

## 2023-01-01 LAB
ATRIAL RATE: 43 BPM
P AXIS: 69 DEGREES
PR INTERVAL: 190 MS
QRS AXIS: 19 DEGREES
QRSD INTERVAL: 86 MS
QT INTERVAL: 468 MS
QTC INTERVAL: 395 MS
T WAVE AXIS: 73 DEGREES
VENTRICULAR RATE: 43 BPM

## 2023-01-05 LAB — BACTERIA BLD CULT: NORMAL

## 2023-04-26 ENCOUNTER — HOSPITAL ENCOUNTER (OUTPATIENT)
Dept: ULTRASOUND IMAGING | Facility: HOSPITAL | Age: 73
Discharge: HOME | End: 2023-04-26

## 2023-04-26 DIAGNOSIS — R22.2 LOCALIZED SWELLING OF CHEST WALL: ICD-10-CM

## 2023-04-28 DIAGNOSIS — R22.2 LOCALIZED SWELLING, MASS AND LUMP, TRUNK: ICD-10-CM

## 2023-05-03 ENCOUNTER — APPOINTMENT (OUTPATIENT)
Dept: LAB | Facility: HOSPITAL | Age: 73
End: 2023-05-03

## 2023-05-03 ENCOUNTER — OFFICE VISIT (OUTPATIENT)
Dept: LAB | Facility: HOSPITAL | Age: 73
End: 2023-05-03

## 2023-05-03 DIAGNOSIS — Z01.818 PRE-OP TESTING: ICD-10-CM

## 2023-05-03 LAB
ATRIAL RATE: 49 BPM
ERYTHROCYTE [DISTWIDTH] IN BLOOD BY AUTOMATED COUNT: 13.1 % (ref 11.6–15.1)
HCT VFR BLD AUTO: 40.1 % (ref 36.5–49.3)
HGB BLD-MCNC: 13.5 G/DL (ref 12–17)
MCH RBC QN AUTO: 31 PG (ref 26.8–34.3)
MCHC RBC AUTO-ENTMCNC: 33.7 G/DL (ref 31.4–37.4)
MCV RBC AUTO: 92 FL (ref 82–98)
P AXIS: 55 DEGREES
PLATELET # BLD AUTO: 217 THOUSANDS/UL (ref 149–390)
PMV BLD AUTO: 9 FL (ref 8.9–12.7)
PR INTERVAL: 188 MS
QRS AXIS: 6 DEGREES
QRSD INTERVAL: 84 MS
QT INTERVAL: 434 MS
QTC INTERVAL: 392 MS
RBC # BLD AUTO: 4.35 MILLION/UL (ref 3.88–5.62)
T WAVE AXIS: 37 DEGREES
VENTRICULAR RATE: 49 BPM
WBC # BLD AUTO: 6.52 THOUSAND/UL (ref 4.31–10.16)

## 2023-05-15 ENCOUNTER — HOSPITAL ENCOUNTER (OUTPATIENT)
Dept: MRI IMAGING | Facility: HOSPITAL | Age: 73
Discharge: HOME | End: 2023-05-15

## 2023-05-15 DIAGNOSIS — R22.2 LOCALIZED SWELLING, MASS AND LUMP, TRUNK: ICD-10-CM

## 2023-05-16 RX ORDER — RIBOFLAVIN (VITAMIN B2) 100 MG
100 TABLET ORAL DAILY
COMMUNITY

## 2023-05-16 NOTE — PRE-PROCEDURE INSTRUCTIONS
Pre-Surgery Instructions:   Medication Instructions   • albuterol (PROVENTIL HFA,VENTOLIN HFA) 90 mcg/act inhaler Uses PRN- OK to take day of surgery   • amLODIPine (NORVASC) 5 mg tablet Take day of surgery  • Ascorbic Acid (vitamin C) 100 MG tablet Stop taking 7 days prior to surgery  • aspirin 325 mg tablet Stop taking 7 days prior to surgery  • atorvastatin (LIPITOR) 40 mg tablet Take night before surgery   • B Complex Vitamins (Vitamin B Complex) TABS Stop taking 7 days prior to surgery  • doxazosin (CARDURA) 4 mg tablet Take night before surgery   • gabapentin (NEURONTIN) 600 MG tablet Take day of surgery  • loratadine (CLARITIN) 10 mg tablet Hold day of surgery  • metoprolol succinate (TOPROL-XL) 50 mg 24 hr tablet Take day of surgery  • Multiple Vitamins-Iron TABS Stop taking 7 days prior to surgery  • Olopatadine HCl (Pataday) 0 7 % SOLN Take day of surgery  • omeprazole (PriLOSEC) 40 MG capsule Hold day of surgery  • Potassium 99 MG TABS Stop taking 7 days prior to surgery  Medication instructions for day surgery reviewed  Please use only a sip of water to take your instructed medications  Avoid all over the counter vitamins, supplements and NSAIDS for one week prior to surgery per anesthesia guidelines  Tylenol is ok to take as needed  You will receive a call one business day prior to surgery with an arrival time and hospital directions  If your surgery is scheduled on a Monday, the hospital will be calling you on the Friday prior to your surgery  If you have not heard from anyone by 8pm, please call the hospital supervisor through the hospital  at [phone]  Elia Vogel [phone])  Do not eat or drink anything after midnight the night before your surgery, including candy, mints, lifesavers, or chewing gum  Do not drink alcohol 24hrs before your surgery  Try not to smoke at least 24hrs before your surgery         Follow the pre surgery showering instructions as listed in the [facility] Surgical Experience Booklet” or otherwise provided by your surgeon's office  Do not shave the surgical area 24 hours before surgery  Do not apply any lotions, creams, including makeup, cologne, deodorant, or perfumes after showering on the day of your surgery  No contact lenses, eye make-up, or artificial eyelashes  Remove nail polish, including gel polish, and any artificial, gel, or acrylic nails if possible  Remove all jewelry including rings and body piercing jewelry  Wear causal clothing that is easy to take on and off  Consider your type of surgery  Keep any valuables, jewelry, piercings at home  Please bring any specially ordered equipment (sling, braces) if indicated  Arrange for a responsible person to drive you to and from the hospital on the day of your surgery  Visitor Guidelines discussed  Call the surgeon's office with any new illnesses, exposures, or additional questions prior to surgery  Please reference your [facility] Surgical Experience Booklet” for additional information to prepare for your upcoming surgery

## 2023-05-23 ENCOUNTER — HOSPITAL ENCOUNTER (OUTPATIENT)
Facility: HOSPITAL | Age: 73
Setting detail: OUTPATIENT SURGERY
Discharge: HOME | End: 2023-05-23
Attending: STUDENT IN AN ORGANIZED HEALTH CARE EDUCATION/TRAINING PROGRAM | Admitting: STUDENT IN AN ORGANIZED HEALTH CARE EDUCATION/TRAINING PROGRAM

## 2023-05-23 ENCOUNTER — ANESTHESIA (OUTPATIENT)
Dept: PERIOP | Facility: HOSPITAL | Age: 73
End: 2023-05-23

## 2023-05-23 ENCOUNTER — ANESTHESIA EVENT (OUTPATIENT)
Dept: PERIOP | Facility: HOSPITAL | Age: 73
End: 2023-05-23

## 2023-05-23 VITALS
BODY MASS INDEX: 28.63 KG/M2 | HEIGHT: 70 IN | DIASTOLIC BLOOD PRESSURE: 77 MMHG | RESPIRATION RATE: 20 BRPM | HEART RATE: 55 BPM | SYSTOLIC BLOOD PRESSURE: 139 MMHG | OXYGEN SATURATION: 98 % | TEMPERATURE: 98.2 F | WEIGHT: 200 LBS

## 2023-05-23 DIAGNOSIS — K40.20 NON-RECURRENT BILATERAL INGUINAL HERNIA WITHOUT OBSTRUCTION OR GANGRENE: Primary | ICD-10-CM

## 2023-05-23 DEVICE — 3DMAX™ MID ANATOMICAL MESH, LARGE, RIGHT, 4” X 6”, 10 X 16 CM
Type: IMPLANTABLE DEVICE | Status: FUNCTIONAL
Brand: 3DMAX™ MID ANATOMICAL MESH

## 2023-05-23 RX ORDER — ONDANSETRON 2 MG/ML
INJECTION INTRAMUSCULAR; INTRAVENOUS
Status: COMPLETED
Start: 2023-05-23 | End: 2023-05-23

## 2023-05-23 RX ORDER — DEXMEDETOMIDINE HYDROCHLORIDE 100 UG/ML
INJECTION, SOLUTION INTRAVENOUS AS NEEDED
Status: DISCONTINUED | OUTPATIENT
Start: 2023-05-23 | End: 2023-05-23

## 2023-05-23 RX ORDER — GLYCOPYRROLATE 0.2 MG/ML
INJECTION INTRAMUSCULAR; INTRAVENOUS AS NEEDED
Status: DISCONTINUED | OUTPATIENT
Start: 2023-05-23 | End: 2023-05-23

## 2023-05-23 RX ORDER — FENTANYL CITRATE/PF 50 MCG/ML
25 SYRINGE (ML) INJECTION
Status: DISCONTINUED | OUTPATIENT
Start: 2023-05-23 | End: 2023-05-23 | Stop reason: HOSPADM

## 2023-05-23 RX ORDER — LIDOCAINE HYDROCHLORIDE AND EPINEPHRINE 10; 10 MG/ML; UG/ML
INJECTION, SOLUTION INFILTRATION; PERINEURAL AS NEEDED
Status: DISCONTINUED | OUTPATIENT
Start: 2023-05-23 | End: 2023-05-23 | Stop reason: HOSPADM

## 2023-05-23 RX ORDER — CEFAZOLIN SODIUM 2 G/50ML
2000 SOLUTION INTRAVENOUS ONCE
Status: COMPLETED | OUTPATIENT
Start: 2023-05-23 | End: 2023-05-23

## 2023-05-23 RX ORDER — LIDOCAINE HYDROCHLORIDE 10 MG/ML
INJECTION, SOLUTION EPIDURAL; INFILTRATION; INTRACAUDAL; PERINEURAL AS NEEDED
Status: DISCONTINUED | OUTPATIENT
Start: 2023-05-23 | End: 2023-05-23

## 2023-05-23 RX ORDER — ROCURONIUM BROMIDE 10 MG/ML
INJECTION, SOLUTION INTRAVENOUS AS NEEDED
Status: DISCONTINUED | OUTPATIENT
Start: 2023-05-23 | End: 2023-05-23

## 2023-05-23 RX ORDER — ONDANSETRON 2 MG/ML
4 INJECTION INTRAMUSCULAR; INTRAVENOUS ONCE AS NEEDED
Status: COMPLETED | OUTPATIENT
Start: 2023-05-23 | End: 2023-05-23

## 2023-05-23 RX ORDER — ONDANSETRON 2 MG/ML
INJECTION INTRAMUSCULAR; INTRAVENOUS AS NEEDED
Status: DISCONTINUED | OUTPATIENT
Start: 2023-05-23 | End: 2023-05-23

## 2023-05-23 RX ORDER — SODIUM CHLORIDE, SODIUM LACTATE, POTASSIUM CHLORIDE, CALCIUM CHLORIDE 600; 310; 30; 20 MG/100ML; MG/100ML; MG/100ML; MG/100ML
INJECTION, SOLUTION INTRAVENOUS CONTINUOUS PRN
Status: DISCONTINUED | OUTPATIENT
Start: 2023-05-23 | End: 2023-05-23

## 2023-05-23 RX ORDER — DIPHENHYDRAMINE HYDROCHLORIDE 50 MG/ML
12.5 INJECTION INTRAMUSCULAR; INTRAVENOUS ONCE AS NEEDED
Status: DISCONTINUED | OUTPATIENT
Start: 2023-05-23 | End: 2023-05-23 | Stop reason: HOSPADM

## 2023-05-23 RX ORDER — PROPOFOL 10 MG/ML
INJECTION, EMULSION INTRAVENOUS CONTINUOUS PRN
Status: DISCONTINUED | OUTPATIENT
Start: 2023-05-23 | End: 2023-05-23

## 2023-05-23 RX ORDER — MAGNESIUM HYDROXIDE 1200 MG/15ML
LIQUID ORAL AS NEEDED
Status: DISCONTINUED | OUTPATIENT
Start: 2023-05-23 | End: 2023-05-23 | Stop reason: HOSPADM

## 2023-05-23 RX ORDER — DEXAMETHASONE SODIUM PHOSPHATE 10 MG/ML
INJECTION, SOLUTION INTRAMUSCULAR; INTRAVENOUS AS NEEDED
Status: DISCONTINUED | OUTPATIENT
Start: 2023-05-23 | End: 2023-05-23

## 2023-05-23 RX ORDER — OXYCODONE HYDROCHLORIDE 5 MG/1
5 TABLET ORAL EVERY 4 HOURS PRN
Qty: 10 TABLET | Refills: 0 | Status: SHIPPED | OUTPATIENT
Start: 2023-05-23 | End: 2023-06-02

## 2023-05-23 RX ORDER — FENTANYL CITRATE 50 UG/ML
INJECTION, SOLUTION INTRAMUSCULAR; INTRAVENOUS AS NEEDED
Status: DISCONTINUED | OUTPATIENT
Start: 2023-05-23 | End: 2023-05-23

## 2023-05-23 RX ORDER — PROPOFOL 10 MG/ML
INJECTION, EMULSION INTRAVENOUS AS NEEDED
Status: DISCONTINUED | OUTPATIENT
Start: 2023-05-23 | End: 2023-05-23

## 2023-05-23 RX ADMIN — DEXMEDETOMIDINE HYDROCHLORIDE 4 MCG: 100 INJECTION, SOLUTION INTRAVENOUS at 08:08

## 2023-05-23 RX ADMIN — GLYCOPYRROLATE 0.2 MG: 0.2 INJECTION, SOLUTION INTRAMUSCULAR; INTRAVENOUS at 07:46

## 2023-05-23 RX ADMIN — SODIUM CHLORIDE, SODIUM LACTATE, POTASSIUM CHLORIDE, AND CALCIUM CHLORIDE: .6; .31; .03; .02 INJECTION, SOLUTION INTRAVENOUS at 07:28

## 2023-05-23 RX ADMIN — CEFAZOLIN SODIUM 2000 MG: 2 SOLUTION INTRAVENOUS at 07:29

## 2023-05-23 RX ADMIN — FENTANYL CITRATE 50 MCG: 50 INJECTION INTRAMUSCULAR; INTRAVENOUS at 07:32

## 2023-05-23 RX ADMIN — ROCURONIUM BROMIDE 10 MG: 10 SOLUTION INTRAVENOUS at 08:10

## 2023-05-23 RX ADMIN — FENTANYL CITRATE 25 MCG: 0.05 INJECTION, SOLUTION INTRAMUSCULAR; INTRAVENOUS at 10:00

## 2023-05-23 RX ADMIN — PROPOFOL 130 MCG/KG/MIN: 10 INJECTION, EMULSION INTRAVENOUS at 07:32

## 2023-05-23 RX ADMIN — DEXMEDETOMIDINE HYDROCHLORIDE 8 MCG: 100 INJECTION, SOLUTION INTRAVENOUS at 07:26

## 2023-05-23 RX ADMIN — FENTANYL CITRATE 25 MCG: 0.05 INJECTION, SOLUTION INTRAMUSCULAR; INTRAVENOUS at 10:39

## 2023-05-23 RX ADMIN — DEXAMETHASONE SODIUM PHOSPHATE 10 MG: 10 INJECTION, SOLUTION INTRAMUSCULAR; INTRAVENOUS at 07:32

## 2023-05-23 RX ADMIN — ROCURONIUM BROMIDE 10 MG: 10 SOLUTION INTRAVENOUS at 08:43

## 2023-05-23 RX ADMIN — LIDOCAINE HYDROCHLORIDE 50 MG: 10 INJECTION, SOLUTION EPIDURAL; INFILTRATION; INTRACAUDAL; PERINEURAL at 07:32

## 2023-05-23 RX ADMIN — ONDANSETRON 4 MG: 2 INJECTION INTRAMUSCULAR; INTRAVENOUS at 09:53

## 2023-05-23 RX ADMIN — FENTANYL CITRATE 50 MCG: 50 INJECTION INTRAMUSCULAR; INTRAVENOUS at 08:02

## 2023-05-23 RX ADMIN — DEXMEDETOMIDINE HYDROCHLORIDE 4 MCG: 100 INJECTION, SOLUTION INTRAVENOUS at 08:43

## 2023-05-23 RX ADMIN — ROCURONIUM BROMIDE 50 MG: 10 SOLUTION INTRAVENOUS at 07:32

## 2023-05-23 RX ADMIN — DEXMEDETOMIDINE HYDROCHLORIDE 4 MCG: 100 INJECTION, SOLUTION INTRAVENOUS at 08:50

## 2023-05-23 RX ADMIN — ONDANSETRON 4 MG: 2 INJECTION INTRAMUSCULAR; INTRAVENOUS at 07:32

## 2023-05-23 RX ADMIN — DEXMEDETOMIDINE HYDROCHLORIDE 4 MCG: 100 INJECTION, SOLUTION INTRAVENOUS at 08:31

## 2023-05-23 RX ADMIN — DEXMEDETOMIDINE HYDROCHLORIDE 12 MCG: 100 INJECTION, SOLUTION INTRAVENOUS at 09:21

## 2023-05-23 RX ADMIN — DEXMEDETOMIDINE HYDROCHLORIDE 4 MCG: 100 INJECTION, SOLUTION INTRAVENOUS at 08:23

## 2023-05-23 RX ADMIN — SODIUM CHLORIDE, SODIUM LACTATE, POTASSIUM CHLORIDE, CALCIUM CHLORIDE: 600; 310; 30; 20 INJECTION, SOLUTION INTRAVENOUS at 07:39

## 2023-05-23 RX ADMIN — PROPOFOL 180 MG: 10 INJECTION, EMULSION INTRAVENOUS at 07:32

## 2023-05-23 RX ADMIN — GLYCOPYRROLATE 0.2 MG: 0.2 INJECTION, SOLUTION INTRAMUSCULAR; INTRAVENOUS at 07:53

## 2023-05-23 RX ADMIN — SUGAMMADEX 200 MG: 100 INJECTION, SOLUTION INTRAVENOUS at 09:25

## 2023-05-23 NOTE — ANESTHESIA PREPROCEDURE EVALUATION
Procedure:  RIGHT ROBOTIC ASSISTED LAPAROSCOPIC INGUINAL HERNIA REPAIR, POSSIBLE LEFT (Groin)  OPEN INGUINAL HERNIA REPAIR (Groin)    Relevant Problems   Musculoskeletal and Integument   (+) Rotator cuff dysfunction, right      Other   (+) Acute pain of right shoulder             Anesthesia Plan  ASA Score- 2     Anesthesia Type- general with ASA Monitors  Additional Monitors:   Airway Plan: ETT  Plan Factors-    Chart reviewed  Induction- intravenous  Postoperative Plan-     Informed Consent- Anesthetic plan and risks discussed with patient  I personally reviewed this patient with the CRNA  Discussed and agreed on the Anesthesia Plan with the CRNA  Klaudia Hernandez

## 2023-05-23 NOTE — OP NOTE
OPERATIVE REPORT  PATIENT NAME: Jillian Forbes    :  1950  MRN: [id number]  Pt Location: [facility] OR ROOM 01    SURGERY DATE: 2023    Surgeon(s) and Role:     * Karel Rudolph, DO - Primary     * Katina Olea PA-C - Assisting    Preop Diagnosis:  RIght inguinal hernia, without obstruction or gangrene, not specified as recurrent [K40 20]    Post-Op Diagnosis Codes:     *right inguinal hernia, without obstruction or gangrene, not specified as recurrent [K40 20]    Procedure(s):  RIGHT ROBOTIC ASSISTED LAPAROSCOPIC INGUINAL HERNIA REPAIR  Specimen(s):  * No specimens in log *    Estimated Blood Loss:   Minimal    Drains:  [REMOVED] NG/OG/Enteral Tube Orogastric 18 Fr Center mouth (Removed)   Number of days: 0       Anesthesia Type:   General    Operative Indications:  Right inguinal hernia    Operative Findings:  Intra-abdominal adhesions of the right colon to the anterior abdominal wall in the right lower abdomen  Right inguinal hernia, pantaloon type  No evidence of left inguinal hernia    Complications:   None    Procedure and Technique:  The patient is brought to the operating room  A time-out was held and the patient was  identified and procedure verified  Appropriate antibiotic prophylaxis and DVT prophylaxis was used  General anesthesia was administered  The area of the abdomen is prepped and draped usual sterile fashion using chlorhexidine solution  Local anesthesia and 1% lidocaine with epinephrine was infiltrated in the supraumbilical area  A small incision was made using an 11 blade scalpel  The skin was grasped and elevated using towel clamps  A Veress needle was placed and confirmed to be intraperitoneal using a saline drop test   The abdomen is insufflated to15 mmHg intraperitoneal pressure  An 8 mm trocar was placed  The abdomen was inspected and found to be free of adhesions    An 8 mm robotic trocar was placed in the left lateral abdomen under direct visualization and after the infiltration of local anesthesia  An additional 8 mm robotic trocar was placed in the right lateral abdomen in the same fashion  The patient was slightly placed in Trendelenburg position  The Robotic cart was advanced into the operative field and appropriately docked  I then took control of the console  Adhesions were present of the colon to the right anterior abdominal wall  These were carefully taken down using sharp dissection  On evaluation of the inguinal area a right pantaloon inguinal hernia was noted, there was no evidence of left inguinal hernia  An incision was made in the peritoneum superior to the internal inguinal ring  The the preperitoneal space was then dissected inferiorly until Silvio's ligament was exposed medially and the transversalis fascia was exposed laterally  The hernia sac was then carefully reduced using blunt and sharp dissection  Care was taken to avoid the spermatic cord vessels and vas defferns  Once the sac was completely reduced dissection was further carried inferiorly  A 3D max MID right large  mesh was selected  This was introduced into the abdominal cavity  The mesh was then placed into the created pocket  The mesh was found to cover the inguinal hernia defect without difficulty  The mesh was secured medially to Silvio's ligament and laterally to the transversalis fascia  The repair appeared to be adequate  The peritoneal incision was closed using a running suture of 2 0 90 day absorbable V lock  The robot was undocked  All trocars were removed and the abdomen was desufflated  Skin incisions were closed using 4-0 Vicryl in a subcuticular layer  All incisions were covered with skin glue  The patient tolerated the procedure well was transferred to recovery stable condition  At the end the procedure all needle instrument sponge counts were correct x2  I was present for the entire procedure   and A physician assistant was required during the procedure for retraction, tissue handling, dissection and suturing      Patient Disposition:  PACU         SIGNATURE: Marilee Truong DO  DATE: May 23, 2023  TIME: 9:28 AM

## 2023-05-23 NOTE — DISCHARGE INSTR - AVS FIRST PAGE
Robotic Assisted inguinal hernia repair    DISCHARGE INSTRUCTIONS:   Call Dr Abhishek Vaughn office with any questions or concerns at [phone]    Call your local emergency number (911 in the [address]) if:   You feel lightheaded, short of breath, and have chest pain  You cough up a large amount of blood  Seek care immediately if:   Your arm or leg feels warm, tender, and painful  It may look swollen and red  You have blood clots or fluid around your surgery site  You have pain in your groin or surgery site that does not get better after you take pain medicine  You have trouble urinating  You suddenly have numbness in your groin area  Call your doctor or surgeon if:   You are bleeding more than expected from your surgery site  You have a fever  Your surgery site is swollen, red, or has pus coming from it  You have questions or concerns about your condition or care  Medicines:   Prescription pain medicine prescription strength ibuprofen has been provided  You may alternate this medication with Tylenol  Take Tylenol as directed on the bottle  Take your medicine as directed  Contact your healthcare provider if you think your medicine is not helping or if you have side effects  Tell him or her if you are allergic to any medicine  Keep a list of the medicines, vitamins, and herbs you take  Include the amounts, and when and why you take them  Bring the list or the pill bottles to follow-up visits  Carry your medicine list with you in case of an emergency  Activity:  Avoid heavy lifting, pushing, or pulling more than 10 lb for 2 weeks  You may walk as much as tolerated  He may also go up stairs  Do not perform any strenuous exercise for 2 weeks  Prevent or manage constipation: It is normal to feel constipated after surgery  If you do not move your bowels for 2 days you may take Colace 100 mg twice a day    If this does not result in a bowel movement, you may take MiraLax as directed on the bottle     Incisions: You have glue covering her incisions  You may shower and pat the incisions dry  Do not scrub over the incisions  Do not soak in a tub or hot tub for 2 weeks  He may apply ice to the incisions as needed for swelling or comfort  He may also apply ice to the groin area  Bruising and swelling of the groin area is expected  Follow up with your doctor or surgeon as directed:  Write down your questions so you remember to ask them during your visits

## 2023-05-23 NOTE — ANESTHESIA POSTPROCEDURE EVALUATION
Post-Op Assessment Note    CV Status:  Stable  Pain Score: 0    Pain management: adequate  Multimodal analgesia used between 6 hours prior to anesthesia start to PACU discharge    Mental Status:  Unresponsive   Hydration Status:  Euvolemic and stable   PONV Controlled:  None   Airway Patency:  Patent   Two or more mitigation strategies used for obstructive sleep apnea   Post Op Vitals Reviewed: Yes      Staff: CRNA         No notable events documented      /66 (05/23/23 0940)    Temp 97 6 °F (36 4 °C) (05/23/23 0940)    Pulse 73 (05/23/23 0940)   Resp 21 (05/23/23 0940)    SpO2 95 % (05/23/23 0940)

## 2023-05-23 NOTE — INTERVAL H&P NOTE
H&P reviewed  After examining the patient I find no changes in the patients condition since the H&P had been written      Vitals:    05/23/23 0630   BP: 138/74   Pulse: (!) 51   Resp: 18   Temp: 98 °F (36 7 °C)   SpO2: 94%

## 2024-08-11 ENCOUNTER — ANESTHESIA EVENT (INPATIENT)
Dept: PERIOP | Facility: HOSPITAL | Age: 74
End: 2024-08-11
Payer: COMMERCIAL

## 2024-08-11 ENCOUNTER — HOSPITAL ENCOUNTER (OUTPATIENT)
Facility: HOSPITAL | Age: 74
Setting detail: OBSERVATION
LOS: 1 days | Discharge: HOME | End: 2024-08-12
Attending: INTERNAL MEDICINE | Admitting: FAMILY MEDICINE
Payer: COMMERCIAL

## 2024-08-11 ENCOUNTER — ANESTHESIA (INPATIENT)
Dept: PERIOP | Facility: HOSPITAL | Age: 74
End: 2024-08-11
Payer: COMMERCIAL

## 2024-08-11 ENCOUNTER — HOSPITAL ENCOUNTER (EMERGENCY)
Facility: HOSPITAL | Age: 74
End: 2024-08-11
Attending: EMERGENCY MEDICINE | Admitting: EMERGENCY MEDICINE
Payer: COMMERCIAL

## 2024-08-11 VITALS
WEIGHT: 190 LBS | SYSTOLIC BLOOD PRESSURE: 137 MMHG | RESPIRATION RATE: 16 BRPM | OXYGEN SATURATION: 96 % | HEIGHT: 70 IN | TEMPERATURE: 97.7 F | DIASTOLIC BLOOD PRESSURE: 69 MMHG | HEART RATE: 65 BPM | BODY MASS INDEX: 27.2 KG/M2

## 2024-08-11 DIAGNOSIS — S39.840A: Primary | ICD-10-CM

## 2024-08-11 LAB
ALBUMIN SERPL BCG-MCNC: 4.4 G/DL (ref 3.5–5)
ALP SERPL-CCNC: 64 U/L (ref 34–104)
ALT SERPL W P-5'-P-CCNC: 16 U/L (ref 7–52)
ANION GAP SERPL CALCULATED.3IONS-SCNC: 7 MMOL/L (ref 4–13)
AST SERPL W P-5'-P-CCNC: 27 U/L (ref 13–39)
BASOPHILS # BLD AUTO: 0.05 THOUSANDS/ÂΜL (ref 0–0.1)
BASOPHILS NFR BLD AUTO: 1 % (ref 0–1)
BILIRUB SERPL-MCNC: 0.39 MG/DL (ref 0.2–1)
BILIRUB UR QL STRIP: NEGATIVE
BUN SERPL-MCNC: 17 MG/DL (ref 5–25)
CALCIUM SERPL-MCNC: 9.4 MG/DL (ref 8.4–10.2)
CHLORIDE SERPL-SCNC: 100 MMOL/L (ref 96–108)
CLARITY UR: CLEAR
CO2 SERPL-SCNC: 26 MMOL/L (ref 21–32)
COLOR UR: YELLOW
CREAT SERPL-MCNC: 1.07 MG/DL (ref 0.6–1.3)
EOSINOPHIL # BLD AUTO: 0.27 THOUSAND/ÂΜL (ref 0–0.61)
EOSINOPHIL NFR BLD AUTO: 4 % (ref 0–6)
ERYTHROCYTE [DISTWIDTH] IN BLOOD BY AUTOMATED COUNT: 13.2 % (ref 11.6–15.1)
GFR SERPL CREATININE-BSD FRML MDRD: 68 ML/MIN/1.73SQ M
GLUCOSE SERPL-MCNC: 100 MG/DL (ref 65–140)
GLUCOSE UR STRIP-MCNC: NEGATIVE MG/DL
HCT VFR BLD AUTO: 35 % (ref 36.5–49.3)
HGB BLD-MCNC: 12.1 G/DL (ref 12–17)
HGB UR QL STRIP.AUTO: NEGATIVE
IMM GRANULOCYTES # BLD AUTO: 0.03 THOUSAND/UL (ref 0–0.2)
IMM GRANULOCYTES NFR BLD AUTO: 0 % (ref 0–2)
KETONES UR STRIP-MCNC: NEGATIVE MG/DL
LEUKOCYTE ESTERASE UR QL STRIP: NEGATIVE
LYMPHOCYTES # BLD AUTO: 1.51 THOUSANDS/ÂΜL (ref 0.6–4.47)
LYMPHOCYTES NFR BLD AUTO: 21 % (ref 14–44)
MCH RBC QN AUTO: 31.3 PG (ref 26.8–34.3)
MCHC RBC AUTO-ENTMCNC: 34.6 G/DL (ref 31.4–37.4)
MCV RBC AUTO: 91 FL (ref 82–98)
MONOCYTES # BLD AUTO: 1.15 THOUSAND/ÂΜL (ref 0.17–1.22)
MONOCYTES NFR BLD AUTO: 16 % (ref 4–12)
NEUTROPHILS # BLD AUTO: 4.25 THOUSANDS/ÂΜL (ref 1.85–7.62)
NEUTS SEG NFR BLD AUTO: 58 % (ref 43–75)
NITRITE UR QL STRIP: NEGATIVE
NRBC BLD AUTO-RTO: 0 /100 WBCS
PH UR STRIP.AUTO: 6.5 [PH]
PLATELET # BLD AUTO: 199 THOUSANDS/UL (ref 149–390)
PMV BLD AUTO: 8.6 FL (ref 8.9–12.7)
POTASSIUM SERPL-SCNC: 3.8 MMOL/L (ref 3.5–5.3)
PROT SERPL-MCNC: 6.8 G/DL (ref 6.4–8.4)
PROT UR STRIP-MCNC: NEGATIVE MG/DL
RBC # BLD AUTO: 3.86 MILLION/UL (ref 3.88–5.62)
SODIUM SERPL-SCNC: 133 MMOL/L (ref 135–147)
SP GR UR STRIP.AUTO: <=1.005 (ref 1–1.03)
UROBILINOGEN UR QL STRIP.AUTO: 0.2 E.U./DL
WBC # BLD AUTO: 7.26 THOUSAND/UL (ref 4.31–10.16)

## 2024-08-11 PROCEDURE — 99223 1ST HOSP IP/OBS HIGH 75: CPT | Performed by: FAMILY MEDICINE

## 2024-08-11 PROCEDURE — 99223 1ST HOSP IP/OBS HIGH 75: CPT | Performed by: UROLOGY

## 2024-08-11 PROCEDURE — G0379 DIRECT REFER HOSPITAL OBSERV: HCPCS

## 2024-08-11 PROCEDURE — 85025 COMPLETE CBC W/AUTO DIFF WBC: CPT | Performed by: EMERGENCY MEDICINE

## 2024-08-11 PROCEDURE — 81003 URINALYSIS AUTO W/O SCOPE: CPT | Performed by: EMERGENCY MEDICINE

## 2024-08-11 PROCEDURE — 54440 REPAIR OF PENIS: CPT | Performed by: UROLOGY

## 2024-08-11 PROCEDURE — 99283 EMERGENCY DEPT VISIT LOW MDM: CPT

## 2024-08-11 PROCEDURE — 36415 COLL VENOUS BLD VENIPUNCTURE: CPT | Performed by: EMERGENCY MEDICINE

## 2024-08-11 PROCEDURE — 99284 EMERGENCY DEPT VISIT MOD MDM: CPT | Performed by: EMERGENCY MEDICINE

## 2024-08-11 PROCEDURE — 80053 COMPREHEN METABOLIC PANEL: CPT | Performed by: EMERGENCY MEDICINE

## 2024-08-11 RX ORDER — ENOXAPARIN SODIUM 100 MG/ML
40 INJECTION SUBCUTANEOUS DAILY
Status: DISCONTINUED | OUTPATIENT
Start: 2024-08-12 | End: 2024-08-12 | Stop reason: HOSPADM

## 2024-08-11 RX ORDER — ATORVASTATIN CALCIUM 10 MG/1
10 TABLET, FILM COATED ORAL
Status: DISCONTINUED | OUTPATIENT
Start: 2024-08-11 | End: 2024-08-12 | Stop reason: HOSPADM

## 2024-08-11 RX ORDER — SENNOSIDES 8.6 MG
1 TABLET ORAL DAILY
Status: DISCONTINUED | OUTPATIENT
Start: 2024-08-11 | End: 2024-08-12 | Stop reason: HOSPADM

## 2024-08-11 RX ORDER — ROCURONIUM BROMIDE 10 MG/ML
INJECTION, SOLUTION INTRAVENOUS AS NEEDED
Status: DISCONTINUED | OUTPATIENT
Start: 2024-08-11 | End: 2024-08-11

## 2024-08-11 RX ORDER — LORATADINE 10 MG/1
10 TABLET ORAL DAILY
Status: DISCONTINUED | OUTPATIENT
Start: 2024-08-11 | End: 2024-08-12 | Stop reason: HOSPADM

## 2024-08-11 RX ORDER — ONDANSETRON 2 MG/ML
4 INJECTION INTRAMUSCULAR; INTRAVENOUS EVERY 6 HOURS PRN
Status: DISCONTINUED | OUTPATIENT
Start: 2024-08-11 | End: 2024-08-12 | Stop reason: HOSPADM

## 2024-08-11 RX ORDER — ONDANSETRON 2 MG/ML
INJECTION INTRAMUSCULAR; INTRAVENOUS AS NEEDED
Status: DISCONTINUED | OUTPATIENT
Start: 2024-08-11 | End: 2024-08-11

## 2024-08-11 RX ORDER — ONDANSETRON 2 MG/ML
4 INJECTION INTRAMUSCULAR; INTRAVENOUS ONCE AS NEEDED
Status: DISCONTINUED | OUTPATIENT
Start: 2024-08-11 | End: 2024-08-11 | Stop reason: HOSPADM

## 2024-08-11 RX ORDER — ALBUTEROL SULFATE 90 UG/1
AEROSOL, METERED RESPIRATORY (INHALATION) AS NEEDED
Status: DISCONTINUED | OUTPATIENT
Start: 2024-08-11 | End: 2024-08-11

## 2024-08-11 RX ORDER — GABAPENTIN 300 MG/1
600 CAPSULE ORAL 2 TIMES DAILY
Status: DISCONTINUED | OUTPATIENT
Start: 2024-08-11 | End: 2024-08-12 | Stop reason: HOSPADM

## 2024-08-11 RX ORDER — B-COMPLEX WITH VITAMIN C
1 TABLET ORAL DAILY
Status: DISCONTINUED | OUTPATIENT
Start: 2024-08-11 | End: 2024-08-11

## 2024-08-11 RX ORDER — HYDROCODONE BITARTRATE AND ACETAMINOPHEN 5; 325 MG/1; MG/1
1 TABLET ORAL EVERY 6 HOURS PRN
Status: DISCONTINUED | OUTPATIENT
Start: 2024-08-11 | End: 2024-08-12 | Stop reason: HOSPADM

## 2024-08-11 RX ORDER — FENTANYL CITRATE 50 UG/ML
INJECTION, SOLUTION INTRAMUSCULAR; INTRAVENOUS AS NEEDED
Status: DISCONTINUED | OUTPATIENT
Start: 2024-08-11 | End: 2024-08-11

## 2024-08-11 RX ORDER — PROPOFOL 10 MG/ML
INJECTION, EMULSION INTRAVENOUS CONTINUOUS PRN
Status: DISCONTINUED | OUTPATIENT
Start: 2024-08-11 | End: 2024-08-11

## 2024-08-11 RX ORDER — HYDROMORPHONE HCL/PF 1 MG/ML
0.5 SYRINGE (ML) INJECTION
Status: DISCONTINUED | OUTPATIENT
Start: 2024-08-11 | End: 2024-08-11 | Stop reason: HOSPADM

## 2024-08-11 RX ORDER — ASPIRIN 325 MG
325 TABLET ORAL DAILY
Status: DISCONTINUED | OUTPATIENT
Start: 2024-08-12 | End: 2024-08-12 | Stop reason: HOSPADM

## 2024-08-11 RX ORDER — DOXAZOSIN 4 MG/1
4 TABLET ORAL DAILY
Status: DISCONTINUED | OUTPATIENT
Start: 2024-08-11 | End: 2024-08-12 | Stop reason: HOSPADM

## 2024-08-11 RX ORDER — DIPHENHYDRAMINE HYDROCHLORIDE 50 MG/ML
12.5 INJECTION INTRAMUSCULAR; INTRAVENOUS ONCE AS NEEDED
Status: DISCONTINUED | OUTPATIENT
Start: 2024-08-11 | End: 2024-08-11 | Stop reason: HOSPADM

## 2024-08-11 RX ORDER — CEFAZOLIN SODIUM 2 G/50ML
2000 SOLUTION INTRAVENOUS ONCE
Status: COMPLETED | OUTPATIENT
Start: 2024-08-11 | End: 2024-08-11

## 2024-08-11 RX ORDER — POLYETHYLENE GLYCOL 3350 17 G/17G
17 POWDER, FOR SOLUTION ORAL DAILY
Status: DISCONTINUED | OUTPATIENT
Start: 2024-08-11 | End: 2024-08-12 | Stop reason: HOSPADM

## 2024-08-11 RX ORDER — CEPHALEXIN 500 MG/1
500 CAPSULE ORAL EVERY 6 HOURS SCHEDULED
Qty: 12 CAPSULE | Refills: 0 | Status: SHIPPED | OUTPATIENT
Start: 2024-08-11 | End: 2024-08-14

## 2024-08-11 RX ORDER — DOCUSATE SODIUM 100 MG/1
100 CAPSULE, LIQUID FILLED ORAL 2 TIMES DAILY
Status: DISCONTINUED | OUTPATIENT
Start: 2024-08-11 | End: 2024-08-12 | Stop reason: HOSPADM

## 2024-08-11 RX ORDER — ALBUTEROL SULFATE 90 UG/1
2 AEROSOL, METERED RESPIRATORY (INHALATION) EVERY 4 HOURS PRN
Status: DISCONTINUED | OUTPATIENT
Start: 2024-08-11 | End: 2024-08-12 | Stop reason: HOSPADM

## 2024-08-11 RX ORDER — AMLODIPINE BESYLATE 5 MG/1
5 TABLET ORAL DAILY
Status: DISCONTINUED | OUTPATIENT
Start: 2024-08-11 | End: 2024-08-12 | Stop reason: HOSPADM

## 2024-08-11 RX ORDER — PANTOPRAZOLE SODIUM 40 MG/1
40 TABLET, DELAYED RELEASE ORAL
Status: DISCONTINUED | OUTPATIENT
Start: 2024-08-12 | End: 2024-08-12 | Stop reason: HOSPADM

## 2024-08-11 RX ORDER — ACETAMINOPHEN 325 MG/1
650 TABLET ORAL EVERY 6 HOURS PRN
Status: DISCONTINUED | OUTPATIENT
Start: 2024-08-11 | End: 2024-08-12 | Stop reason: HOSPADM

## 2024-08-11 RX ORDER — BUPIVACAINE HYDROCHLORIDE 5 MG/ML
INJECTION, SOLUTION EPIDURAL; INTRACAUDAL AS NEEDED
Status: DISCONTINUED | OUTPATIENT
Start: 2024-08-11 | End: 2024-08-11 | Stop reason: HOSPADM

## 2024-08-11 RX ORDER — MAGNESIUM HYDROXIDE/ALUMINUM HYDROXICE/SIMETHICONE 120; 1200; 1200 MG/30ML; MG/30ML; MG/30ML
30 SUSPENSION ORAL EVERY 6 HOURS PRN
Status: DISCONTINUED | OUTPATIENT
Start: 2024-08-11 | End: 2024-08-12 | Stop reason: HOSPADM

## 2024-08-11 RX ORDER — METOPROLOL SUCCINATE 50 MG/1
50 TABLET, EXTENDED RELEASE ORAL DAILY
Status: DISCONTINUED | OUTPATIENT
Start: 2024-08-11 | End: 2024-08-12 | Stop reason: HOSPADM

## 2024-08-11 RX ORDER — HYDROCODONE BITARTRATE AND ACETAMINOPHEN 5; 325 MG/1; MG/1
1-2 TABLET ORAL EVERY 6 HOURS PRN
Qty: 5 TABLET | Refills: 0 | Status: SHIPPED | OUTPATIENT
Start: 2024-08-11

## 2024-08-11 RX ORDER — DEXAMETHASONE SODIUM PHOSPHATE 10 MG/ML
INJECTION, SOLUTION INTRAMUSCULAR; INTRAVENOUS AS NEEDED
Status: DISCONTINUED | OUTPATIENT
Start: 2024-08-11 | End: 2024-08-11

## 2024-08-11 RX ORDER — CEFAZOLIN SODIUM 2 G/50ML
2000 SOLUTION INTRAVENOUS ONCE
Status: CANCELLED | OUTPATIENT
Start: 2024-08-11 | End: 2024-08-11

## 2024-08-11 RX ORDER — SODIUM CHLORIDE, SODIUM LACTATE, POTASSIUM CHLORIDE, CALCIUM CHLORIDE 600; 310; 30; 20 MG/100ML; MG/100ML; MG/100ML; MG/100ML
100 INJECTION, SOLUTION INTRAVENOUS CONTINUOUS
Status: CANCELLED | OUTPATIENT
Start: 2024-08-11

## 2024-08-11 RX ORDER — FENTANYL CITRATE/PF 50 MCG/ML
25 SYRINGE (ML) INJECTION
Status: DISCONTINUED | OUTPATIENT
Start: 2024-08-11 | End: 2024-08-11 | Stop reason: HOSPADM

## 2024-08-11 RX ORDER — SODIUM CHLORIDE, SODIUM LACTATE, POTASSIUM CHLORIDE, CALCIUM CHLORIDE 600; 310; 30; 20 MG/100ML; MG/100ML; MG/100ML; MG/100ML
INJECTION, SOLUTION INTRAVENOUS CONTINUOUS PRN
Status: DISCONTINUED | OUTPATIENT
Start: 2024-08-11 | End: 2024-08-11

## 2024-08-11 RX ORDER — CEFAZOLIN SODIUM 1 G/50ML
1000 SOLUTION INTRAVENOUS EVERY 8 HOURS
Status: DISCONTINUED | OUTPATIENT
Start: 2024-08-11 | End: 2024-08-12

## 2024-08-11 RX ORDER — PROPOFOL 10 MG/ML
INJECTION, EMULSION INTRAVENOUS AS NEEDED
Status: DISCONTINUED | OUTPATIENT
Start: 2024-08-11 | End: 2024-08-11

## 2024-08-11 RX ADMIN — SUGAMMADEX 172 MG: 100 INJECTION, SOLUTION INTRAVENOUS at 12:12

## 2024-08-11 RX ADMIN — ATORVASTATIN CALCIUM 10 MG: 10 TABLET, FILM COATED ORAL at 17:05

## 2024-08-11 RX ADMIN — PROPOFOL 80 MCG/KG/MIN: 10 INJECTION, EMULSION INTRAVENOUS at 11:16

## 2024-08-11 RX ADMIN — METOPROLOL SUCCINATE 50 MG: 50 TABLET, EXTENDED RELEASE ORAL at 15:28

## 2024-08-11 RX ADMIN — FENTANYL CITRATE 50 MCG: 50 INJECTION INTRAMUSCULAR; INTRAVENOUS at 11:13

## 2024-08-11 RX ADMIN — ROCURONIUM BROMIDE 50 MG: 10 INJECTION, SOLUTION INTRAVENOUS at 11:13

## 2024-08-11 RX ADMIN — ALBUTEROL SULFATE 2 PUFF: 90 AEROSOL, METERED RESPIRATORY (INHALATION) at 11:10

## 2024-08-11 RX ADMIN — DOXAZOSIN 4 MG: 4 TABLET ORAL at 15:27

## 2024-08-11 RX ADMIN — AMLODIPINE BESYLATE 5 MG: 5 TABLET ORAL at 15:27

## 2024-08-11 RX ADMIN — PROPOFOL 100 MG: 10 INJECTION, EMULSION INTRAVENOUS at 11:13

## 2024-08-11 RX ADMIN — SODIUM CHLORIDE, SODIUM LACTATE, POTASSIUM CHLORIDE, AND CALCIUM CHLORIDE: .6; .31; .03; .02 INJECTION, SOLUTION INTRAVENOUS at 11:02

## 2024-08-11 RX ADMIN — DOCUSATE SODIUM 100 MG: 100 CAPSULE, LIQUID FILLED ORAL at 17:05

## 2024-08-11 RX ADMIN — CEFAZOLIN SODIUM 1000 MG: 1 SOLUTION INTRAVENOUS at 19:46

## 2024-08-11 RX ADMIN — CEFAZOLIN SODIUM 2000 MG: 2 SOLUTION INTRAVENOUS at 11:10

## 2024-08-11 RX ADMIN — HYDROCODONE BITARTRATE AND ACETAMINOPHEN 1 TABLET: 5; 325 TABLET ORAL at 18:34

## 2024-08-11 RX ADMIN — PHENYLEPHRINE HYDROCHLORIDE 40 MCG/MIN: 10 INJECTION INTRAVENOUS at 11:16

## 2024-08-11 RX ADMIN — LORATADINE 10 MG: 10 TABLET ORAL at 15:27

## 2024-08-11 RX ADMIN — PROPOFOL 40 MG: 10 INJECTION, EMULSION INTRAVENOUS at 11:14

## 2024-08-11 RX ADMIN — DEXAMETHASONE SODIUM PHOSPHATE 10 MG: 10 INJECTION, SOLUTION INTRAMUSCULAR; INTRAVENOUS at 11:17

## 2024-08-11 RX ADMIN — PROPOFOL 20 MG: 10 INJECTION, EMULSION INTRAVENOUS at 12:04

## 2024-08-11 RX ADMIN — SENNOSIDES 8.6 MG: 8.6 TABLET, FILM COATED ORAL at 15:28

## 2024-08-11 RX ADMIN — FENTANYL CITRATE 50 MCG: 50 INJECTION INTRAMUSCULAR; INTRAVENOUS at 11:31

## 2024-08-11 RX ADMIN — ONDANSETRON 4 MG: 2 INJECTION INTRAMUSCULAR; INTRAVENOUS at 11:17

## 2024-08-11 RX ADMIN — GABAPENTIN 600 MG: 300 CAPSULE ORAL at 17:05

## 2024-08-11 NOTE — QUICK NOTE
Patient underwent degloving of the penis and repair of a penile fracture ventral left corpus cavernosum near the base.  Coban dressing for compression, this stay on for 24 hours and can be removed tomorrow and he may shower.  The stitches will dissolve on their own.    The patient lives alone and was transferred here by ambulance from [facility].  There may be transportation issues postoperatively.  From my standpoint, the patient can be discharged whenever stable.  Prescriptions for Norco and Keflex already sent to his pharmacy.  Instructions are on epic in the chart.

## 2024-08-11 NOTE — ANESTHESIA POSTPROCEDURE EVALUATION
Post-Op Assessment Note    CV Status:  Stable  Pain Score: 0    Pain management: adequate       Mental Status:  Alert and awake   Hydration Status:  Stable   PONV Controlled:  None   Airway Patency:  Patent     Post Op Vitals Reviewed: Yes    No anethesia notable event occurred.    Staff: CRNA               /72 (08/11/24 1223)    Temp (!) 97.4 °F (36.3 °C) (08/11/24 1223)    Pulse 66 (08/11/24 1223)   Resp 17 (08/11/24 1223)    SpO2 99 % (08/11/24 1223)

## 2024-08-11 NOTE — ED NOTES
Pt rang call bell requesting a container for his dentures. Gave pt a container and cleaning tablet for it. Pt did not need anything else.      Demetria Flaherty MA  08/11/24 7842

## 2024-08-11 NOTE — ASSESSMENT & PLAN NOTE
Patient is on amlodipine and metoprolol as outpatient continue with outpatient regimen and monitor blood pressure

## 2024-08-11 NOTE — ASSESSMENT & PLAN NOTE
Patient was transferred to [facility] from [facility] due to concern for pain and fracture.    Urology took the patient to the OR for repair of the Cason fracture  Tolerated procedure well  Cleared from urology standpoint for discharge  Pressure dressing can come off tomorrow  We will monitor overnight in the hospital.  IV antibiotics  Pain control

## 2024-08-11 NOTE — EMTALA/ACUTE CARE TRANSFER
[facility] EMERGENCY DEPARTMENT  [address] [address]  Dept: [phone]      EMTALA TRANSFER CONSENT    NAME Chavo Reyes                                         1950                              MRN [id number]    I have been informed of my rights regarding examination, treatment, and transfer   by Dr. Ariel Bergman MD    Benefits: Specialized equipment and/or services available at the receiving facility (Include comment)________________________    Risks: Potential for delay in receiving treatment, Potential deterioration of medical condition, Loss of IV, Increased discomfort during transfer, Possible worsening of condition or death during transfer      Consent for Transfer:  I acknowledge that my medical condition has been evaluated and explained to me by the emergency department physician or other qualified medical person and/or my attending physician, who has recommended that I be transferred to the service of  Accepting Physician: Dr. Brian TRUONG at Accepting Facility Name, City & State : [facility]. The above potential benefits of such transfer, the potential risks associated with such transfer, and the probable risks of not being transferred have been explained to me, and I fully understand them.  The doctor has explained that, in my case, the benefits of transfer outweigh the risks.  I agree to be transferred.    I authorize the performance of emergency medical procedures and treatments upon me in both transit and upon arrival at the receiving facility.  Additionally, I authorize the release of any and all medical records to the receiving facility and request they be transported with me, if possible.  I understand that the safest mode of transportation during a medical emergency is an ambulance and that the Hospital advocates the use of this mode of transport. Risks of traveling to the receiving facility by car, including absence of medical control, life  sustaining equipment, such as oxygen, and medical personnel has been explained to me and I fully understand them.    (JANESSA CORRECT BOX BELOW)  [  ]  I consent to the stated transfer and to be transported by ambulance/helicopter.  [  ]  I consent to the stated transfer, but refuse transportation by ambulance and accept full responsibility for my transportation by car.  I understand the risks of non-ambulance transfers and I exonerate the Hospital and its staff from any deterioration in my condition that results from this refusal.    X___________________________________________    DATE  24  TIME________  Signature of patient or legally responsible individual signing on patient behalf           RELATIONSHIP TO PATIENT_________________________          Provider Certification    NAME Chavo Reyes                                        [facility] 1950                              MRN [id number]    A medical screening exam was performed on the above named patient.  Based on the examination:    Condition Necessitating Transfer The encounter diagnosis was Rupture of corpus cavernosum of penis, initial encounter.    Patient Condition: The patient has been stabilized such that within reasonable medical probability, no material deterioration of the patient condition or the condition of the unborn child(tianna) is likely to result from the transfer    Reason for Transfer: Level of Care needed not available at this facility    Transfer Requirements: Facility [facility]   Space available and qualified personnel available for treatment as acknowledged by    Agreed to accept transfer and to provide appropriate medical treatment as acknowledged by       Dr. Brian TRUONG  Appropriate medical records of the examination and treatment of the patient are provided at the time of transfer   STAFF INITIAL WHEN COMPLETED _______  Transfer will be performed by qualified personnel from    and appropriate transfer equipment as required, including  the use of necessary and appropriate life support measures.    Provider Certification: I have examined the patient and explained the following risks and benefits of being transferred/refusing transfer to the patient/family:  General risk, such as traffic hazards, adverse weather conditions, rough terrain or turbulence, possible failure of equipment (including vehicle or aircraft), or consequences of actions of persons outside the control of the transport personnel, Unanticipated needs of medical equipment and personnel during transport, Risk of worsening condition, The possibility of a transport vehicle being unavailable      Based on these reasonable risks and benefits to the patient and/or the unborn child(tianna), and based upon the information available at the time of the patient’s examination, I certify that the medical benefits reasonably to be expected from the provision of appropriate medical treatments at another medical facility outweigh the increasing risks, if any, to the individual’s medical condition, and in the case of labor to the unborn child, from effecting the transfer.    X____________________________________________ DATE 08/11/24        TIME_______      ORIGINAL - SEND TO MEDICAL RECORDS   COPY - SEND WITH PATIENT DURING TRANSFER

## 2024-08-11 NOTE — ED PROVIDER NOTES
"History  Chief Complaint   Patient presents with    Penis Injury     Pt reports \" I was having sex and my penis bent back and is all bruised and swollen\"        History provided by:  Medical records and patient  Medical Problem  Location:  Injury to penis  Severity:  Moderate  Onset quality:  Sudden  Duration:  1 hour  Timing:  Constant  Progression:  Unchanged  Chronicity:  New  Context:  The patient was having vaginal intercourse 1 hour prior to arrival, states he went to insert his penis upon thrust, missed, pushing into her mons pubis, causing injury to the penis.  He was able to finish intercourse and ejaculate, was also able to urinate after intercourse.  Relieved by:  Nothing  Worsened by:  Nothing  Ineffective treatments:  None tried  Associated symptoms: no abdominal pain, no chest pain, no cough, no ear pain, no fatigue, no fever, no headaches, no nausea, no rash, no rhinorrhea, no shortness of breath, no sore throat and no vomiting        Prior to Admission Medications   Prescriptions Last Dose Informant Patient Reported? Taking?   Ascorbic Acid (vitamin C) 100 MG tablet   Yes No   Sig: Take 100 mg by mouth daily   B Complex Vitamins (Vitamin B Complex) TABS   Yes No   Sig: Take 1 tablet by mouth in the morning.   Multiple Vitamins-Iron TABS   Yes No   Sig: Take by mouth   Olopatadine HCl (Pataday) 0.7 % SOLN   Yes No   Sig: Apply to eye   Potassium 99 MG TABS   Yes No   Sig: Take 99 mg by mouth in the morning.   SUMAtriptan (IMITREX) 100 mg tablet   Yes No   UNKNOWN TO PATIENT   Yes No   albuterol (PROVENTIL HFA,VENTOLIN HFA) 90 mcg/act inhaler   No No   Si-3 inhalations every 3-4 hours for 3 days and then as needed for cough, wheezing   amLODIPine (NORVASC) 5 mg tablet   Yes No   Si mg daily   aspirin 325 mg tablet   Yes No   Sig: Take 325 mg by mouth daily   atorvastatin (LIPITOR) 40 mg tablet   Yes No   Sig: Take by mouth daily   dicyclomine (BENTYL) 20 mg tablet   No No   Sig: Take 1 tablet " (20 mg total) by mouth 2 (two) times a day   doxazosin (CARDURA) 4 mg tablet   Yes No   Sig: daily at bedtime   gabapentin (NEURONTIN) 600 MG tablet   Yes No   Si mg 2 (two) times a day   loratadine (CLARITIN) 10 mg tablet   Yes No   metoprolol succinate (TOPROL-XL) 50 mg 24 hr tablet   Yes No   Sig: daily   nitroglycerin (NITROSTAT) 0.4 mg SL tablet   Yes No   omeprazole (PriLOSEC) 40 MG capsule   Yes No   Sig: daily   ondansetron (Zofran ODT) 4 mg disintegrating tablet   No No   Sig: Take 1 tablet (4 mg total) by mouth every 6 (six) hours as needed for nausea or vomiting   simvastatin (ZOCOR) 20 mg tablet   Yes No   Sig: Take 20 mg by mouth      Facility-Administered Medications: None       Past Medical History:   Diagnosis Date    BPH (benign prostatic hyperplasia)     Family history of malignant hyperthermia     GERD (gastroesophageal reflux disease)     Heart problem     Hyperlipidemia     Hypertension     Neuropathy     Seasonal allergies     TIA (transient ischemic attack)        Past Surgical History:   Procedure Laterality Date    APPENDECTOMY      CAROTID ARTERY ANGIOPLASTY      CARPAL TUNNEL RELEASE      COLONOSCOPY      AZ LAPAROSCOPY SURG RPR INITIAL INGUINAL HERNIA N/A 2023    Procedure: RIGHT ROBOTIC ASSISTED LAPAROSCOPIC INGUINAL HERNIA REPAIR, POSSIBLE LEFT;  Surgeon: Orquidea Fritz DO;  Location:  MAIN OR;  Service: General       History reviewed. No pertinent family history.  I have reviewed and agree with the history as documented.    E-Cigarette/Vaping    E-Cigarette Use Never User      E-Cigarette/Vaping Substances    Nicotine No     THC No     CBD No     Flavoring No     Other No     Unknown No      Social History     Tobacco Use    Smoking status: Former     Current packs/day: 0.00     Types: Cigarettes     Quit date:      Years since quittin.6    Smokeless tobacco: Never   Vaping Use    Vaping status: Never Used   Substance Use Topics    Alcohol use: Not  Currently    Drug use: Never       Review of Systems   Constitutional:  Negative for appetite change, chills, fatigue and fever.   HENT:  Negative for ear pain, rhinorrhea, sore throat and trouble swallowing.    Eyes:  Negative for pain, discharge and visual disturbance.   Respiratory:  Negative for cough, chest tightness and shortness of breath.    Cardiovascular:  Negative for chest pain and palpitations.   Gastrointestinal:  Negative for abdominal pain, nausea and vomiting.   Endocrine: Negative for polydipsia, polyphagia and polyuria.   Genitourinary:  Positive for penile pain and penile swelling. Negative for decreased urine volume, difficulty urinating, dysuria, flank pain, frequency, genital sores, hematuria, penile discharge, scrotal swelling, testicular pain and urgency.   Musculoskeletal:  Negative for arthralgias and back pain.   Skin:  Negative for color change and rash.   Allergic/Immunologic: Negative for immunocompromised state.   Neurological:  Negative for dizziness, seizures, syncope, weakness and headaches.   Hematological:  Negative for adenopathy.   Psychiatric/Behavioral:  Negative for confusion and dysphoric mood.    All other systems reviewed and are negative.      Physical Exam  Physical Exam  Vitals and nursing note reviewed.   Constitutional:       General: He is not in acute distress.     Appearance: Normal appearance. He is not ill-appearing, toxic-appearing or diaphoretic.   HENT:      Head: Normocephalic and atraumatic.      Nose: Nose normal. No congestion or rhinorrhea.      Mouth/Throat:      Mouth: Mucous membranes are moist.      Pharynx: Oropharynx is clear. No oropharyngeal exudate or posterior oropharyngeal erythema.   Eyes:      General:         Right eye: No discharge.         Left eye: No discharge.   Cardiovascular:      Rate and Rhythm: Normal rate and regular rhythm.      Pulses: Normal pulses.      Heart sounds: Normal heart sounds. No murmur heard.     No gallop.    Pulmonary:      Effort: Pulmonary effort is normal. No respiratory distress.      Breath sounds: Normal breath sounds. No stridor. No wheezing, rhonchi or rales.   Chest:      Chest wall: No tenderness.   Abdominal:      General: Bowel sounds are normal. There is no distension.      Palpations: Abdomen is soft. There is no mass.      Tenderness: There is no abdominal tenderness. There is no right CVA tenderness, left CVA tenderness, guarding or rebound.      Hernia: No hernia is present.   Genitourinary:     Comments: Moderate swelling to the penile shaft, gross ecchymosis of the penile shaft and the adjacent mons pubis  Musculoskeletal:         General: Normal range of motion.      Cervical back: Normal range of motion and neck supple.   Skin:     General: Skin is warm and dry.      Capillary Refill: Capillary refill takes less than 2 seconds.   Neurological:      General: No focal deficit present.      Mental Status: He is alert and oriented to person, place, and time.      Cranial Nerves: No cranial nerve deficit.      Sensory: No sensory deficit.      Motor: No weakness.      Coordination: Coordination normal.      Gait: Gait normal.      Deep Tendon Reflexes: Reflexes normal.   Psychiatric:         Mood and Affect: Mood normal.         Behavior: Behavior normal.         Thought Content: Thought content normal.         Judgment: Judgment normal.         Vital Signs  ED Triage Vitals [08/11/24 0348]   Temperature Pulse Respirations Blood Pressure SpO2   97.7 °F (36.5 °C) 62 14 151/76 95 %      Temp Source Heart Rate Source Patient Position - Orthostatic VS BP Location FiO2 (%)   Temporal Monitor Sitting Left arm --      Pain Score       6           Vitals:    08/11/24 0348   BP: 151/76   Pulse: 62   Patient Position - Orthostatic VS: Sitting         Visual Acuity      ED Medications  Medications - No data to display    Diagnostic Studies  Results Reviewed       Procedure Component Value Units Date/Time    UA w  Reflex to Microscopic w Reflex to Culture [[id number]] Collected: 08/11/24 0523    Lab Status: No result Specimen: Urine, Clean Catch     Comprehensive metabolic panel [[id number]]  (Abnormal) Collected: 08/11/24 0444    Lab Status: Final result Specimen: Blood from Arm, Left Updated: 08/11/24 0507     Sodium 133 mmol/L      Potassium 3.8 mmol/L      Chloride 100 mmol/L      CO2 26 mmol/L      ANION GAP 7 mmol/L      BUN 17 mg/dL      Creatinine 1.07 mg/dL      Glucose 100 mg/dL      Calcium 9.4 mg/dL      AST 27 U/L      ALT 16 U/L      Alkaline Phosphatase 64 U/L      Total Protein 6.8 g/dL      Albumin 4.4 g/dL      Total Bilirubin 0.39 mg/dL      eGFR 68 ml/min/1.73sq m     Narrative:      National Kid[employer] Foundation guidelines for Chronic Kidney Disease (CKD):     Stage 1 with normal or high GFR (GFR > 90 mL/min/1.73 square meters)    Stage 2 Mild CKD (GFR = 60-89 mL/min/1.73 square meters)    Stage 3A Moderate CKD (GFR = 45-59 mL/min/1.73 square meters)    Stage 3B Moderate CKD (GFR = 30-44 mL/min/1.73 square meters)    Stage 4 Severe CKD (GFR = 15-29 mL/min/1.73 square meters)    Stage 5 End Stage CKD (GFR <15 mL/min/1.73 square meters)  Note: GFR calculation is accurate only with a steady state creatinine    CBC and differential [[id number]]  (Abnormal) Collected: 08/11/24 0444    Lab Status: Final result Specimen: Blood from Arm, Left Updated: 08/11/24 0451     WBC 7.26 Thousand/uL      RBC 3.86 Million/uL      Hemoglobin 12.1 g/dL      Hematocrit 35.0 %      MCV 91 fL      MCH 31.3 pg      MCHC 34.6 g/dL      RDW 13.2 %      MPV 8.6 fL      Platelets 199 Thousands/uL      nRBC 0 /100 WBCs      Segmented % 58 %      Immature Grans % 0 %      Lymphocytes % 21 %      Monocytes % 16 %      Eosinophils Relative 4 %      Basophils Relative 1 %      Absolute Neutrophils 4.25 Thousands/µL      Absolute Immature Grans 0.03 Thousand/uL      Absolute Lymphocytes 1.51 Thousands/µL      Absolute Monocytes 1.15  Thousand/µL      Eosinophils Absolute 0.27 Thousand/µL      Basophils Absolute 0.05 Thousands/µL                    No orders to display              Procedures  Procedures         ED Course                                 SBIRT 20yo+      Flowsheet Row Most Recent Value   Initial Alcohol Screen: US AUDIT-C     1. How often do you have a drink containing alcohol? 0 Filed at: 08/11/2024 0348   2. How many drinks containing alcohol do you have on a typical day you are drinking?  0 Filed at: 08/11/2024 0348   3a. Male UNDER 65: How often do you have five or more drinks on one occasion? 0 Filed at: 08/11/2024 0348   3b. FEMALE Any Age, or MALE 65+: How often do you have 4 or more drinks on one occassion? 0 Filed at: 08/11/2024 0348   Audit-C Score 0 Filed at: 08/11/2024 0348   DUGLAS: How many times in the past year have you...    Used an illegal drug or used a prescription medication for non-medical reasons? Never Filed at: 08/11/2024 0348                      Medical Decision Making  0347: Patient appears well, vital signs reviewed.  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  The patient is able to urinate without difficulty.  I will consult urology for assistance with care.    0401: Discussed with urology, they are requesting transfer to [facility] under [facility] for potential surgery today.    0505: Discussed with Dr. Brian TRUONG, accepts for transfer to [facility].    Amount and/or Complexity of Data Reviewed  Labs: ordered.  Discussion of management or test interpretation with external provider(s): Dr. Estephania Regalado urology                 Disposition  Final diagnoses:   Rupture of corpus cavernosum of penis, initial encounter     Time reflects when diagnosis was documented in both MDM as applicable and the Disposition within this note       Time User Action Codes Description Comment    8/11/2024  3:59 AM Ariel Bergman Add [V95.541D] Rupture of corpus cavernosum of penis, initial encounter            ED Disposition       ED Disposition   Transfer to Another Facility-In Network    Condition   --    Date/Time   Sun Aug 11, 2024 0437    Comment                  MD Documentation      Flowsheet Row Most Recent Value   Patient Condition The patient has been stabilized such that within reasonable medical probability, no material deterioration of the patient condition or the condition of the unborn child(tianna) is likely to result from the transfer   Reason for Transfer Level of Care needed not available at this facility   Benefits of Transfer Specialized equipment and/or services available at the receiving facility (Include comment)________________________   Risks of Transfer Potential for delay in receiving treatment, Potential deterioration of medical condition, Loss of IV, Increased discomfort during transfer, Possible worsening of condition or death during transfer   Accepting Physician Dr. Brian TRUONG   Accepting Facility Name[facility]   Sending MD Ariel Bergman MD   Provider Certification General risk, such as traffic hazards, adverse weather conditions, rough terrain or turbulence, possible failure of equipment (including vehicle or aircraft), or consequences of actions of persons outside the control of the transport personnel, Unanticipated needs of medical equipment and personnel during transport, Risk of worsening condition, The possibility of a transport vehicle being unavailable          RN Documentation      Flowsheet Row Most Recent Value   Accepting Facility Name, [facility] [place]          Follow-up Information    None         Patient's Medications   Discharge Prescriptions    No medications on file       No discharge procedures on file.    PDMP Review       None            ED Provider  Electronically Signed by             Ariel Bergman MD  08/11/24 4647

## 2024-08-11 NOTE — DISCHARGE INSTR - AVS FIRST PAGE
Expect to see extra bruising, and some swelling.  Remove the dressing by unwrapping the Coban dressing carefully tomorrow.  After that you may shower.  The stitches will dissolve on their own.  For fever greater 101.5 degrees, severe pain not relieved by over-the-counter or narcotic pain medications, or inability urinate.  No heavy lifting for greater than 15 pounds for 2 weeks.  Apply bacitracin or Neosporin or what ever over-the-counter triple antibiotic that they have to the wound where the stitches are to keep them soft twice a day until they dissolve.

## 2024-08-11 NOTE — ED NOTES
Pt will ring call bell when able to provide urine sample. Call bell within reach.     Pratima Green RN  08/11/24 8051

## 2024-08-11 NOTE — OP NOTE
OPERATIVE REPORT  PATIENT NAME: Chavo Reyes    :  1950  MRN: [id number]  Pt Location: [facility] OR ROOM 08    SURGERY DATE: 2024    Surgeons and Role:     * Johnny Mann MD - Primary    Preop Diagnosis:  Rupture of corpus cavernosum of penis, initial encounter [S39.840A]    Post-Op Diagnosis Codes:     * Rupture of corpus cavernosum of penis, initial encounter [S39.840A]    Procedure(s):  REPAIR PENILE FRACTURE/RUPTURE    Specimen(s):  * No specimens in log *    Estimated Blood Loss:   Minimal    Drains:  * No LDAs found *    Anesthesia Type:   General    Operative Indications:  Rupture of corpus cavernosum of penis, initial encounter [S39.840A]  As above    Operative Findings:  0.5 to 1 cm rent left ventral penis near the base just adjacent to the urethra.  Oversewed with 3-0 Monocryl, bleeding stopped with this.  Coban dressing can come off tomorrow.      Complications:   None    Procedure and Technique:  73-year-old man presented in transfer from [facility] emergency department for management of presumed penile fracture.  He was having intercourse at 9 PM last night and felt some pain in the penis.  He has baseline erectile dysfunction with need for PDE 5 inhibitors but he is able to perform-he finished intercourse, ejaculated then he urinated without a problem.  He noted penile swelling with bruising.  He therefore presented to the emergency department at 2 AM.  We were notified at approximately 4 AM and told of the situation.  Because I could not evaluate him personally at [facility] he was transferred to [facility] for further evaluation and possible surgical management.  He arrived and he has dark ecchymosis of the penile shaft which extends to the infrapubic region and a mild amount down to the scrotum.  Some penile swelling ventrally, normal glans, no lesions no sign of infection in the scrotum is otherwise unremarkable, no testicular or scrotal swelling.  Because  of the history of possible penile fracture and the existence of a rent in the beginning of the corpus cavernosum, I thought would be best to proceed with giving him anesthesia, penile exploration with degloving of the penis and fixing any fractures.  The alternative would be to simply observe but if the clot comes off and he bleeds again he will simply be representing.  Also not fixing any fracture could result in fibrosis with bending of the penis.  He agrees and wishes to proceed with the procedure.  The risks of bleeding infection, damage to the penis, penile bending, loss penile sensation and worsened impotence were explained and he gives informed consent.    The patient was brought to the operating room and identified properly.  LMA was induced the patient was prepped and draped in supine position usual fashion.  A timeout was performed.  I placed hemostats in 4 quadrants around the penile shaft skin to provide retraction, then demarcated approximately 2 to 3 mm from the coronal sulcus to the shaft skin.  Using a 15 blade I incised through the shaft skin to come to the dartos tissue underneath.  Using tenotomy scissors, and Bovie electrocautery I dissected through this to degloved the penis.  Degloved it all the way to the base near the scrotal skin.  I found an area of clot in the left ventral corpus cavernosum near the base of the penis about 1/2 to 1 cm long, vertically.  This was bleeding and had clot on top of it and this was the only fracture I could find.  I therefore cleaned this off, use a lot of irrigation during the whole procedure and I closed this with figure-of-eight sutures of 3-0 Monocryl.  This stopped the bleeding and closing up nicely.  No urethral involvement.  Care was taken not to go too deep that could involve the spongiosis or the urethra.  Once this was closed up I irrigated and checked for any other fractures and I could find none, so I approximated the penile skin with anchoring 3-0  Monocryl sutures ventrally and dorsally to the coronal sulcus, then I used a 3-0 Chromic Gut to approximate with a running simple suture both sides between the anchors.  I then cut these and I placed Coban around the penis for a compression dressing.  All sponge and needle counts were correct at the end of the procedure.   I was present for the entire procedure. and A qualified resident physician was not available.    Patient Disposition:  PACU  and extubated and stable        SIGNATURE: Johnny Mann MD  DATE: August 11, 2024  TIME: 10:45 AM

## 2024-08-11 NOTE — ED NOTES
[facility] here to pick patient up at this time. All necessary paperwork and all patient belongings given to EMS.      Colleen Mccarty RN  08/11/24 0987

## 2024-08-11 NOTE — H&P
[facility]  H&P  Name: Chavo Reyes 73 y.o. male I MRN: [id number]  Unit/Bed#: OR POOL I Date of Admission: 8/11/2024   Date of Service: 8/11/2024 I Hospital Day: 0      Assessment & Plan   * Rupture of corpus cavernosum of penis  Assessment & Plan  Patient was transferred to [facility] from [facility] due to concern for pain and fracture.    Urology took the patient to the OR for repair of the Cason fracture  Tolerated procedure well  Cleared from urology standpoint for discharge  Pressure dressing can come off tomorrow  We will monitor overnight in the hospital.  IV antibiotics  Pain control    GERD (gastroesophageal reflux disease)  Assessment & Plan  Omeprazole auto substituted to Protonix.    Hypertension  Assessment & Plan  Patient is on amlodipine and metoprolol as outpatient continue with outpatient regimen and monitor blood pressure    TIA (transient ischemic attack)  Assessment & Plan  Previously history of TIA.  On aspirin and statin which we will continue           VTE Pharmacologic Prophylaxis: VTE Score: 4   Code Status:  full code  Discussion with family: Patient declined call to .     Anticipated Length of Stay: Patient will be admitted on an observation basis with an anticipated length of stay of less than 2 midnights secondary to penis fracture.    Total Time Spent on Date of Encounter in care of patient: 65 mins. This time was spent on one or more of the following: performing physical exam; counseling and coordination of care; obtaining or reviewing history; documenting in the medical record; reviewing/ordering tests, medications or procedures; communicating with other healthcare professionals and discussing with patient's family/caregivers.    Chief Complaint: Pain and swelling of the penis    History of Present Illness:  Chavo Reyes is a 73 y.o. male with a PMH of hypertension, hyperlipidemia, TIA, chronic neck pain, erectile  dysfunction who presents as a transfer from [facility] directly to the OR.  Patient initially presented to the emergency room with pain and swelling of his penis.  It appears that the patient was having sex and the penis bent backward with development of symptoms.  Patient was seen in the emergency room on early morning hours due to persistent pain and swelling and associated bruising.  This was discussed with urology who recommended transfer to [facility].  Patient was directly taken to the OR upon arrival to [facility].  Had repair of the peroneal fracture/rupture of the corpus cavernosum.  Pressure dressing applied by urology and actually cleared from urology extending for discharge.  Unfortunately patient does not have a ride or somebody to stay with him overnight so we will monitor the patient overnight in the hospital and likely discharge tomorrow  .  Review of Systems:  Review of Systems   Constitutional: Negative.    HENT: Negative.     Eyes: Negative.    Respiratory: Negative.     Cardiovascular: Negative.    Gastrointestinal: Negative.    Endocrine: Negative.    Genitourinary:  Positive for penile pain, penile swelling and scrotal swelling.        Bruicing of the penis and scrotum   Musculoskeletal: Negative.    Skin: Negative.    Allergic/Immunologic: Negative.    Neurological: Negative.    Hematological: Negative.    Psychiatric/Behavioral: Negative.         Past Medical and Surgical History:   Past Medical History:   Diagnosis Date    BPH (benign prostatic hyperplasia)     Family history of malignant hyperthermia     GERD (gastroesophageal reflux disease)     Heart problem     Hyperlipidemia     Hypertension     Neuropathy     Seasonal allergies     TIA (transient ischemic attack)        Past Surgical History:   Procedure Laterality Date    APPENDECTOMY      CAROTID ARTERY ANGIOPLASTY      CARPAL TUNNEL RELEASE      COLONOSCOPY      DC LAPAROSCOPY SURG RPR INITIAL  INGUINAL HERNIA N/A 5/23/2023    Procedure: RIGHT ROBOTIC ASSISTED LAPAROSCOPIC INGUINAL HERNIA REPAIR, POSSIBLE LEFT;  Surgeon: Orquidea Fritz DO;  Location: [facility] MAIN OR;  Service: General       Meds/Allergies:  Prior to Admission medications    Medication Sig Start Date End Date Taking? Authorizing Provider   cephalexin (KEFLEX) 500 mg capsule Take 1 capsule (500 mg total) by mouth every 6 (six) hours for 12 doses 8/11/24 8/14/24 Yes Johnny Mann MD   HYDROcodone-acetaminophen (NORCO) 5-325 mg per tablet Take 1-2 tablets by mouth every 6 (six) hours as needed for pain for up to 5 doses Max Daily Amount: 8 tablets 8/11/24  Yes Johnny Mann MD   albuterol (PROVENTIL HFA,VENTOLIN HFA) 90 mcg/act inhaler 2-3 inhalations every 3-4 hours for 3 days and then as needed for cough, wheezing 6/21/22   Josue Olivier DO   amLODIPine (NORVASC) 5 mg tablet 5 mg daily 3/28/22   Historical Provider, MD   Ascorbic Acid (vitamin C) 100 MG tablet Take 100 mg by mouth daily    Historical Provider, MD   aspirin 325 mg tablet Take 325 mg by mouth daily    Historical Provider, MD   atorvastatin (LIPITOR) 40 mg tablet Take by mouth daily 4/27/22   Historical Provider, MD   B Complex Vitamins (Vitamin B Complex) TABS Take 1 tablet by mouth in the morning.    Historical Provider, MD   dicyclomine (BENTYL) 20 mg tablet Take 1 tablet (20 mg total) by mouth 2 (two) times a day 12/31/22   Silvina Navarro DO   doxazosin (CARDURA) 4 mg tablet daily at bedtime 3/28/22   Historical Provider, MD   gabapentin (NEURONTIN) 600 MG tablet 600 mg 2 (two) times a day 4/27/22   Historical Provider, MD   loratadine (CLARITIN) 10 mg tablet  4/27/22   Historical Provider, MD   metoprolol succinate (TOPROL-XL) 50 mg 24 hr tablet daily 3/28/22   Historical Provider, MD   Multiple Vitamins-Iron TABS Take by mouth    Historical Provider, MD   nitroglycerin (NITROSTAT) 0.4 mg SL tablet     Historical Provider, MD   Olopatadine HCl (Pataday) 0.7 % SOLN  Apply to eye    Historical Provider, MD   omeprazole (PriLOSEC) 40 MG capsule daily 22   Historical Provider, MD   ondansetron (Zofran ODT) 4 mg disintegrating tablet Take 1 tablet (4 mg total) by mouth every 6 (six) hours as needed for nausea or vomiting 22   Silvina Navarro DO   Potassium 99 MG TABS Take 99 mg by mouth in the morning.    Historical Provider, MD   simvastatin (ZOCOR) 20 mg tablet Take 20 mg by mouth    Historical Provider, MD   SUMAtriptan (IMITREX) 100 mg tablet  22   Historical Provider, MD   UNKNOWN TO PATIENT     Historical Provider, MD     I have reviewed home medications with patient personally.    Allergies:   Allergies   Allergen Reactions    Ibuprofen Anaphylaxis       Social History:  Marital Status:    Occupation:   Patient Pre-hospital Living Situation: Home  Patient Pre-hospital Level of Mobility: walks  Patient Pre-hospital Diet Restrictions:   Substance Use History:   Social History     Substance and Sexual Activity   Alcohol Use Not Currently     Social History     Tobacco Use   Smoking Status Former    Current packs/day: 0.00    Types: Cigarettes    Quit date:     Years since quittin.6   Smokeless Tobacco Never     Social History     Substance and Sexual Activity   Drug Use Never       Family History:  History reviewed. No pertinent family history.    Physical Exam:     Vitals:   Blood Pressure: 146/74 (24 1400)  Pulse: 59 (24 1400)  Temperature: (!) 97.4 °F (36.3 °C) (24 1223)  Temp Source: Temporal (24 1329)  Respirations: 18 (24 1400)  SpO2: 91 % (24 1400)    Physical Exam  Constitutional:       General: He is not in acute distress.  HENT:      Head: Normocephalic and atraumatic.      Nose: Nose normal.   Eyes:      Conjunctiva/sclera: Conjunctivae normal.   Cardiovascular:      Rate and Rhythm: Normal rate and regular rhythm.      Heart sounds: No murmur heard.  Pulmonary:      Effort: Pulmonary effort is  normal. No respiratory distress.   Abdominal:      General: Bowel sounds are normal. There is no distension.   Genitourinary:     Comments: Penis covered in pressure dressing.  Ecchymosis of the penis and scrotum noted.  Scrotal edema noted  Musculoskeletal:         General: Normal range of motion.   Skin:     General: Skin is warm.   Neurological:      General: No focal deficit present.      Mental Status: He is alert. Mental status is at baseline.          Additional Data:     Lab Results:  Results from last 7 days   Lab Units 08/11/24  0444   WBC Thousand/uL 7.26   HEMOGLOBIN g/dL 12.1   HEMATOCRIT % 35.0*   PLATELETS Thousands/uL 199   SEGS PCT % 58   LYMPHO PCT % 21   MONO PCT % 16*   EOS PCT % 4     Results from last 7 days   Lab Units 08/11/24  0444   SODIUM mmol/L 133*   POTASSIUM mmol/L 3.8   CHLORIDE mmol/L 100   CO2 mmol/L 26   BUN mg/dL 17   CREATININE mg/dL 1.07   ANION GAP mmol/L 7   CALCIUM mg/dL 9.4   ALBUMIN g/dL 4.4   TOTAL BILIRUBIN mg/dL 0.39   ALK PHOS U/L 64   ALT U/L 16   AST U/L 27   GLUCOSE RANDOM mg/dL 100             Lab Results   Component Value Date    HGBA1C 5.5 04/20/2023    HGBA1C 5.4 04/19/2023    HGBA1C 5.7 (H) 04/11/2022           Lines/Drains:  Invasive Devices       Peripheral Intravenous Line  Duration             Peripheral IV 08/11/24 Dorsal (posterior);Left Forearm <1 day                        Imaging: No pertinent imaging reviewed.  No orders to display       EKG and Other Studies Reviewed on Admission:   EKG: No EKG obtained.    ** Please Note: This note has been constructed using a voice recognition system. **

## 2024-08-11 NOTE — ANESTHESIA PREPROCEDURE EVALUATION
Procedure:  REPAIR PENILE FRACTURE/RUPTURE (Penis)    Recent URI with residual symptoms    Metoprolol held this AM    Relevant Problems   CARDIO   (+) Hypertension      GI/HEPATIC   (+) GERD (gastroesophageal reflux disease)      NEURO/PSYCH   (+) TIA (transient ischemic attack)      Surgery/Wound/Pain   (+) Rupture of corpus cavernosum of penis        Physical Exam    Airway    Mallampati score: III  TM Distance: >3 FB  Neck ROM: full     Dental    upper dentures    Cardiovascular  Rhythm: regular, Rate: normal, Cardiovascular exam normal    Pulmonary  Pulmonary exam normal Breath sounds clear to auscultation    Other Findings  Few remaining teeth bottom      Anesthesia Plan  ASA Score- 3     Anesthesia Type- general with ASA Monitors.         Additional Monitors:     Airway Plan: ETT.    Comment: Risks of general anesthesia discussed including likely possibility of PONV and sore throat, as well as the rare possibilities of aspiration, dental/oropharyngeal/ocular injuries, or grave/life threatening anesthetic and surgical emergencies..       Plan Factors-Exercise tolerance (METS): >4 METS.    Chart reviewed.    Patient summary reviewed.      Patient instructed to abstain from smoking on day of procedure. Patient did not smoke on day of surgery.            Induction- intravenous.    Postoperative Plan- Plan for postoperative opioid use. Planned trial extubation        Informed Consent- Anesthetic plan and risks discussed with patient.  I personally reviewed this patient with the CRNA. Discussed and agreed on the Anesthesia Plan with the CRNA..

## 2024-08-11 NOTE — PLAN OF CARE
Problem: PAIN - ADULT  Goal: Verbalizes/displays adequate comfort level or baseline comfort level  Description: Interventions:  - Encourage patient to monitor pain and request assistance  - Assess pain using appropriate pain scale  - Administer analgesics based on type and severity of pain and evaluate response  - Implement non-pharmacological measures as appropriate and evaluate response  - Consider cultural and social influences on pain and pain management  - Notify physician/advanced practitioner if interventions unsuccessful or patient reports new pain  Outcome: Progressing     Problem: INFECTION - ADULT  Goal: Absence or prevention of progression during hospitalization  Description: INTERVENTIONS:  - Assess and monitor for signs and symptoms of infection  - Monitor lab/diagnostic results  - Monitor all insertion sites, i.e. indwelling lines, tubes, and drains  - Monitor endotracheal if appropriate and nasal secretions for changes in amount and color  - [place] appropriate cooling/warming therapies per order  - Administer medications as ordered  - Instruct and encourage patient and family to use good hand hygiene technique  - Identify and instruct in appropriate isolation precautions for identified infection/condition  Outcome: Progressing     Problem: SAFETY ADULT  Goal: Patient will remain free of falls  Description: INTERVENTIONS:  - Educate patient/family on patient safety including physical limitations  - Instruct patient to call for assistance with activity   - Consult OT/PT to assist with strengthening/mobility   - Keep Call bell within reach  - Keep bed low and locked with side rails adjusted as appropriate  - Keep care items and personal belongings within reach  - Initiate and maintain comfort rounds  - Make Fall Risk Sign visible to staff  - Apply yellow socks and bracelet for high fall risk patients  - Consider moving patient to room near nurses station  Outcome: Progressing     Problem: DISCHARGE  PLANNING  Goal: Discharge to home or other facility with appropriate resources  Description: INTERVENTIONS:  - Identify barriers to discharge w/patient and caregiver  - Arrange for needed discharge resources and transportation as appropriate  - Identify discharge learning needs (meds, wound care, etc.)  - Arrange for interpretive services to assist at discharge as needed  - Refer to Case Management Department for coordinating discharge planning if the patient needs post-hospital services based on physician/advanced practitioner order or complex needs related to functional status, cognitive ability, or social support system  Outcome: Progressing     Problem: Knowledge Deficit  Goal: Patient/family/caregiver demonstrates understanding of disease process, treatment plan, medications, and discharge instructions  Description: Complete learning assessment and assess knowledge base.  Interventions:  - Provide teaching at level of understanding  - Provide teaching via preferred learning methods  Outcome: Progressing     Problem: GENITOURINARY - ADULT  Goal: Maintains or returns to baseline urinary function  Description: INTERVENTIONS:  - Assess urinary function  - Encourage oral fluids to ensure adequate hydration if ordered  - Administer IV fluids as ordered to ensure adequate hydration  - Administer ordered medications as needed  - Offer frequent toileting  - Follow urinary retention protocol if ordered  Outcome: Progressing  Goal: Absence of urinary retention  Description: INTERVENTIONS:  - Assess patient’s ability to void and empty bladder  - Monitor I/O  - Bladder scan as needed  - Discuss with physician/AP medications to alleviate retention as needed  - Discuss catheterization for long term situations as appropriate  Outcome: Progressing     Problem: METABOLIC, FLUID AND ELECTROLYTES - ADULT  Goal: Electrolytes maintained within normal limits  Description: INTERVENTIONS:  - Monitor labs and assess patient for signs  and symptoms of electrolyte imbalances  - Administer electrolyte replacement as ordered  - Monitor response to electrolyte replacements, including repeat lab results as appropriate  - Instruct patient on fluid and nutrition as appropriate  Outcome: Progressing  Goal: Fluid balance maintained  Description: INTERVENTIONS:  - Monitor labs   - Monitor I/O and WT  - Instruct patient on fluid and nutrition as appropriate  - Assess for signs & symptoms of volume excess or deficit  Outcome: Progressing     Problem: SKIN/TISSUE INTEGRITY - ADULT  Goal: Incision(s), wounds(s) or drain site(s) healing without S/S of infection  Description: INTERVENTIONS  - Assess and document dressing, incision, wound bed, drain sites and surrounding tissue  - Provide patient and family education  Outcome: Progressing     Problem: HEMATOLOGIC - ADULT  Goal: Maintains hematologic stability  Description: INTERVENTIONS  - Assess for signs and symptoms of bleeding or hemorrhage  - Monitor labs  - Administer supportive blood products/factors as ordered and appropriate  Outcome: Progressing

## 2024-08-11 NOTE — H&P
H&P Exam - Urology   Chavo Reyes 1950 [id number]      Assessment/Plan     Assessment:  Possible penile fracture, with ecchymosis and swelling.  Plan:  Transfer from [facility] for evaluation-most likely has penile fracture, take to the operating room, deglove the penis fix the fracture.    History of Present Illness   HPI:  The patient presents for evaluation of possible penile fracture from [facility] and transferred to [place].  At 9:00 last night the patient was having intercourse and he felt some pain-he went on to ejaculate and then urinated without any problem no blood and he noticed penile swelling.  At around 2 AM he presented to the emergency department for evaluation.  The emergency department will called us and he was sent to me here at [place] for further evaluation and possible surgical treatment.  On upon evaluation he does have ecchymosis primarily of the ventral portion with edema, swelling of the dorsal portion extension into the infrapubic region of the ecchymosis.  There is also some scrotal ecchymosis.  No tenderness, no sign of infection.  He already has impotence but is able to perform sexually with the help of PDE 5 inhibitors.  No baseline voiding problems.  Has never been to a urologist before.  The procedure of degloving the penis and fixing the rent were explained and he gives informed consent.. The risks of bleeding, infection, loss of penile sensation, worsened impotence and need for additional procedures has been explained and informed consent given.    Past Medical History:   Diagnosis Date    BPH (benign prostatic hyperplasia)     Family history of malignant hyperthermia     GERD (gastroesophageal reflux disease)     Heart problem     Hyperlipidemia     Hypertension     Neuropathy     Seasonal allergies     TIA (transient ischemic attack)        Past Surgical History:   Procedure Laterality Date    APPENDECTOMY      CAROTID ARTERY ANGIOPLASTY       CARPAL TUNNEL RELEASE      COLONOSCOPY      IN LAPAROSCOPY SURG RPR INITIAL INGUINAL HERNIA N/A 2023    Procedure: RIGHT ROBOTIC ASSISTED LAPAROSCOPIC INGUINAL HERNIA REPAIR, POSSIBLE LEFT;  Surgeon: Orquidea Fritz DO;  Location: [facility];  Service: General           Review of systems:    General: No fever.  CVS: No chest pain or new SOB.  Abdomen: No diarrhea or blood in stool.  : No new voiding difficulties.  Neuro: No syncope/weakness/loss of sensation/paresis  Ophthalmologic: No new visual changes.  Ortho: No new back/joint pains.    Social History- as per previous notes.        Family History: non-contributory    Meds/Allergies   PTA meds:   Prior to Admission Medications   Prescriptions Last Dose Informant Patient Reported? Taking?   Ascorbic Acid (vitamin C) 100 MG tablet   Yes No   Sig: Take 100 mg by mouth daily   B Complex Vitamins (Vitamin B Complex) TABS   Yes No   Sig: Take 1 tablet by mouth in the morning.   Multiple Vitamins-Iron TABS   Yes No   Sig: Take by mouth   Olopatadine HCl (Pataday) 0.7 % SOLN   Yes No   Sig: Apply to eye   Potassium 99 MG TABS   Yes No   Sig: Take 99 mg by mouth in the morning.   SUMAtriptan (IMITREX) 100 mg tablet   Yes No   UNKNOWN TO PATIENT   Yes No   albuterol (PROVENTIL HFA,VENTOLIN HFA) 90 mcg/act inhaler   No No   Si-3 inhalations every 3-4 hours for 3 days and then as needed for cough, wheezing   amLODIPine (NORVASC) 5 mg tablet   Yes No   Si mg daily   aspirin 325 mg tablet   Yes No   Sig: Take 325 mg by mouth daily   atorvastatin (LIPITOR) 40 mg tablet   Yes No   Sig: Take by mouth daily   dicyclomine (BENTYL) 20 mg tablet   No No   Sig: Take 1 tablet (20 mg total) by mouth 2 (two) times a day   doxazosin (CARDURA) 4 mg tablet   Yes No   Sig: daily at bedtime   gabapentin (NEURONTIN) 600 MG tablet   Yes No   Si mg 2 (two) times a day   loratadine (CLARITIN) 10 mg tablet   Yes No   metoprolol succinate (TOPROL-XL) 50 mg 24 hr  tablet   Yes No   Sig: daily   nitroglycerin (NITROSTAT) 0.4 mg SL tablet   Yes No   omeprazole (PriLOSEC) 40 MG capsule   Yes No   Sig: daily   ondansetron (Zofran ODT) 4 mg disintegrating tablet   No No   Sig: Take 1 tablet (4 mg total) by mouth every 6 (six) hours as needed for nausea or vomiting   simvastatin (ZOCOR) 20 mg tablet   Yes No   Sig: Take 20 mg by mouth      Facility-Administered Medications: None         Objective   Vitals: There were no vitals taken for this visit.    No intake/output data recorded.          Physical Exam:    Awake, alert, in NAD.    HEENT: Atraumatic, Normocephalic    Lungs: Normal Respiratory effort, no wheezes,stridor or rales.    CVS- RRR    Abdomen: Nondistended.    Extremiites: Normal ROM, no cyanosis/edema.      Lab Results: I have personally reviewed pertinent reports.    Imaging: I have personally reviewed pertinent reports.

## 2024-08-12 ENCOUNTER — TELEPHONE (OUTPATIENT)
Dept: OTHER | Facility: HOSPITAL | Age: 74
End: 2024-08-12

## 2024-08-12 VITALS
RESPIRATION RATE: 16 BRPM | BODY MASS INDEX: 27.06 KG/M2 | TEMPERATURE: 98.5 F | SYSTOLIC BLOOD PRESSURE: 140 MMHG | OXYGEN SATURATION: 97 % | HEIGHT: 70 IN | WEIGHT: 189 LBS | DIASTOLIC BLOOD PRESSURE: 73 MMHG | HEART RATE: 62 BPM

## 2024-08-12 LAB
ANION GAP SERPL CALCULATED.3IONS-SCNC: 8 MMOL/L (ref 4–13)
BUN SERPL-MCNC: 15 MG/DL (ref 5–25)
CALCIUM SERPL-MCNC: 9 MG/DL (ref 8.4–10.2)
CHLORIDE SERPL-SCNC: 103 MMOL/L (ref 96–108)
CO2 SERPL-SCNC: 24 MMOL/L (ref 21–32)
CREAT SERPL-MCNC: 0.92 MG/DL (ref 0.6–1.3)
ERYTHROCYTE [DISTWIDTH] IN BLOOD BY AUTOMATED COUNT: 13.2 % (ref 11.6–15.1)
GFR SERPL CREATININE-BSD FRML MDRD: 82 ML/MIN/1.73SQ M
GLUCOSE P FAST SERPL-MCNC: 120 MG/DL (ref 65–99)
GLUCOSE SERPL-MCNC: 120 MG/DL (ref 65–140)
HCT VFR BLD AUTO: 38.3 % (ref 36.5–49.3)
HGB BLD-MCNC: 12.6 G/DL (ref 12–17)
MCH RBC QN AUTO: 31.3 PG (ref 26.8–34.3)
MCHC RBC AUTO-ENTMCNC: 32.9 G/DL (ref 31.4–37.4)
MCV RBC AUTO: 95 FL (ref 82–98)
PLATELET # BLD AUTO: 228 THOUSANDS/UL (ref 149–390)
PMV BLD AUTO: 8.9 FL (ref 8.9–12.7)
POTASSIUM SERPL-SCNC: 4.2 MMOL/L (ref 3.5–5.3)
RBC # BLD AUTO: 4.03 MILLION/UL (ref 3.88–5.62)
SODIUM SERPL-SCNC: 135 MMOL/L (ref 135–147)
WBC # BLD AUTO: 15.23 THOUSAND/UL (ref 4.31–10.16)

## 2024-08-12 PROCEDURE — 99239 HOSP IP/OBS DSCHRG MGMT >30: CPT | Performed by: INTERNAL MEDICINE

## 2024-08-12 PROCEDURE — 80048 BASIC METABOLIC PNL TOTAL CA: CPT | Performed by: FAMILY MEDICINE

## 2024-08-12 PROCEDURE — 85027 COMPLETE CBC AUTOMATED: CPT | Performed by: FAMILY MEDICINE

## 2024-08-12 PROCEDURE — 99024 POSTOP FOLLOW-UP VISIT: CPT | Performed by: NURSE PRACTITIONER

## 2024-08-12 RX ORDER — CEPHALEXIN 500 MG/1
500 CAPSULE ORAL EVERY 6 HOURS SCHEDULED
Status: DISCONTINUED | OUTPATIENT
Start: 2024-08-12 | End: 2024-08-12 | Stop reason: HOSPADM

## 2024-08-12 RX ORDER — BACITRACIN, NEOMYCIN, POLYMYXIN B 400; 3.5; 5 [USP'U]/G; MG/G; [USP'U]/G
1 OINTMENT TOPICAL 2 TIMES DAILY
Status: DISCONTINUED | OUTPATIENT
Start: 2024-08-12 | End: 2024-08-12 | Stop reason: HOSPADM

## 2024-08-12 RX ADMIN — CEFAZOLIN SODIUM 1000 MG: 1 SOLUTION INTRAVENOUS at 03:10

## 2024-08-12 RX ADMIN — METOPROLOL SUCCINATE 50 MG: 50 TABLET, EXTENDED RELEASE ORAL at 08:04

## 2024-08-12 RX ADMIN — LORATADINE 10 MG: 10 TABLET ORAL at 08:04

## 2024-08-12 RX ADMIN — ASPIRIN 325 MG ORAL TABLET 325 MG: 325 PILL ORAL at 08:05

## 2024-08-12 RX ADMIN — BACITRACIN ZINC, NEOMYCIN, POLYMYXIN B SULFAT 1 SMALL APPLICATION: 5000; 3.5; 4 OINTMENT TOPICAL at 11:18

## 2024-08-12 RX ADMIN — DOCUSATE SODIUM 100 MG: 100 CAPSULE, LIQUID FILLED ORAL at 08:05

## 2024-08-12 RX ADMIN — DOXAZOSIN 4 MG: 4 TABLET ORAL at 08:05

## 2024-08-12 RX ADMIN — PANTOPRAZOLE SODIUM 40 MG: 40 TABLET, DELAYED RELEASE ORAL at 05:44

## 2024-08-12 RX ADMIN — ENOXAPARIN SODIUM 40 MG: 40 INJECTION SUBCUTANEOUS at 08:04

## 2024-08-12 RX ADMIN — SENNOSIDES 8.6 MG: 8.6 TABLET, FILM COATED ORAL at 08:05

## 2024-08-12 RX ADMIN — CEPHALEXIN 500 MG: 500 CAPSULE ORAL at 11:18

## 2024-08-12 RX ADMIN — AMLODIPINE BESYLATE 5 MG: 5 TABLET ORAL at 08:05

## 2024-08-12 RX ADMIN — GABAPENTIN 600 MG: 300 CAPSULE ORAL at 08:05

## 2024-08-12 NOTE — DISCHARGE SUMMARY
"[facility]  Discharge- Chavo Reyes 1950, 73 y.o. male MRN: [id number]  [facility] Encounter: [id number]  Primary Care Provider: Jurgen Hernandez MD   Date and time admitted to hospital: 8/11/2024 10:18 AM    GERD (gastroesophageal reflux disease)  Assessment & Plan  ppi    Hypertension  Assessment & Plan  Patient is on amlodipine and metoprolol as outpatient continue with outpatient regimen and follow up with pcp    * Rupture of corpus cavernosum of penis  Assessment & Plan  Patient was transferred to [facility] from [facility] due to concern for pain and fracture.    Urology took the patient to the OR for repair of the Cason fracture  Tolerated procedure well  Cleared from urology standpoint for discharge  Urology sent script of keflex               Medical Problems       Resolved Problems  Date Reviewed: 8/12/2024   None         Admission Date:   Admission Orders (From admission, onward)       Ordered        08/11/24 1449  Place in Observation  Once                            Admitting Diagnosis: Rupture of corpus cavernosum of penis, initial encounter [S39.840A]    HPI: per admitting MD \"Chavo Reyes is a 73 y.o. male with a PMH of hypertension, hyperlipidemia, TIA, chronic neck pain, erectile dysfunction who presents as a transfer from [facility] directly to the OR.  Patient initially presented to the emergency room with pain and swelling of his penis.  It appears that the patient was having sex and the penis bent backward with development of symptoms.  Patient was seen in the emergency room on early morning hours due to persistent pain and swelling and associated bruising.  This was discussed with urology who recommended transfer to [facility].  Patient was directly taken to the OR upon arrival to [facility].  Had repair of the peroneal fracture/rupture of the corpus cavernosum.  Pressure dressing applied " "by urology \"    Procedures Performed: No orders of the defined types were placed in this encounter.      Summary of Hospital Course:   Patient was transferred to [facility] from [facility] due to concern for rupture of corpus cavernous of penis  Urology took the patient to the OR for repair of the Cason fracture  Tolerated procedure well  Cleared from urology standpoint for discharge  Urology sent script of keflex       Complications: none    Condition at Discharge: good         Discharge instructions/Information to patient and family:   See after visit summary for information provided to patient and family.      Provisions for Follow-Up Care:  See after visit summary for information related to follow-up care and any pertinent home health orders.      PCP: Jurgen Hernandez MD    Disposition: Home    Planned Readmission: No    Discharge Statement   I spent 31 minutes discharging the patient. This time was spent on the day of discharge. I had direct contact with the patient on the day of discharge. Additional documentation is required if more than 30 minutes were spent on discharge.     Discharge Medications:  See after visit summary for reconciled discharge medications provided to patient and family.                   "

## 2024-08-12 NOTE — ASSESSMENT & PLAN NOTE
Patient is on amlodipine and metoprolol as outpatient continue with outpatient regimen and follow up with pcp

## 2024-08-12 NOTE — ASSESSMENT & PLAN NOTE
Patient was transferred to [facility] from [facility] due to concern for pain and fracture.    Urology took the patient to the OR for repair of the Cason fracture  Tolerated procedure well  Cleared from urology standpoint for discharge  Urology sent script of keflex

## 2024-08-12 NOTE — CASE MANAGEMENT
Case Management Discharge Planning Note    Patient name Chavo Reyes  Location [facility] 817/[facility] 817-01 MRN [id number]  : 1950 Date 2024       Current Admission Date: 2024  Current Admission Diagnosis:Rupture of corpus cavernosum of penis   Patient Active Problem List    Diagnosis Date Noted Date Diagnosed    Rupture of corpus cavernosum of penis 2024     TIA (transient ischemic attack)      Hypertension      GERD (gastroesophageal reflux disease)      Bilateral inguinal hernia, without obstruction or gangrene, not specified as recurrent 2023     Acute pain of right shoulder 2022     Rotator cuff dysfunction, right 2022       LOS (days): 1  Geometric Mean LOS (GMLOS) (days):   Days to GMLOS:     OBJECTIVE:  Risk of Unplanned Readmission Score: 6.12         Current admission status: Observation   Preferred Pharmacy:   [facility] [address] [address]  Phone: [phone] Fax: [phone]    [id number] [address] [address]  Phone: [phone] Fax: [phone]    Primary Care Provider: Jurgen Hernandez MD    Primary Insurance: [facility] REP  Secondary Insurance:     DISCHARGE DETAILS:               Other Referral/Resources/Interventions Provided:  Referral Comments: [facility] by [facility] pt is ready for dc. S/w pt & he needs a ride to his car at [facility] ER parking lot. Lester sesay signed & placed in medical record bin. Lester requested for lobby A & confirmed pt got a text with details. RN updated.

## 2024-08-12 NOTE — TELEPHONE ENCOUNTER
Patient seen in consultation for penile fracture.  Status post degloving and repair of penile fracture by Dr. Mann on 8/11.  Patient should have 2-week wound check in the office

## 2024-08-12 NOTE — PROGRESS NOTES
Progress Note - Urology  Chavo Reyes 1950, 73 y.o. male MRN: [id number]    Unit/Bed#: [facility] 817-01 Encounter: [id number]    * Rupture of corpus cavernosum of penis  Assessment & Plan  S/p Degloving of penis and repair of penile fracture 8/11  Expect to see extra bruising, and some swelling  Remove the dressing today    may shower  stitches will dissolve on their own  Notify urology for fever greater 101.5 degrees, severe pain not relieved by over-the-counter or narcotic pain medications, or inability urinate  No heavy lifting for greater than 15 pounds for 2 weeks.    Apply triple antibiotic that they have to the wound where the stitches are to keep them soft twice a day until they dissolve  Discharge per primary team          Subjective: 73-year-old man presented in transfer from [facility] emergency department for management of presumed penile fracture.  Occurred after intercourse when he felt some pain in the penis. He has baseline erectile dysfunction with need for PDE 5 inhibitor. he finished intercourse, ejaculated then he urinated without a problem. He noted penile swelling with bruising.  No difficulty with urination.  He was transferred to [facility] for further evaluation and possible surgical management. He was noted to have dark ecchymosis of the penile shaft which extends to the infrapubic region and a mild amount down to the scrotum. Some penile swelling ventrally, normal glans, no lesions no sign of infection in the scrotum is otherwise unremarkable, no testicular or scrotal swelling.  He therefore underwent degloving of the penis and repair of the penile fracture on 8/11 with Dr. Mann.  He complains of mild spraying of his urine to the left in addition to his straight urine stream.  Tip of penis appears to be slightly swollen from compression dressing which was removed.  Patient also reports having an erection x 2 overnight.    24 HR EVENTS:   no significant events.   "    Patient has  complaints of mild spraying of urine to the left in addition to his straight urine stream.  We discussed that this is likely secondary to swelling of the glans penis secondary to compression dressing as well as inflammation from procedure.       Review of Systems   Constitutional:  Negative for activity change, appetite change, chills, fatigue, fever and unexpected weight change.   HENT:  Negative for facial swelling.    Eyes:  Negative for discharge.   Respiratory: Negative.  Negative for cough and shortness of breath.    Cardiovascular:  Negative for chest pain and leg swelling.   Gastrointestinal: Negative.  Negative for abdominal distention, abdominal pain, constipation, diarrhea, nausea and vomiting.   Endocrine: Negative.    Genitourinary:  Positive for penile swelling. Negative for decreased urine volume, difficulty urinating, dysuria, enuresis, flank pain, frequency, genital sores, hematuria, penile discharge, penile pain, scrotal swelling, testicular pain and urgency.        Mild spraying of urine to the left in addition to normal stream   Musculoskeletal:  Negative for back pain and myalgias.   Skin:  Negative for pallor and rash.   Allergic/Immunologic: Negative.  Negative for immunocompromised state.   Neurological:  Negative for facial asymmetry and speech difficulty.   Psychiatric/Behavioral:  Negative for agitation and confusion.        Objective:    Vitals: Blood pressure 140/73, pulse 62, temperature 98.5 °F (36.9 °C), resp. rate 16, height 5' 10\" (1.778 m), weight 85.7 kg (189 lb), SpO2 97%.,Body mass index is 27.12 kg/m².  INS & OUTS:  I/O last 24 hours:  In: 850 [P.O.:350; I.V.:500]  Out: 1365 [Urine:1365]    Physical Exam  Vitals and nursing note reviewed.   Constitutional:       General: He is not in acute distress.     Appearance: Normal appearance. He is not ill-appearing, toxic-appearing or diaphoretic.   HENT:      Head: Normocephalic.   Cardiovascular:      Rate and " Rhythm: Normal rate.   Pulmonary:      Effort: Pulmonary effort is normal. No respiratory distress.   Abdominal:      General: Abdomen is flat. There is no distension.   Genitourinary:     Comments: Coban dressing removed.  Mild swelling to tip of penis noted.  Surgical incisions with sutures clean dry and intact well-approximated without erythema.  Scant bloody drainage on old bandage  Musculoskeletal:         General: No swelling.   Skin:     General: Skin is warm and dry.   Neurological:      General: No focal deficit present.      Mental Status: He is alert and oriented to person, place, and time.   Psychiatric:         Mood and Affect: Mood normal.         Behavior: Behavior normal.         Thought Content: Thought content normal.         Judgment: Judgment normal.         Imaging:  None  Imaging reviewed - both report and images personally reviewed.     Labs:  Recent Labs     08/11/24  0444 08/12/24  0608   WBC 7.26 15.23*       Recent Labs     08/11/24  0444 08/12/24  0608   HGB 12.1 12.6     Recent Labs     08/11/24  0444 08/12/24  0608   HCT 35.0* 38.3     Recent Labs     08/11/24  0444 08/12/24  0608   CREATININE 1.07 0.92     Lab Results   Component Value Date    HGB 12.6 08/12/2024    HCT 38.3 08/12/2024    WBC 15.23 (H) 08/12/2024     08/12/2024     Lab Results   Component Value Date    K 4.2 08/12/2024     08/12/2024    CO2 24 08/12/2024    BUN 15 08/12/2024    CREATININE 0.92 08/12/2024    CALCIUM 9.0 08/12/2024       Urinalysis: negative for WBC's or RBC's    History:    Past Medical History:   Diagnosis Date    BPH (benign prostatic hyperplasia)     Family history of malignant hyperthermia     GERD (gastroesophageal reflux disease)     Heart problem     Hyperlipidemia     Hypertension     Neuropathy     Seasonal allergies     TIA (transient ischemic attack)      Past Surgical History:   Procedure Laterality Date    APPENDECTOMY      CAROTID ARTERY ANGIOPLASTY      CARPAL TUNNEL RELEASE       COLONOSCOPY      IN LAPAROSCOPY SURG RPR INITIAL INGUINAL HERNIA N/A 2023    Procedure: RIGHT ROBOTIC ASSISTED LAPAROSCOPIC INGUINAL HERNIA REPAIR, POSSIBLE LEFT;  Surgeon: Orquidea Fritz DO;  Location: [facility];  Service: General     History reviewed. No pertinent family history.  Social History     Socioeconomic History    Marital status:      Spouse name: None    Number of children: None    Years of education: None    Highest education level: None   Occupational History    None   Tobacco Use    Smoking status: Former     Current packs/day: 0.00     Types: Cigarettes     Quit date:      Years since quittin.6    Smokeless tobacco: Never   Vaping Use    Vaping status: Never Used   Substance and Sexual Activity    Alcohol use: Not Currently    Drug use: Never    Sexual activity: None   Other Topics Concern    None   Social History Narrative    None     Social Determinants of Health     Financial Resource Strain: Not on file   Food Insecurity: No Food Insecurity (2023)    Received from [facility]    Hunger Vital Sign     Worried About Running Out of Food in the Last Year: Never true     Ran Out of Food in the Last Year: Never true   Transportation Needs: Not on file   Physical Activity: Not on file   Stress: Not on file   Social Connections: Not on file   Intimate Partner Violence: Not on file   Housing Stability: Not on file       The following portions of the patient's history were reviewed and updated as appropriate: allergies, current medications, past family history, past medical history, past social history, past surgical history and problem list    EDDA Santillan  Date: 2024 Time: 10:12 AM

## 2024-08-12 NOTE — TELEPHONE ENCOUNTER
Patient remains admitted to [facility]. Continue to follow. Holding 8/28/2024 at 100 pm with Bob BAÑUELOS at the [facility].

## 2024-08-12 NOTE — ASSESSMENT & PLAN NOTE
S/p Degloving of penis and repair of penile fracture 8/11  Expect to see extra bruising, and some swelling  Remove the dressing today    may shower  stitches will dissolve on their own  Notify urology for fever greater 101.5 degrees, severe pain not relieved by over-the-counter or narcotic pain medications, or inability urinate  No heavy lifting for greater than 15 pounds for 2 weeks.    Apply triple antibiotic that they have to the wound where the stitches are to keep them soft twice a day until they dissolve  Discharge per primary team

## 2024-08-13 NOTE — TELEPHONE ENCOUNTER
"Attempted to contact patient to follow up with him s/p REPAIR PENILE FRACTURE/RUPTURE (Penis). Received message stating \"the wireless customer you are trying to reach is not available-please try again later\". Will need to confirm post op wound check appointment with EDDA Rojas in [place] on 8/28/24 @ 1:00 pm.   "

## 2024-08-28 ENCOUNTER — OFFICE VISIT (OUTPATIENT)
Dept: UROLOGY | Facility: CLINIC | Age: 74
End: 2024-08-28

## 2024-08-28 VITALS
TEMPERATURE: 97.9 F | BODY MASS INDEX: 27.69 KG/M2 | HEART RATE: 73 BPM | HEIGHT: 70 IN | WEIGHT: 193.4 LBS | DIASTOLIC BLOOD PRESSURE: 58 MMHG | SYSTOLIC BLOOD PRESSURE: 114 MMHG | OXYGEN SATURATION: 96 %

## 2024-08-28 DIAGNOSIS — S39.840D FRACTURE OF CORPUS CAVERNOSUM PENIS, SUBSEQUENT ENCOUNTER: Primary | ICD-10-CM

## 2024-08-28 PROCEDURE — 99024 POSTOP FOLLOW-UP VISIT: CPT

## 2024-08-28 RX ORDER — SILDENAFIL CITRATE 20 MG/1
100 TABLET ORAL
COMMUNITY
Start: 2024-08-01

## 2024-08-28 NOTE — PROGRESS NOTES
8/28/2024    No chief complaint on file.      Assessment and Plan    73 y.o. male manage by    Penile fracture  s/p degloving and repair of penile fracture by Dr. Mann on 8/11/2024.   Surgical incision is healing nicely. There is a scant amount of yellow slough noted to the dorsal glans.  Otherwise it is healing nicely.  There is still swelling noted to the meatus, he is experiencing some spraying of his stream to the right.  Postoperatively it was sprain to the left.  I suspect this may be due to the degree of swelling still present.  There is no bruising, voiding well and denies any pain.  He is able to obtain a spontaneous erection.  Continue with Neosporin twice daily, continue to avoid sexual activity.  Encouraged to use ice to reduce swelling.  I will see him in 4 weeks for follow-up.      Interval HPI:    He presents today reporting doing well following surgery.  He did slip and bumped into a table 2 days ago striking the penis.  He noted one of the sutures had opened.  On exam incision is healing nicely, there is no wound dehiscence.  There is a scant area of yellow slough noted to the dorsal aspect of the incision midline just behind the glans.  No erythema or drainage.  The meatus and glans are still swollen, he has been experiencing spraying of his urinary stream to the right which has changed when he was experiencing sprain to the left initially postoperatively.  He is voiding well without difficulties.  He denies any pain.  He is able to obtain a spontaneous erection.  No reports of curvature.  He reports that the bruising and swelling significantly improved 2 days ago.            History of Present Illness  Chavo Reyes is a 73 y.o. male here for post-op follow s/p degloving and repair of penile fracture by Dr. Mann on 8/11/2024.     Patient originally presented to [facility] emergency department due to pain in the penis following intercourse.  He was transferred to [facility] for management of presumed penile fracture. He has baseline erectile dysfunction with need for PDE 5 inhibitor. he finished intercourse, ejaculated then he urinated without a problem. He noted penile swelling with bruising.  No difficulty with urination. He was noted to have dark ecchymosis of the penile shaft which extends to the infrapubic region and a mild amount down to the scrotum. Some penile swelling ventrally, normal glans, no lesions no sign of infection in the scrotum is otherwise unremarkable, no testicular or scrotal swelling.  Postoperatively he did complain of mild spraying of his urine to the left in addition to his straight urine stream.  This was believed to be secondary to swelling of the glans of the penis due to a compression dressing as well as inflammation from procedure.            Review of Systems   Constitutional:  Negative for chills and fever.   HENT:  Negative for congestion and sore throat.    Respiratory:  Negative for cough and shortness of breath.    Cardiovascular:  Negative for chest pain and leg swelling.   Gastrointestinal:  Negative for abdominal pain, constipation and diarrhea.   Genitourinary:  Negative for difficulty urinating, dysuria, frequency, hematuria and urgency.   Musculoskeletal:  Negative for back pain and gait problem.   Skin:  Negative for wound.   Allergic/Immunologic: Negative for immunocompromised state.   Hematological:  Does not bruise/bleed easily.               Vitals  There were no vitals filed for this visit.    Physical Exam  Vitals reviewed.   Constitutional:       General: He is not in acute distress.     Appearance: Normal appearance. He is not ill-appearing or toxic-appearing.   HENT:      Head: Normocephalic and atraumatic.   Eyes:      General: No scleral icterus.     Conjunctiva/sclera: Conjunctivae normal.   Cardiovascular:      Rate and Rhythm: Normal rate.   Pulmonary:      Effort: Pulmonary effort is normal. No respiratory  distress.   Abdominal:      Tenderness: There is no right CVA tenderness or left CVA tenderness.      Hernia: No hernia is present.   Genitourinary:     Comments: incision is healing nicely, there is no wound dehiscence.  There is a scant area of yellow slough noted to the dorsal aspect of the incision midline just behind the glans.  No erythema or drainage.  The meatus and glans are still swollen.  Voiding well without difficulty otherwise.  Musculoskeletal:      Cervical back: Normal range of motion.      Right lower leg: No edema.      Left lower leg: No edema.   Skin:     General: Skin is warm and dry.      Coloration: Skin is not jaundiced or pale.   Neurological:      General: No focal deficit present.      Mental Status: He is alert and oriented to person, place, and time. Mental status is at baseline.      Gait: Gait normal.   Psychiatric:         Mood and Affect: Mood normal.         Behavior: Behavior normal.         Thought Content: Thought content normal.         Judgment: Judgment normal.         Past History  Past Medical History:   Diagnosis Date    BPH (benign prostatic hyperplasia)     Family history of malignant hyperthermia     GERD (gastroesophageal reflux disease)     Heart problem     Hyperlipidemia     Hypertension     Neuropathy     Seasonal allergies     TIA (transient ischemic attack)      Social History     Socioeconomic History    Marital status:      Spouse name: Not on file    Number of children: Not on file    Years of education: Not on file    Highest education level: Not on file   Occupational History    Not on file   Tobacco Use    Smoking status: Former     Current packs/day: 0.00     Types: Cigarettes     Quit date:      Years since quittin.6    Smokeless tobacco: Never   Vaping Use    Vaping status: Never Used   Substance and Sexual Activity    Alcohol use: Not Currently    Drug use: Never    Sexual activity: Not on file   Other Topics Concern    Not on file    Social History Narrative    Not on file     Social Determinants of Health     Financial Resource Strain: Low Risk  (4/22/2024)    Received from [facility]    Financial Resource Strain     Do you have any trouble paying for your medications, or do you think you might in the future?: No     Does your family have trouble paying for medicine? (Household - for ages 0-17 years): Not on file   Food Insecurity: No Food Insecurity (4/22/2024)    Received from [facility]    Hunger Vital Sign     Worried About Running Out of Food in the Last Year: Never true     Ran Out of Food in the Last Year: Never true   Transportation Needs: No Transportation Needs (4/22/2024)    Received from [facility]    Transportation Needs     READ ONLY Do you have trouble getting a ride to medical visits or work?: Never True     Does your family have a hard time getting a ride to doctors’ visits? (Household - for ages 0-17 years): Not on file     Has lack of transportation kept you from medical appointments, meetings, work, or from getting things needed for daily living? Check all that apply. (Adult - for ages 18 years and over): Not on file     Do you (or your family) have trouble finding or paying for a ride (transportation)? (Household - for ages 0-17 years): Not on file   Physical Activity: Not on file   Stress: Not on file   Social Connections: Socially Integrated (4/22/2024)    Received from [facility]    Social Connections     How often do you feel lonely or isolated from those around you?: Never   Intimate Partner Violence: Not on file   Housing Stability: Low Risk  (4/22/2024)    Received from [facility]    Housing Stability     Do you currently live in a shelter or have no steady place to sleep at night?: No     READ ONLY Do you think you are at risk of becoming homeless?: No     Does your family worry about paying for your home or becoming homeless? (Household - for ages 0-17 years): Not on file     Are you homeless or worried that you might  "be in the future? (Adult - for ages 18 years and over): Not on file     Are you (or your family) homeless or worried that you might be in the future? (Household - for ages 0-17 years): Not on file     Social History     Tobacco Use   Smoking Status Former    Current packs/day: 0.00    Types: Cigarettes    Quit date:     Years since quittin.6   Smokeless Tobacco Never     No family history on file.    The following portions of the patient's history were reviewed and updated as appropriate allergies, current medications, past medical history, past social history, past surgical history and problem list    Imaging:    Results  No results found for this or any previous visit (from the past 1 hour(s)).]  No results found for: \"PSA\"  Lab Results   Component Value Date    CALCIUM 9.0 2024    K 4.2 2024    CO2 24 2024     2024    BUN 15 2024    CREATININE 0.92 2024     Lab Results   Component Value Date    WBC 15.23 (H) 2024    HGB 12.6 2024    HCT 38.3 2024    MCV 95 2024     2024       Please Note:  Voice dictation software has been used to create this document. There may be inadvertent transcriptions errors.     EDDA Wilkins 24   "

## 2024-09-25 ENCOUNTER — OFFICE VISIT (OUTPATIENT)
Dept: UROLOGY | Facility: CLINIC | Age: 74
End: 2024-09-25

## 2024-09-25 VITALS
TEMPERATURE: 97.3 F | WEIGHT: 198.4 LBS | HEART RATE: 70 BPM | OXYGEN SATURATION: 97 % | DIASTOLIC BLOOD PRESSURE: 60 MMHG | BODY MASS INDEX: 28.4 KG/M2 | HEIGHT: 70 IN | SYSTOLIC BLOOD PRESSURE: 120 MMHG

## 2024-09-25 DIAGNOSIS — S39.840D FRACTURE OF CORPUS CAVERNOSUM PENIS, SUBSEQUENT ENCOUNTER: Primary | ICD-10-CM

## 2024-09-25 PROCEDURE — 99024 POSTOP FOLLOW-UP VISIT: CPT

## 2024-09-25 NOTE — PROGRESS NOTES
9/25/2024    No chief complaint on file.      Assessment and Plan    73 y.o. male manage by    Penile fracture  s/p degloving and repair of penile fracture by Dr. Mann on 8/11/2024.   Surgical incision has healed completely, no residual swelling.  Normal meatus.  Patient reports urinary stream is returned to baseline.  He is still able to obtain a partial erection.  He has baseline erectile dysfunction with need for PDE 5 inhibitors previously.  Follow-up as needed.      Interval HPI:    History of Present Illness  Chavo Reyes is a 73 y.o. male here for follow-up evaluation of status post degloving and repair of penile fracture by Dr. Mann on 8/11/2024.     Patient originally presented to [facility] emergency department due to pain in the penis following intercourse.  He was transferred to [facility] for management of presumed penile fracture. He has baseline erectile dysfunction with need for PDE 5 inhibitor. he finished intercourse, ejaculated then he urinated without a problem. He noted penile swelling with bruising.  No difficulty with urination. He was noted to have dark ecchymosis of the penile shaft which extends to the infrapubic region and a mild amount down to the scrotum. Some penile swelling ventrally, normal glans, no lesions no sign of infection in the scrotum is otherwise unremarkable, no testicular or scrotal swelling.  Postoperatively he did complain of mild spraying of his urine to the left in addition to his straight urine stream.  This was believed to be secondary to swelling of the glans of the penis due to a compression dressing as well as inflammation from procedure.     At his initial postop visit with me on 8/28/2024 he reported doing well following surgery.  He did slip and bumped into a table 2 days prior striking the penis.  He noted one of the sutures had opened. On exam incision is healing nicely, there is no wound dehiscence. There is a scant area  of yellow slough noted to the dorsal aspect of the incision midline just behind the glans. No erythema or drainage. The meatus and glans are still swollen, he has been experiencing spraying of his urinary stream to the right which has changed when he was experiencing sprain to the left initially postoperatively. He is voiding well without difficulties. He denies any pain. He is able to obtain a spontaneous erection. No reports of curvature. He reports that the bruising and swelling significantly improved 2 days ago.             Review of Systems   Constitutional:  Negative for chills and fever.   HENT:  Negative for ear pain and sore throat.    Eyes:  Negative for pain and visual disturbance.   Respiratory:  Negative for cough and shortness of breath.    Cardiovascular:  Negative for chest pain and palpitations.   Gastrointestinal:  Negative for abdominal pain and vomiting.   Genitourinary:  Negative for dysuria and hematuria.   Musculoskeletal:  Negative for arthralgias and back pain.   Skin:  Negative for color change and rash.   Neurological:  Negative for seizures and syncope.   All other systems reviewed and are negative.              Vitals  There were no vitals filed for this visit.    Physical Exam  Vitals reviewed.   Constitutional:       General: He is not in acute distress.     Appearance: Normal appearance. He is not ill-appearing or toxic-appearing.   HENT:      Head: Normocephalic and atraumatic.   Eyes:      General: No scleral icterus.     Conjunctiva/sclera: Conjunctivae normal.   Cardiovascular:      Rate and Rhythm: Normal rate.   Pulmonary:      Effort: Pulmonary effort is normal. No respiratory distress.   Abdominal:      Tenderness: There is no right CVA tenderness or left CVA tenderness.      Hernia: No hernia is present.   Genitourinary:     Comments: Normal phallus, no residual swelling.  Normal urethral meatus.    Musculoskeletal:      Cervical back: Normal range of motion.      Right lower  leg: No edema.      Left lower leg: No edema.   Skin:     General: Skin is warm and dry.      Coloration: Skin is not jaundiced or pale.   Neurological:      General: No focal deficit present.      Mental Status: He is alert and oriented to person, place, and time. Mental status is at baseline.      Gait: Gait normal.   Psychiatric:         Mood and Affect: Mood normal.         Behavior: Behavior normal.         Thought Content: Thought content normal.         Judgment: Judgment normal.           Please Note:  Voice dictation software has been used to create this document. There may be inadvertent transcriptions errors.     EDDA Wilkins 09/25/24

## 2024-10-22 ENCOUNTER — TELEPHONE (OUTPATIENT)
Age: 74
End: 2024-10-22

## 2024-10-22 DIAGNOSIS — S39.840D FRACTURE OF CORPUS CAVERNOSUM PENIS, SUBSEQUENT ENCOUNTER: Primary | ICD-10-CM

## 2024-10-22 DIAGNOSIS — N52.9 ERECTILE DYSFUNCTION, UNSPECIFIED ERECTILE DYSFUNCTION TYPE: ICD-10-CM

## 2024-10-22 RX ORDER — TADALAFIL 20 MG/1
20 TABLET ORAL AS NEEDED
Qty: 10 TABLET | Refills: 3 | Status: SHIPPED | OUTPATIENT
Start: 2024-10-22

## 2024-10-22 NOTE — TELEPHONE ENCOUNTER
Call placed to the patient and spoke with Chavo letting him know Cialis was sent to his local pharmacy.

## 2024-10-22 NOTE — TELEPHONE ENCOUNTER
"Patient called the RX Refill Line. Message is being forwarded to the office.     Patient is requesting a message be sent to the office regarding sildenafil (REVATIO) 20 mg tablet. He would like to know if there is a stronger prescription as he states \"his medication does not work\". Patient does not have any medication remaining, he would like a call back regarding decision. Please send medications to [id number] - [place], [place] - [address].    Please contact patient at [phone], if / when medication has been approved.    "

## 2024-12-04 ENCOUNTER — TELEPHONE (OUTPATIENT)
Dept: UROLOGY | Facility: CLINIC | Age: 74
End: 2024-12-04

## 2024-12-04 DIAGNOSIS — N52.9 ERECTILE DYSFUNCTION, UNSPECIFIED ERECTILE DYSFUNCTION TYPE: ICD-10-CM

## 2024-12-04 DIAGNOSIS — S39.840D FRACTURE OF CORPUS CAVERNOSUM PENIS, SUBSEQUENT ENCOUNTER: ICD-10-CM

## 2024-12-04 NOTE — TELEPHONE ENCOUNTER
Called and spoke with the patient to make him aware the 20 mg of Cialis is the maximum dosage.  Suggested Viagra but patient reports he has tried that medication in the past which was ineffective.    Patient requesting a refill of the Cialis.Please send to [facility] in [place].

## 2024-12-04 NOTE — TELEPHONE ENCOUNTER
Patient called the RX Refill Line. Message is being forwarded to the office.     Patient is requesting a refill on his tadalafil (CIALIS), but he was wondering if the doctor could increase the mg. He stated that the 20 mg does not seem to be working.    Please contact patient at [phone]

## 2024-12-05 RX ORDER — TADALAFIL 20 MG/1
20 TABLET ORAL AS NEEDED
Qty: 10 TABLET | Refills: 3 | Status: SHIPPED | OUTPATIENT
Start: 2024-12-05

## 2025-02-21 ENCOUNTER — HOSPITAL ENCOUNTER (EMERGENCY)
Facility: HOSPITAL | Age: 75
Discharge: HOME | End: 2025-02-21
Attending: EMERGENCY MEDICINE
Payer: COMMERCIAL

## 2025-02-21 ENCOUNTER — APPOINTMENT (EMERGENCY)
Dept: RADIOLOGY | Facility: HOSPITAL | Age: 75
End: 2025-02-21
Payer: COMMERCIAL

## 2025-02-21 ENCOUNTER — APPOINTMENT (EMERGENCY)
Dept: CT IMAGING | Facility: HOSPITAL | Age: 75
End: 2025-02-21
Payer: COMMERCIAL

## 2025-02-21 VITALS
RESPIRATION RATE: 18 BRPM | BODY MASS INDEX: 29.1 KG/M2 | SYSTOLIC BLOOD PRESSURE: 147 MMHG | WEIGHT: 203.26 LBS | HEIGHT: 70 IN | TEMPERATURE: 97.9 F | DIASTOLIC BLOOD PRESSURE: 65 MMHG | HEART RATE: 52 BPM | OXYGEN SATURATION: 97 %
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
## 2025-02-21 DIAGNOSIS — S20.219A RIB CONTUSION: ICD-10-CM

## 2025-02-21 DIAGNOSIS — S16.1XXA STRAIN OF NECK MUSCLE, INITIAL ENCOUNTER: ICD-10-CM

## 2025-02-21 PROCEDURE — 72125 CT NECK SPINE W/O DYE: CPT

## 2025-02-21 PROCEDURE — 71045 X-RAY EXAM CHEST 1 VIEW: CPT

## 2025-02-21 PROCEDURE — 99284 EMERGENCY DEPT VISIT MOD MDM: CPT

## 2025-02-21 PROCEDURE — 99284 EMERGENCY DEPT VISIT MOD MDM: CPT | Performed by: PHYSICIAN ASSISTANT

## 2025-02-21 PROCEDURE — 71250 CT THORAX DX C-: CPT

## 2025-02-21 PROCEDURE — 70450 CT HEAD/BRAIN W/O DYE: CPT

## 2025-02-21 PROCEDURE — 74176 CT ABD & PELVIS W/O CONTRAST: CPT

## 2025-02-21 RX ORDER — ACETAMINOPHEN 325 MG/1
650 TABLET ORAL ONCE
Status: COMPLETED | OUTPATIENT
Start: 2025-02-21 | End: 2025-02-21

## 2025-02-21 RX ORDER — LIDOCAINE 50 MG/G
1 PATCH TOPICAL DAILY
Qty: 6 PATCH | Refills: 0 | Status: SHIPPED | OUTPATIENT
Start: 2025-02-21

## 2025-02-21 RX ADMIN — ACETAMINOPHEN 325MG 650 MG: 325 TABLET ORAL at 20:19

## 2025-02-22 NOTE — DISCHARGE INSTRUCTIONS
Use Tylenol as needed.  Lidocaine patches sent to pharmacy to use as needed.  Please follow-up with your family doctor regarding incidental findings we discussed tonight.  Please return with new or worsening symptoms.  TRAUMA - CT head wo contrast   Final Result      No acute intracranial pathology. In particular, no intracranial hemorrhage or calvarial fracture.      The study was marked in EPIC for immediate notification given trauma designation.               Workstation performed: [id number]         TRAUMA - CT spine cervical wo contrast   Final Result      No cervical spine fracture or traumatic malalignment.      The study was marked in EPIC for immediate notification given trauma designation.               Workstation performed: [id number]         CT chest abdomen pelvis wo contrast   Final Result      1) No acute thoracic, abdominal, or pelvic trauma within limitation of a noncontrast study.      2) Several nonspecific subcentimeter pulmonary nodules measuring up to 4 mm. Based on current Fleischner Society 2017 Guidelines on incidental pulmonary nodules, no routine follow-up is needed if the patient is low risk for lung cancer.  If the patient    is high risk, optional follow-up chest CT in 12 months can be considered.      3) 4.3 x 4.2 cm fusiform ectasia of ascending thoracic aorta. This could also be followed up in 1 year.      4) Additional findings as above.      The study was marked in EPIC for immediate notification with trauma designation.         Workstation performed: [id number]         XR Trauma chest portable   Final Result      No acute pulmonary pathology.      No obvious displaced fractures within limitation of supine AP chest technique.                  Workstation performed: [id number]
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
## 2025-02-22 NOTE — ED PROVIDER NOTES
ED Disposition       None          Assessment & Plan   {Hyperlinks  Risk Stratification - NIHSS - HEART SCORE - Fill out sepsis note and make sure you call [phone] if severe or septic shock:[id number]}    Medical Decision Making           Medications - No data to display    ED Risk Strat Scores                                                History of Present Illness   {Hyperlinks  History (Med, Surg, Fam, Social) - Current Medications - Allergies  :[id number]}    No chief complaint on file.      Past Medical History:   Diagnosis Date    BPH (benign prostatic hyperplasia)     Family history of malignant hyperthermia     GERD (gastroesophageal reflux disease)     Heart problem     Hyperlipidemia     Hypertension     Neuropathy     Seasonal allergies     TIA (transient ischemic attack)       Past Surgical History:   Procedure Laterality Date    APPENDECTOMY      CAROTID ARTERY ANGIOPLASTY      CARPAL TUNNEL RELEASE      COLONOSCOPY      NH LAPAROSCOPY SURG RPR INITIAL INGUINAL HERNIA N/A 2023    Procedure: RIGHT ROBOTIC ASSISTED LAPAROSCOPIC INGUINAL HERNIA REPAIR, POSSIBLE LEFT;  Surgeon: Orquidea Fritz DO;  Location:  MAIN OR;  Service: General    NH PLASTIC OPERATION PENIS INJURY N/A 2024    Procedure: REPAIR PENILE FRACTURE/RUPTURE;  Surgeon: Johnny Mann MD;  Location:  MAIN OR;  Service: Urology      No family history on file.   Social History     Tobacco Use    Smoking status: Former     Current packs/day: 0.00     Types: Cigarettes     Quit date:      Years since quittin.1    Smokeless tobacco: Never   Vaping Use    Vaping status: Never Used   Substance Use Topics    Alcohol use: Not Currently    Drug use: Never      E-Cigarette/Vaping    E-Cigarette Use Never User       E-Cigarette/Vaping Substances    Nicotine No     THC No     CBD No     Flavoring No     Other No     Unknown No       I have reviewed and agree with the history as documented.     HPI    Review of  Systems        Objective   {Hyperlinks  Historical Vitals - Historical Labs - Chart Review/Microbiology - Last Echo - Code Status  :[id number]}    ED Triage Vitals   Temp Pulse BP Resp SpO2 Patient Position - Orthostatic VS   -- -- -- -- -- --      Temp src Heart Rate Source BP Location FiO2 (%) Pain Score    -- -- -- -- --      Vitals    None         Physical Exam    Results Reviewed       None            No orders to display       Procedures    ED Medication and Procedure Management   Prior to Admission Medications   Prescriptions Last Dose Informant Patient Reported? Taking?   Ascorbic Acid (vitamin C) 100 MG tablet  Self Yes No   Sig: Take 100 mg by mouth daily   B Complex Vitamins (Vitamin B Complex) TABS  Self Yes No   Sig: Take 1 tablet by mouth in the morning.   HYDROcodone-acetaminophen (NORCO) 5-325 mg per tablet  Self No No   Sig: Take 1-2 tablets by mouth every 6 (six) hours as needed for pain for up to 5 doses Max Daily Amount: 8 tablets   Multiple Vitamins-Iron TABS  Self Yes No   Sig: Take by mouth   Olopatadine HCl (Pataday) 0.7 % SOLN  Self Yes No   Sig: Apply 1 drop to eye daily Both eyes   Potassium 99 MG TABS  Self Yes No   Sig: Take 99 mg by mouth in the morning.   UNKNOWN TO PATIENT  Self Yes No   albuterol (PROVENTIL HFA,VENTOLIN HFA) 90 mcg/act inhaler  Self No No   Si-3 inhalations every 3-4 hours for 3 days and then as needed for cough, wheezing   amLODIPine (NORVASC) 5 mg tablet  Self Yes No   Si mg daily   aspirin 325 mg tablet  Self Yes No   Sig: Take 325 mg by mouth daily   atorvastatin (LIPITOR) 40 mg tablet  Self Yes No   Sig: Take by mouth daily   doxazosin (CARDURA) 4 mg tablet  Self Yes No   Sig: daily at bedtime   gabapentin (NEURONTIN) 600 MG tablet  Self Yes No   Si mg 2 (two) times a day   loratadine (CLARITIN) 10 mg tablet  Self Yes No   metoprolol succinate (TOPROL-XL) 50 mg 24 hr tablet  Self Yes No   Sig: daily   nitroglycerin (NITROSTAT) 0.4 mg SL tablet   Self Yes No   omeprazole (PriLOSEC) 40 MG capsule  Self Yes No   Sig: daily   simvastatin (ZOCOR) 20 mg tablet  Self Yes No   Sig: Take 20 mg by mouth   tadalafil (CIALIS) 20 MG tablet   No No   Sig: Take 1 tablet (20 mg total) by mouth if needed for erectile dysfunction Take 1 tablet as needed for erectile dysfunction. Do not take more that 3 times per week.      Facility-Administered Medications: None     Patient's Medications   Discharge Prescriptions    No medications on file     No discharge procedures on file.  ED SEPSIS DOCUMENTATION
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
## 2025-02-28 DIAGNOSIS — S39.840D FRACTURE OF CORPUS CAVERNOSUM PENIS, SUBSEQUENT ENCOUNTER: ICD-10-CM

## 2025-02-28 DIAGNOSIS — N52.9 ERECTILE DYSFUNCTION, UNSPECIFIED ERECTILE DYSFUNCTION TYPE: ICD-10-CM

## 2025-02-28 RX ORDER — TADALAFIL 20 MG/1
TABLET ORAL
Qty: 10 TABLET | Refills: 3 | Status: SHIPPED | OUTPATIENT
Start: 2025-02-28

## 2025-03-03 ENCOUNTER — HOSPITAL ENCOUNTER (EMERGENCY)
Facility: HOSPITAL | Age: 75
Discharge: HOME | End: 2025-03-03
Attending: EMERGENCY MEDICINE
Payer: COMMERCIAL

## 2025-03-03 VITALS
OXYGEN SATURATION: 95 % | RESPIRATION RATE: 16 BRPM | HEART RATE: 62 BPM | BODY MASS INDEX: 29.13 KG/M2 | TEMPERATURE: 97.4 F | DIASTOLIC BLOOD PRESSURE: 65 MMHG | SYSTOLIC BLOOD PRESSURE: 140 MMHG | WEIGHT: 203 LBS

## 2025-03-03 DIAGNOSIS — S16.1XXA STRAIN OF NECK MUSCLE, INITIAL ENCOUNTER: ICD-10-CM

## 2025-03-03 DIAGNOSIS — M54.2 NECK PAIN: Primary | ICD-10-CM

## 2025-03-03 PROCEDURE — 99283 EMERGENCY DEPT VISIT LOW MDM: CPT

## 2025-03-03 PROCEDURE — 99284 EMERGENCY DEPT VISIT MOD MDM: CPT | Performed by: EMERGENCY MEDICINE

## 2025-03-03 RX ORDER — OXYCODONE AND ACETAMINOPHEN 5; 325 MG/1; MG/1
1 TABLET ORAL ONCE
Refills: 0 | Status: COMPLETED | OUTPATIENT
Start: 2025-03-03 | End: 2025-03-03

## 2025-03-03 RX ORDER — METHYLPREDNISOLONE 4 MG/1
TABLET ORAL
Qty: 21 TABLET | Refills: 0 | Status: SHIPPED | OUTPATIENT
Start: 2025-03-03

## 2025-03-03 RX ORDER — TRAMADOL HYDROCHLORIDE 50 MG/1
50 TABLET ORAL EVERY 6 HOURS PRN
Qty: 15 TABLET | Refills: 0 | Status: SHIPPED | OUTPATIENT
Start: 2025-03-03 | End: 2025-03-13

## 2025-03-03 RX ORDER — METHOCARBAMOL 500 MG/1
500 TABLET, FILM COATED ORAL ONCE
Status: COMPLETED | OUTPATIENT
Start: 2025-03-03 | End: 2025-03-03

## 2025-03-03 RX ORDER — METHOCARBAMOL 500 MG/1
500 TABLET, FILM COATED ORAL 2 TIMES DAILY PRN
Qty: 20 TABLET | Refills: 0 | Status: SHIPPED | OUTPATIENT
Start: 2025-03-03

## 2025-03-03 RX ADMIN — METHOCARBAMOL 500 MG: 500 TABLET ORAL at 05:38

## 2025-03-03 RX ADMIN — OXYCODONE HYDROCHLORIDE AND ACETAMINOPHEN 1 TABLET: 5; 325 TABLET ORAL at 05:39
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
## 2025-03-13 RX ORDER — SILDENAFIL CITRATE 20 MG/1
100 TABLET ORAL
COMMUNITY
Start: 2024-10-29 | End: 2025-03-17

## 2025-03-17 ENCOUNTER — PREP FOR PROCEDURE (OUTPATIENT)
Dept: PAIN MEDICINE | Facility: CLINIC | Age: 75
End: 2025-03-17

## 2025-03-17 ENCOUNTER — CONSULT (OUTPATIENT)
Dept: PAIN MEDICINE | Facility: CLINIC | Age: 75
End: 2025-03-17
Payer: COMMERCIAL

## 2025-03-17 VITALS — WEIGHT: 200.6 LBS | HEIGHT: 70 IN | BODY MASS INDEX: 28.72 KG/M2

## 2025-03-17 DIAGNOSIS — M47.26 OTHER SPONDYLOSIS WITH RADICULOPATHY, LUMBAR REGION: ICD-10-CM

## 2025-03-17 DIAGNOSIS — M54.16 LUMBAR RADICULOPATHY: Primary | ICD-10-CM

## 2025-03-17 DIAGNOSIS — M54.12 CERVICAL RADICULOPATHY: Primary | ICD-10-CM

## 2025-03-17 DIAGNOSIS — M47.22 OTHER SPONDYLOSIS WITH RADICULOPATHY, CERVICAL REGION: Primary | ICD-10-CM

## 2025-03-17 PROCEDURE — 99204 OFFICE O/P NEW MOD 45 MIN: CPT | Performed by: ANESTHESIOLOGY
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
## 2025-03-17 NOTE — PATIENT INSTRUCTIONS
"Patient Education     Back exercises   The Basics   Written by the doctors and editors at [facility]   Why do I need to do back exercises? -- Back exercises can help ease back pain and might help prevent future back pain. Long term, it is important to strengthen the muscles in your lower back, buttocks, and belly. These are your \"core muscles.\" Stretching exercises are also important to keep your muscles flexible.  Below are some stretching and strengthening exercises that might help you. Other forms of movement can help ease or prevent back pain, too. For example, some people like to walk, do aerobic exercise, or do yoga or matthew chi. The most important thing is to move your body. Your doctor, nurse, or physical therapist can help you find different types of activity that work for you.  What stretching exercises should I do? -- Below are some examples of stretching exercises. Warm up your muscles before stretching to help prevent injury. To warm up, you can walk, jog, or cycle for a few minutes.  Start by repeating each of these stretches 2 to 3 times. Try to hold each stretch for 5 to 10 seconds, and try to do the stretches 2 to 3 times each day. Breathe slowly and deeply as you do the exercises. Never bounce when doing stretches.   Cat-cow stretch (figure 1) - Start on all fours on the floor, with your hands under your shoulders, knees under your hips, and your back flat. First, tuck your chin and tighten your stomach muscles as you round your back (like a \"cat\"). Hold the stretch for about 10 seconds. Rest for a few seconds as you flatten your back. Next, lift your chin and let your belly and lower back sink toward the floor (like a \"cow\"). Hold the stretch for about 10 seconds.   Single knee-to-chest stretches (figure 2) ? While lying on your back, bend your knees with your feet flat on the floor. Pull 1 knee toward your chest until you feel a stretch in your lower back and buttock area. Lower, and repeat with the " other knee. If you have knee problems, pull your knee up by grabbing the back of your thigh instead of the front of your knee. You can also do this exercise by grabbing both knees at the same time.   Lower trunk rotations (figure 3) ? While lying on your back, bend your knees with your feet flat on the floor. Keep your knees and ankles together, and then drop them to 1 side. Keep both of your shoulders touching the floor until you feel a stretch in the muscles at the side of the back. Repeat on the other side.   Lower back stretches seated (figure 4) ? Sit in a chair with your feet spread about shoulder width apart. Then, lean forward until you feel a stretch in your lower back.  What strengthening exercises should I do? -- Below are some examples of strengthening exercises.  Start by doing each exercise 2 to 3 times. Work up to doing each exercise 10 times. Hold each exercise for 3 to 5 seconds, and try to do the exercises 2 to 3 times each day. Do all exercises slowly.   Shoulder blade squeezes (figure 5) ? Pinch your shoulder blades together on your upper back, and hold 3 to 5 seconds. You can also do these 1 side at a time. Sit with good posture, and make sure that your shoulders do not rise up when you do this exercise. Relax.   Pelvic tilts (figure 6) ? Lie on your back with your knees bent and feet flat on the floor. Tighten your stomach muscles, and press your lower back down to the floor. Relax. You should be able to breathe comfortably during this exercise.   Hip lifts (figure 7) ? Lie on your back with your knees bent and feet flat on the floor. Tighten your stomach muscles, keep your back flat, and lift your buttocks off of the floor. Relax. You should feel this in your buttocks, not in your lower back.  What else should I know?    Exercise, including stretching, might be slightly uncomfortable. But you should not have sharp or severe pain. If you do get severe pain, stop what you are doing. If severe  "pain continues, call your doctor or nurse.   Do not hold your breath when exercising. If you tend to hold your breath, try counting out loud when exercising. If any exercise bothers you, stop right away.   Always warm up before exercising. Warm muscles stretch much easier than cool muscles. Stretching cool muscles can lead to injury.   Doing exercises before a meal can be a good way to get into a routine.  All topics are updated as new evidence becomes available and our peer review process is complete.  This topic retrieved from [employer] on: Apr 03, 2024.  Topic 853495 Version 2.0  Release: 32.2.4 - C32.92  © 2024 [employer] and/or its affiliates. All rights reserved.  figure 1: Cat-cow stretch     Start on all fours on the floor, with hands under your shoulders, knees under your hips, and your back flat. First, tuck your chin and tighten your stomach muscles as you round your back (like a \"cat\"). Hold the stretch for about 10 seconds. Rest for a few seconds as you flatten your back. Next, lift your chin and let your belly and lower back sink toward the floor (like a \"cow\"). Hold the stretch for about 10 seconds.  Graphic [id number] Version 1.0  figure 2: Single knee-to-chest stretch     Lie on your back, bend your knees, and have your feet flat on the floor. Pull 1 knee toward your chest until you feel a stretch in your lower back and buttock area. Repeat with the other knee. If you have knee problems, pull your knee up by grabbing the back of your thigh instead of the front of your knee. You can also do this exercise by grabbing both knees at the same time.  Graphic [id number] Version 1.0  figure 3: Lower trunk rotation     While lying on your back, bend your knees and have your feet flat on the floor. Keep your legs together and then drop them to 1 side. Keep both of your shoulders touching the floor until you feel a stretch in the muscles at the side of the back. Repeat on the other side.  Graphic [id number] Version " 1.0  figure 4: Lower back stretch     Sit in a chair with your feet spread about shoulder width apart. Then, lean forward until you feel a stretch in your lower back.  Graphic [id number] Version 1.0  figure 5: Shoulder blade squeezes     Pinch your shoulder blades together on your upper back and hold for 3 to 5 seconds. Make sure that you are sitting with good posture and that your shoulders do not raise up when you do this exercise. Relax.  Graphic [id number] Version 1.0  figure 6: Pelvic tilts     Lie on your back with your knees bent and feet flat on the floor. Tighten your stomach muscles and press your lower back down to the floor. Relax.  Graphic [id number] Version 1.0  figure 7: Hip lifts     Lie on your back with your knees bent and feet flat on the floor. Tighten your stomach muscles and lift your buttocks off of the floor. Relax.  Graphic 137523 Version 1.0  Consumer Information Use and Disclaimer   Disclaimer: This generalized information is a limited summary of diagnosis, treatment, and/or medication information. It is not meant to be comprehensive and should be used as a tool to help the user understand and/or assess potential diagnostic and treatment options. It does NOT include all information about conditions, treatments, medications, side effects, or risks that may apply to a specific patient. It is not intended to be medical advice or a substitute for the medical advice, diagnosis, or treatment of a health care provider based on the health care provider's examination and assessment of a patient's specific and unique circumstances. Patients must speak with a health care provider for complete information about their health, medical questions, and treatment options, including any risks or benefits regarding use of medications. This information does not endorse any treatments or medications as safe, effective, or approved for treating a specific patient. [employer] and its affiliates disclaim any warranty or  liability relating to this information or the use thereof.The use of this information is governed by the Terms of Use, available at https://www.wolterskluwer.com/en/know/clinical-effectiveness-terms. 2024© [employer] and its affiliates and/or licensors. All rights reserved.  Copyright   © 2024 [employer] and/or its affiliates. All rights reserved.
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
## 2025-03-25 ENCOUNTER — HOSPITAL ENCOUNTER (OUTPATIENT)
Dept: INTERVENTIONAL RADIOLOGY/VASCULAR | Facility: HOSPITAL | Age: 75
Discharge: HOME | End: 2025-03-25
Attending: ANESTHESIOLOGY | Admitting: ANESTHESIOLOGY
Payer: COMMERCIAL

## 2025-03-25 VITALS
RESPIRATION RATE: 18 BRPM | SYSTOLIC BLOOD PRESSURE: 159 MMHG | HEIGHT: 70 IN | HEART RATE: 51 BPM | TEMPERATURE: 97.7 F | BODY MASS INDEX: 28.63 KG/M2 | DIASTOLIC BLOOD PRESSURE: 75 MMHG | WEIGHT: 200 LBS | OXYGEN SATURATION: 96 %

## 2025-03-25 DIAGNOSIS — M54.12 CERVICAL RADICULOPATHY: ICD-10-CM

## 2025-03-25 PROCEDURE — 62321 NJX INTERLAMINAR CRV/THRC: CPT | Performed by: ANESTHESIOLOGY

## 2025-03-25 RX ORDER — LIDOCAINE HYDROCHLORIDE 10 MG/ML
INJECTION, SOLUTION EPIDURAL; INFILTRATION; INTRACAUDAL; PERINEURAL AS NEEDED
Status: COMPLETED | OUTPATIENT
Start: 2025-03-25 | End: 2025-03-25

## 2025-03-25 RX ORDER — 0.9 % SODIUM CHLORIDE 0.9 %
VIAL (ML) INJECTION AS NEEDED
Status: COMPLETED | OUTPATIENT
Start: 2025-03-25 | End: 2025-03-25

## 2025-03-25 RX ORDER — METHYLPREDNISOLONE ACETATE 80 MG/ML
INJECTION, SUSPENSION INTRA-ARTICULAR; INTRALESIONAL; INTRAMUSCULAR; PARENTERAL; SOFT TISSUE AS NEEDED
Status: COMPLETED | OUTPATIENT
Start: 2025-03-25 | End: 2025-03-25

## 2025-03-25 RX ADMIN — IOHEXOL 1 ML: 240 INJECTION, SOLUTION INTRATHECAL; INTRAVASCULAR; INTRAVENOUS; ORAL at 11:22

## 2025-03-25 RX ADMIN — METHYLPREDNISOLONE ACETATE 80 MG: 80 INJECTION, SUSPENSION INTRA-ARTICULAR; INTRALESIONAL; INTRAMUSCULAR; SOFT TISSUE at 11:22

## 2025-03-25 RX ADMIN — LIDOCAINE HYDROCHLORIDE 5 ML: 10 INJECTION, SOLUTION EPIDURAL; INFILTRATION; INTRACAUDAL; PERINEURAL at 11:20

## 2025-03-25 RX ADMIN — SODIUM CHLORIDE 3 ML: 9 INJECTION INTRAMUSCULAR; INTRAVENOUS; SUBCUTANEOUS at 11:22

## 2025-03-25 NOTE — DISCHARGE INSTR - AVS FIRST PAGE
YOUR 2 WEEK FOLLOW UP HAS BEEN SCHEDULED; IF YOU WISH TO CHANGE THE FOLLOW UP, PLEASE CALL THE [facility] AT [place]: [phone]    EPIDURAL STEROID INJECTION DISCHARGE INSTRUCTIONS  WHAT YOU NEED TO KNOW:   An epidural steroid injection (MARTIN) is a procedure to inject steroid medicine into the epidural space. The epidural space is between your spinal cord and vertebrae. Steroids reduce inflammation and fluid buildup in your spine that may be causing pain. You may be given pain medicine along with the steroids.        DISCHARGE INSTRUCTIONS:   Call your local emergency number (911 in the [place]) if:   You have a seizure.    You have trouble moving your legs.    Seek care immediately if:   Blood soaks through your bandage.    You have a fever or chills, severe back pain, and the procedure area is sensitive to the touch.    You cannot control when you urinate or have a bowel movement.    Call your doctor if:   You have weakness or numbness in your legs.    Your wound is red, swollen, or draining pus.    You have nausea or are vomiting.    Your face or neck is red and you feel warm.    You have more pain than you had before the procedure.    You have swelling in your hands or feet.    You have questions or concerns about your condition or care.    Care for your wound as directed:  You may remove the bandage before you go to bed the day of your procedure. You may take a shower, but do not take a bath for at least 24 hours.   Self-care:   Do not drive,  use machines, or do strenuous activity for 24 hours after your procedure or as directed.     Continue other treatments  as directed. Steroid injections alone will not control your pain. The injections are meant to be used with other treatments, such as physical therapy.    Follow up with your doctor as directed:  Write down your questions so you remember to ask them during your visits.           ACTIVITY  Do not drive or operate machinery today.  No strenuous  activity today - bending, lifting, etc.   You may resume normal activities starting tomorrow - start slowly and as tolerated.  You may shower today, but not tub baths or hot tubs.  You may have numbness for several hours from the local anesthetics. Please use caution and common sense, especially with weight-bearing activities.    CARE OF THE INJECTION SITE  If you have soreness or pain apply ice to the area today (20 minutes on and 20 minutes off).  Starting tomorrow, you may resume normal activities  Notify the [facility] if you have any of the following: redness, drainage, swelling or fever above 100°F.      MEDICATIONS  Continue to take all routine medications.  Our office may have instructed you to hold some medications. You may restart medications, including blood thinners.

## 2025-03-25 NOTE — INTERVAL H&P NOTE
H&P reviewed. After examining the patient I find no changes in the patients condition since the H&P had been written.    Vitals:    03/25/25 1021   BP: 156/75   Pulse: 56   Resp: 18   Temp: 97.6 °F (36.4 °C)   SpO2: 95%

## 2025-04-04 ENCOUNTER — HOSPITAL ENCOUNTER (OUTPATIENT)
Dept: MRI IMAGING | Facility: HOSPITAL | Age: 75
End: 2025-04-04
Attending: ANESTHESIOLOGY
Payer: COMMERCIAL

## 2025-04-04 DIAGNOSIS — M47.22 OTHER SPONDYLOSIS WITH RADICULOPATHY, CERVICAL REGION: ICD-10-CM

## 2025-04-04 DIAGNOSIS — M47.26 OTHER SPONDYLOSIS WITH RADICULOPATHY, LUMBAR REGION: ICD-10-CM

## 2025-04-04 PROCEDURE — 72141 MRI NECK SPINE W/O DYE: CPT

## 2025-04-04 PROCEDURE — 72148 MRI LUMBAR SPINE W/O DYE: CPT

## 2025-04-08 ENCOUNTER — HOSPITAL ENCOUNTER (OUTPATIENT)
Dept: INTERVENTIONAL RADIOLOGY/VASCULAR | Facility: HOSPITAL | Age: 75
Discharge: HOME | End: 2025-04-08
Attending: ANESTHESIOLOGY
Payer: COMMERCIAL

## 2025-04-08 VITALS
RESPIRATION RATE: 18 BRPM | WEIGHT: 190 LBS | SYSTOLIC BLOOD PRESSURE: 148 MMHG | HEIGHT: 70 IN | TEMPERATURE: 97.3 F | DIASTOLIC BLOOD PRESSURE: 71 MMHG | BODY MASS INDEX: 27.2 KG/M2 | HEART RATE: 53 BPM | OXYGEN SATURATION: 97 %

## 2025-04-08 DIAGNOSIS — M54.16 LUMBAR RADICULOPATHY: ICD-10-CM

## 2025-04-08 PROCEDURE — 62323 NJX INTERLAMINAR LMBR/SAC: CPT | Performed by: ANESTHESIOLOGY

## 2025-04-08 RX ORDER — METHYLPREDNISOLONE ACETATE 80 MG/ML
INJECTION, SUSPENSION INTRA-ARTICULAR; INTRALESIONAL; INTRAMUSCULAR; PARENTERAL; SOFT TISSUE AS NEEDED
Status: COMPLETED | OUTPATIENT
Start: 2025-04-08 | End: 2025-04-08

## 2025-04-08 RX ORDER — LIDOCAINE HYDROCHLORIDE 10 MG/ML
INJECTION, SOLUTION EPIDURAL; INFILTRATION; INTRACAUDAL; PERINEURAL AS NEEDED
Status: COMPLETED | OUTPATIENT
Start: 2025-04-08 | End: 2025-04-08

## 2025-04-08 RX ORDER — 0.9 % SODIUM CHLORIDE 0.9 %
VIAL (ML) INJECTION AS NEEDED
Status: COMPLETED | OUTPATIENT
Start: 2025-04-08 | End: 2025-04-08

## 2025-04-08 RX ADMIN — LIDOCAINE HYDROCHLORIDE 5 ML: 10 INJECTION, SOLUTION EPIDURAL; INFILTRATION; INTRACAUDAL; PERINEURAL at 07:42

## 2025-04-08 RX ADMIN — SODIUM CHLORIDE 3 ML: 9 INJECTION INTRAMUSCULAR; INTRAVENOUS; SUBCUTANEOUS at 07:45

## 2025-04-08 RX ADMIN — IOHEXOL 2 ML: 240 INJECTION, SOLUTION INTRATHECAL; INTRAVASCULAR; INTRAVENOUS; ORAL at 07:45

## 2025-04-08 RX ADMIN — METHYLPREDNISOLONE ACETATE 80 MG: 80 INJECTION, SUSPENSION INTRA-ARTICULAR; INTRALESIONAL; INTRAMUSCULAR; SOFT TISSUE at 07:45

## 2025-04-08 NOTE — INTERVAL H&P NOTE
H&P reviewed. After examining the patient I find no changes in the patients condition since the H&P had been written.    Vitals:    04/08/25 0705   BP: 126/62   Pulse: 62   Resp: 18   Temp: (!) 97.3 °F (36.3 °C)   SpO2: 95%

## 2025-04-08 NOTE — DISCHARGE INSTR - AVS FIRST PAGE
YOUR 2 WEEK FOLLOW UP HAS BEEN SCHEDULED; IF YOU WISH TO CHANGE THE FOLLOW UP, PLEASE CALL THE [facility] AT [place]: [phone]    EPIDURAL STEROID INJECTION DISCHARGE INSTRUCTIONS  WHAT YOU NEED TO KNOW:   An epidural steroid injection (MARTIN) is a procedure to inject steroid medicine into the epidural space. The epidural space is between your spinal cord and vertebrae. Steroids reduce inflammation and fluid buildup in your spine that may be causing pain. You may be given pain medicine along with the steroids.        DISCHARGE INSTRUCTIONS:   Call your local emergency number (911 in the [place]) if:   You have a seizure.    You have trouble moving your legs.    Seek care immediately if:   Blood soaks through your bandage.    You have a fever or chills, severe back pain, and the procedure area is sensitive to the touch.    You cannot control when you urinate or have a bowel movement.    Call your doctor if:   You have weakness or numbness in your legs.    Your wound is red, swollen, or draining pus.    You have nausea or are vomiting.    Your face or neck is red and you feel warm.    You have more pain than you had before the procedure.    You have swelling in your hands or feet.    You have questions or concerns about your condition or care.    Care for your wound as directed:  You may remove the bandage before you go to bed the day of your procedure. You may take a shower, but do not take a bath for at least 24 hours.   Self-care:   Do not drive,  use machines, or do strenuous activity for 24 hours after your procedure or as directed.     Continue other treatments  as directed. Steroid injections alone will not control your pain. The injections are meant to be used with other treatments, such as physical therapy.    Follow up with your doctor as directed:  Write down your questions so you remember to ask them during your visits.           ACTIVITY  Do not drive or operate machinery today.  No strenuous  activity today - bending, lifting, etc.   You may resume normal activities starting tomorrow - start slowly and as tolerated.  You may shower today, but not tub baths or hot tubs.  You may have numbness for several hours from the local anesthetics. Please use caution and common sense, especially with weight-bearing activities.    CARE OF THE INJECTION SITE  If you have soreness or pain apply ice to the area today (20 minutes on and 20 minutes off).  Starting tomorrow, you may resume normal activities  Notify the [facility] if you have any of the following: redness, drainage, swelling or fever above 100°F.      MEDICATIONS  Continue to take all routine medications.  Our office may have instructed you to hold some medications. You may restart medications, including blood thinners.

## 2025-04-13 DIAGNOSIS — N52.9 ERECTILE DYSFUNCTION, UNSPECIFIED ERECTILE DYSFUNCTION TYPE: ICD-10-CM

## 2025-04-13 DIAGNOSIS — S39.840D FRACTURE OF CORPUS CAVERNOSUM PENIS, SUBSEQUENT ENCOUNTER: ICD-10-CM

## 2025-04-15 RX ORDER — TADALAFIL 20 MG/1
TABLET ORAL
Qty: 10 TABLET | Refills: 3 | Status: SHIPPED | OUTPATIENT
Start: 2025-04-15

## 2025-04-24 ENCOUNTER — OFFICE VISIT (OUTPATIENT)
Dept: PAIN MEDICINE | Facility: CLINIC | Age: 75
End: 2025-04-24
Payer: COMMERCIAL

## 2025-04-24 VITALS — HEIGHT: 70 IN | BODY MASS INDEX: 28.92 KG/M2 | WEIGHT: 202 LBS

## 2025-04-24 DIAGNOSIS — M47.812 CERVICAL SPONDYLOSIS: ICD-10-CM

## 2025-04-24 DIAGNOSIS — M47.816 LUMBAR SPONDYLOSIS: Primary | ICD-10-CM

## 2025-04-24 PROCEDURE — G2211 COMPLEX E/M VISIT ADD ON: HCPCS | Performed by: ANESTHESIOLOGY

## 2025-04-24 PROCEDURE — 99214 OFFICE O/P EST MOD 30 MIN: CPT | Performed by: ANESTHESIOLOGY

## 2025-04-25 NOTE — PATIENT INSTRUCTIONS
Patient Education     Neck Pain Exercises   About this topic   The neck or cervical spine has 7 spinal bones that run from the base of your skull to the upper back. These spinal bones have discs in between them. Discs act as shock absorbers. Ligaments are strong bands of tissue that hold the bones together. Many muscles surround and attach on these bones. Nerves come off of the spinal cord and exit out of small spaces in between the spinal bones. You can have neck pain if any of these are injured or damaged. Exercises may help to make this problem better.  General   Before starting with a program, ask your doctor if you are healthy enough to do these exercises. Your doctor may have you work with a  or physical therapist to make a safe exercise program to meet your needs. You should not do the exercises if they cause sharp pains, if you feel dizzy, or if you have vision changes.  Stretching Exercises   Stretching exercises keep your muscles flexible. They also stop them from getting tight. Start by doing each of these stretches 2 to 3 times. In order for your body to make changes, you will need to hold these stretches for 20 to 30 seconds. Try to do the stretches 2 to 3 times each day. Do all exercises slowly.  Passive neck stretches:  Put your left hand on top of your head. Your other arm can be at your side or behind your back. Pull your head toward your left shoulder until you feel a gentle stretch on the right side of your neck. Repeat on the other side using your other hand.  Also, try this stretch by pulling in a diagonal direction. With your left hand on top of your head, pull your head down towards the direction of your left knee. You should feel this stretch toward the back on the right side of your neck. Repeat on the other side.  Active neck stretches:  Neck front-to-back motion ? Look down to the floor until you feel a stretch in the back of your neck. Hold. Next, look up to the ceiling until you  feel a stretch in the front of your neck. Hold.  Neck side-to-side motion ? Tilt your head to the side and bring your ear to your shoulder until you feel a stretch on the other side of your neck. Hold. Next, tilt your head to the other side until you feel a stretch. Hold.  Neck turning ? Turn only your head and look over your left shoulder until you feel stretching in the right side of your neck. Hold. Now turn only your head and look over your right shoulder until you feel a stretch in the left side of your neck. Hold.  Scalene stretches ? Grasp your head with the hand opposite the side you want to stretch. Pull your head to the side until you feel a stretch. Now, slowly turn your head so your chin is pointed upwards.  Chin tucks ? Stand straight or lie down on your back. Tuck your chin in and lengthen the back of your neck. Return to the starting position and repeat. It may help to stand up against a wall during this exercise. Try gently pushing your chin with two fingers while trying to flatten your neck against the wall. If you do this exercise lying down, try using a small rolled up washcloth under your neck. Push down into the washcloth when tucking in your chin.  Strengthening Exercises   Strengthening exercises keep your muscles firm and strong. Start by repeating each exercise 2 to 3 times. Work up to doing each exercise 10 times. Try to do the exercises 2 to 3 times each day. Hold each exercise for 3 to 5 seconds. Do all exercises slowly.  Shoulder blade squeezes ? Pinch your shoulder blades together on your upper back and hold 3 to 5 seconds. Relax.             What will the results be?   Less pain and stiffness  Better range of motion  Increased strength  Help you heal faster after an injury or surgery  Increase blood flow to a body part  Help you feel better and more relaxed  Give you more energy  More toned looking muscles  Better posture  Easier to do daily activities  Helpful tips   Stay active and  work out to keep your muscles strong and flexible.  Be sure you do not hold your breath when exercising. This can raise your blood pressure. If you tend to hold your breath, try counting out loud when exercising. If any exercise bothers you, stop right away.  Try swinging your arms at an easy pace for a few minutes to warm up your muscles. Do this again after exercising.  Doing exercises before a meal may be a good way to get into a routine.  Exercise may be slightly uncomfortable, but you should not have sharp pains. If you do get sharp pains, stop what you are doing. If the sharp pains continue, call your doctor.  Last Reviewed Date   2020-03-10  Consumer Information Use and Disclaimer   This generalized information is a limited summary of diagnosis, treatment, and/or medication information. It is not meant to be comprehensive and should be used as a tool to help the user understand and/or assess potential diagnostic and treatment options. It does NOT include all information about conditions, treatments, medications, side effects, or risks that may apply to a specific patient. It is not intended to be medical advice or a substitute for the medical advice, diagnosis, or treatment of a health care provider based on the health care provider's examination and assessment of a patient’s specific and unique circumstances. Patients must speak with a health care provider for complete information about their health, medical questions, and treatment options, including any risks or benefits regarding use of medications. This information does not endorse any treatments or medications as safe, effective, or approved for treating a specific patient. [employer] and its affiliates disclaim any warranty or liability relating to this information or the use thereof. The use of this information is governed by the Terms of Use, available at https://www.[employer].com/en/know/clinical-effectiveness-terms   Copyright   Copyright ©  "2024 [employer] and its affiliates and/or licensors. All rights reserved.  Patient Education     Back exercises   The Basics   Written by the doctors and editors at [employer]   Why do I need to do back exercises? -- Back exercises can help ease back pain and might help prevent future back pain. Long term, it is important to strengthen the muscles in your lower back, buttocks, and belly. These are your \"core muscles.\" Stretching exercises are also important to keep your muscles flexible.  Below are some stretching and strengthening exercises that might help you. Other forms of movement can help ease or prevent back pain, too. For example, some people like to walk, do aerobic exercise, or do yoga or matthew chi. The most important thing is to move your body. Your doctor, nurse, or physical therapist can help you find different types of activity that work for you.  What stretching exercises should I do? -- Below are some examples of stretching exercises. Warm up your muscles before stretching to help prevent injury. To warm up, you can walk, jog, or cycle for a few minutes.  Start by repeating each of these stretches 2 to 3 times. Try to hold each stretch for 5 to 10 seconds, and try to do the stretches 2 to 3 times each day. Breathe slowly and deeply as you do the exercises. Never bounce when doing stretches.   Cat-cow stretch (figure 1) - Start on all fours on the floor, with your hands under your shoulders, knees under your hips, and your back flat. First, tuck your chin and tighten your stomach muscles as you round your back (like a \"cat\"). Hold the stretch for about 10 seconds. Rest for a few seconds as you flatten your back. Next, lift your chin and let your belly and lower back sink toward the floor (like a \"cow\"). Hold the stretch for about 10 seconds.   Single knee-to-chest stretches (figure 2) ? While lying on your back, bend your knees with your feet flat on the floor. Pull 1 knee toward your chest until you " feel a stretch in your lower back and buttock area. Lower, and repeat with the other knee. If you have knee problems, pull your knee up by grabbing the back of your thigh instead of the front of your knee. You can also do this exercise by grabbing both knees at the same time.   Lower trunk rotations (figure 3) ? While lying on your back, bend your knees with your feet flat on the floor. Keep your knees and ankles together, and then drop them to 1 side. Keep both of your shoulders touching the floor until you feel a stretch in the muscles at the side of the back. Repeat on the other side.   Lower back stretches seated (figure 4) ? Sit in a chair with your feet spread about shoulder width apart. Then, lean forward until you feel a stretch in your lower back.  What strengthening exercises should I do? -- Below are some examples of strengthening exercises.  Start by doing each exercise 2 to 3 times. Work up to doing each exercise 10 times. Hold each exercise for 3 to 5 seconds, and try to do the exercises 2 to 3 times each day. Do all exercises slowly.   Shoulder blade squeezes (figure 5) ? Pinch your shoulder blades together on your upper back, and hold 3 to 5 seconds. You can also do these 1 side at a time. Sit with good posture, and make sure that your shoulders do not rise up when you do this exercise. Relax.   Pelvic tilts (figure 6) ? Lie on your back with your knees bent and feet flat on the floor. Tighten your stomach muscles, and press your lower back down to the floor. Relax. You should be able to breathe comfortably during this exercise.   Hip lifts (figure 7) ? Lie on your back with your knees bent and feet flat on the floor. Tighten your stomach muscles, keep your back flat, and lift your buttocks off of the floor. Relax. You should feel this in your buttocks, not in your lower back.  What else should I know?    Exercise, including stretching, might be slightly uncomfortable. But you should not have sharp  "or severe pain. If you do get severe pain, stop what you are doing. If severe pain continues, call your doctor or nurse.   Do not hold your breath when exercising. If you tend to hold your breath, try counting out loud when exercising. If any exercise bothers you, stop right away.   Always warm up before exercising. Warm muscles stretch much easier than cool muscles. Stretching cool muscles can lead to injury.   Doing exercises before a meal can be a good way to get into a routine.  All topics are updated as new evidence becomes available and our peer review process is complete.  This topic retrieved from [employer] on: Apr 03, 2024.  Topic 428868 Version 2.0  Release: 32.2.4 - C32.92  © 2024 [employer] Inc. and/or its affiliates. All rights reserved.  figure 1: Cat-cow stretch     Start on all fours on the floor, with hands under your shoulders, knees under your hips, and your back flat. First, tuck your chin and tighten your stomach muscles as you round your back (like a \"cat\"). Hold the stretch for about 10 seconds. Rest for a few seconds as you flatten your back. Next, lift your chin and let your belly and lower back sink toward the floor (like a \"cow\"). Hold the stretch for about 10 seconds.  Graphic [id number] Version 1.0  figure 2: Single knee-to-chest stretch     Lie on your back, bend your knees, and have your feet flat on the floor. Pull 1 knee toward your chest until you feel a stretch in your lower back and buttock area. Repeat with the other knee. If you have knee problems, pull your knee up by grabbing the back of your thigh instead of the front of your knee. You can also do this exercise by grabbing both knees at the same time.  Graphic [id number] Version 1.0  figure 3: Lower trunk rotation     While lying on your back, bend your knees and have your feet flat on the floor. Keep your legs together and then drop them to 1 side. Keep both of your shoulders touching the floor until you feel a stretch in the muscles " at the side of the back. Repeat on the other side.  Graphic [id number] Version 1.0  figure 4: Lower back stretch     Sit in a chair with your feet spread about shoulder width apart. Then, lean forward until you feel a stretch in your lower back.  Graphic [id number] Version 1.0  figure 5: Shoulder blade squeezes     Pinch your shoulder blades together on your upper back and hold for 3 to 5 seconds. Make sure that you are sitting with good posture and that your shoulders do not raise up when you do this exercise. Relax.  Graphic [id number] Version 1.0  figure 6: Pelvic tilts     Lie on your back with your knees bent and feet flat on the floor. Tighten your stomach muscles and press your lower back down to the floor. Relax.  Graphic [id number] Version 1.0  figure 7: Hip lifts     Lie on your back with your knees bent and feet flat on the floor. Tighten your stomach muscles and lift your buttocks off of the floor. Relax.  Graphic [id number] Version 1.0  Consumer Information Use and Disclaimer   Disclaimer: This generalized information is a limited summary of diagnosis, treatment, and/or medication information. It is not meant to be comprehensive and should be used as a tool to help the user understand and/or assess potential diagnostic and treatment options. It does NOT include all information about conditions, treatments, medications, side effects, or risks that may apply to a specific patient. It is not intended to be medical advice or a substitute for the medical advice, diagnosis, or treatment of a health care provider based on the health care provider's examination and assessment of a patient's specific and unique circumstances. Patients must speak with a health care provider for complete information about their health, medical questions, and treatment options, including any risks or benefits regarding use of medications. This information does not endorse any treatments or medications as safe, effective, or approved for treating a  specific patient. [employer] and its affiliates disclaim any warranty or liability relating to this information or the use thereof.The use of this information is governed by the Terms of Use, available at https://www.[employer].com/en/know/clinical-effectiveness-terms. 2024© UpToDate, Inc. and its affiliates and/or licensors. All rights reserved.  Copyright   © 2024 Up[employer]Date, Inc. and/or its affiliates. All rights reserved.
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
## 2025-05-14 ENCOUNTER — APPOINTMENT (OUTPATIENT)
Dept: RADIOLOGY | Facility: CLINIC | Age: 75
End: 2025-05-14
Payer: COMMERCIAL

## 2025-05-14 ENCOUNTER — OFFICE VISIT (OUTPATIENT)
Dept: URGENT CARE | Facility: CLINIC | Age: 75
End: 2025-05-14
Payer: COMMERCIAL

## 2025-05-14 VITALS
BODY MASS INDEX: 27.92 KG/M2 | HEIGHT: 70 IN | RESPIRATION RATE: 20 BRPM | TEMPERATURE: 97 F | HEART RATE: 61 BPM | OXYGEN SATURATION: 98 % | WEIGHT: 195 LBS | SYSTOLIC BLOOD PRESSURE: 140 MMHG | DIASTOLIC BLOOD PRESSURE: 82 MMHG

## 2025-05-14 DIAGNOSIS — Z20.822 EXPOSURE TO COVID-19 VIRUS: ICD-10-CM

## 2025-05-14 DIAGNOSIS — J32.9 SINOBRONCHITIS: Primary | ICD-10-CM

## 2025-05-14 DIAGNOSIS — J40 SINOBRONCHITIS: Primary | ICD-10-CM

## 2025-05-14 DIAGNOSIS — R05.1 ACUTE COUGH: ICD-10-CM

## 2025-05-14 LAB
SARS-COV-2 AG UPPER RESP QL IA: NEGATIVE
VALID CONTROL: NORMAL

## 2025-05-14 PROCEDURE — 87811 SARS-COV-2 COVID19 W/OPTIC: CPT

## 2025-05-14 PROCEDURE — 71046 X-RAY EXAM CHEST 2 VIEWS: CPT

## 2025-05-14 PROCEDURE — S9088 SERVICES PROVIDED IN URGENT: HCPCS

## 2025-05-14 PROCEDURE — 99214 OFFICE O/P EST MOD 30 MIN: CPT

## 2025-05-14 RX ORDER — BENZONATATE 100 MG/1
100 CAPSULE ORAL 3 TIMES DAILY PRN
Qty: 20 CAPSULE | Refills: 0 | Status: SHIPPED | OUTPATIENT
Start: 2025-05-14 | End: 2025-05-21

## 2025-05-14 NOTE — PATIENT INSTRUCTIONS
Rapid POC COVID testing negative  Preliminary chest XR read negative, final read pending  Take antibiotic as prescribed  Continue with supportive measures, OTC Tylenol, nasal decongestants, and cough suppressants (Tessalon Perles, Coricidin HBP)  Cool mist humidifiers, throat lozenges, salt gargles, honey, increased fluid intake and rest   Follow up with PCP in 3-5 days  Present to ER if symptoms worsen       If tests have been performed at [facility], our office will contact you with results if changes need to be made to the care plan discussed with you at the visit.  You can review your full results on [facility] MyChart.
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
## 2025-05-20 ENCOUNTER — EVALUATION (OUTPATIENT)
Dept: PHYSICAL THERAPY | Facility: CLINIC | Age: 75
End: 2025-05-20
Attending: ANESTHESIOLOGY
Payer: COMMERCIAL

## 2025-05-20 DIAGNOSIS — M47.812 SPONDYLOSIS OF CERVICAL SPINE: ICD-10-CM

## 2025-05-20 DIAGNOSIS — M47.816 SPONDYLOSIS OF LUMBAR SPINE: Primary | ICD-10-CM

## 2025-05-20 PROCEDURE — 97112 NEUROMUSCULAR REEDUCATION: CPT

## 2025-05-20 PROCEDURE — 97140 MANUAL THERAPY 1/> REGIONS: CPT

## 2025-05-20 PROCEDURE — 97110 THERAPEUTIC EXERCISES: CPT
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
## 2025-05-21 PROCEDURE — 97161 PT EVAL LOW COMPLEX 20 MIN: CPT

## 2025-05-28 ENCOUNTER — OFFICE VISIT (OUTPATIENT)
Dept: PHYSICAL THERAPY | Facility: CLINIC | Age: 75
End: 2025-05-28
Attending: ANESTHESIOLOGY
Payer: COMMERCIAL

## 2025-05-28 DIAGNOSIS — M47.816 SPONDYLOSIS OF LUMBAR SPINE: Primary | ICD-10-CM

## 2025-05-28 DIAGNOSIS — M47.812 SPONDYLOSIS OF CERVICAL SPINE: ICD-10-CM

## 2025-05-28 PROCEDURE — 97110 THERAPEUTIC EXERCISES: CPT

## 2025-05-28 PROCEDURE — 97112 NEUROMUSCULAR REEDUCATION: CPT

## 2025-05-28 PROCEDURE — 97140 MANUAL THERAPY 1/> REGIONS: CPT

## 2025-05-28 NOTE — PROGRESS NOTES
"Daily Note     Today's date: 2025  Patient name: Chavo Reyes  : 1950  MRN: [id number]  Referring provider: Orlando Warren MD  Dx:   Encounter Diagnosis     ICD-10-CM    1. Spondylosis of lumbar spine  M47.816       2. Spondylosis of cervical spine  M47.812           Start Time: 1300  Stop Time: 1345  Total time in clinic (min): 45 minutes    Subjective: Patient reports he hurt his neck somehow.  Patient reports he is pretty sore today.  Patient reports he has been keeping up with exercises at home.  He reports he feels worse after exercises.  Patient reports he has pain in the neck going into the shoulder blade now.       Objective: See treatment diary below      Assessment: Tolerated treatment poor. Patient would benefit from continued PT.  Patient experienced pain in several areas of the body with all exercises.  NuStep was discontinued early due to knee pain.  Patient did not tolerate any exercise, even simple AROM exercises well at all.  Patient had complaints of pain throughout entire session.  Patient required constant verbal cueing to not push past point of pain.  Patient tolerated manual therapy somewhat better today.  Patient will be progressed as tolerated.      Plan: Continue per plan of care.  Progress treatment as tolerated.       Precautions: N/A    Daily Treatment Diary:      Initial Evaluation Date:   Compliance            Visit Number 1 2           Re-Eval              Foto Captured Y                              Manual             Cerv Traction, STM, BL LAD  Not tolerated well 10' AT 10' AT gentle cervical STM, gentle BL LAD                                     Ther-Ex             Scap Ret  10x5\" 10x 5\"           Seated lumbar FLX 10x5\" 10x5\"           NuStep  L3 4' stopped due to knee pain           Cervical AROM  20x ROT, FLX EXT, SB                                                                                                                              " "  Neuro Re-Ed             TAC 20x 5\" D/C           Chin Naun 20x5\" 20x 5\"                                                                                         Ther-Act             Pulleys FLX  3'            Ball roll  10x 5\" FWD                                      Modalities                                                                              "

## 2025-06-02 ENCOUNTER — OFFICE VISIT (OUTPATIENT)
Dept: PHYSICAL THERAPY | Facility: CLINIC | Age: 75
End: 2025-06-02
Attending: ANESTHESIOLOGY
Payer: COMMERCIAL

## 2025-06-02 DIAGNOSIS — M47.816 SPONDYLOSIS OF LUMBAR SPINE: Primary | ICD-10-CM

## 2025-06-02 DIAGNOSIS — M47.812 SPONDYLOSIS OF CERVICAL SPINE: ICD-10-CM

## 2025-06-02 PROCEDURE — 97112 NEUROMUSCULAR REEDUCATION: CPT

## 2025-06-02 PROCEDURE — 97110 THERAPEUTIC EXERCISES: CPT

## 2025-06-02 PROCEDURE — 97140 MANUAL THERAPY 1/> REGIONS: CPT

## 2025-06-02 NOTE — PROGRESS NOTES
"Daily Note     Today's date: 2025  Patient name: Chavo Reyes  : 1950  MRN: [id number]  Referring provider: Orlando Warren MD  Dx:   Encounter Diagnosis     ICD-10-CM    1. Spondylosis of lumbar spine  M47.816       2. Spondylosis of cervical spine  M47.812           Start Time: 1345  Stop Time: 1430  Total time in clinic (min): 45 minutes    Subjective: Patient reports his back and neck feel about the same.      Objective: See treatment diary below      Assessment: Tolerated treatment fair. Patient would benefit from continued PT.  UEB was started as dynamic warm up as Nu Step was not tolerated last session.  Patient required multiple adjustments on UEB to decrease back discomfort.  Patient reports compliance with HEP.  Patient requires cueing to avoid pushing past pain throughout session.  Patient had complaints of pain with most exercises.  Patient had poor tolerance to gentle manual therapy.  Will continue to progress as tolerated.      Plan: Continue per plan of care.  Progress treatment as tolerated.       Precautions: N/A    Daily Treatment Diary:      Initial Evaluation Date:   Compliance           Visit Number 1 2 3          Re-Eval              Foto Captured Y                             Manual             Cerv Traction, STM, BL LAD  Not tolerated well 10' AT 10' AT gentle cervical STM, gentle BL LAD 10' AT                                    Ther-Ex             Scap Ret  10x5\" 10x 5\" 20x 5\"          Seated lumbar FLX 10x5\" 10x5\" 10x 5\"          NuStep  L3 4' stopped due to knee pain           Cervical AROM  20x ROT, FLX EXT, SB 20x ROT, FLX, EXT, SB                                                                                                                               Neuro Re-Ed             TAC 20x 5\" D/C           Chin Tuck 20x5\" 20x 5\" 20x5\"          UEB   3' ea                                                                           Ther-Act         " "    Pulleys FLX  3'  3'           Ball roll  10x 5\" FWD 10x 5\" FWD                                     Modalities                                                                                "

## 2025-06-03 ENCOUNTER — OFFICE VISIT (OUTPATIENT)
Dept: PHYSICAL THERAPY | Facility: CLINIC | Age: 75
End: 2025-06-03
Attending: ANESTHESIOLOGY
Payer: COMMERCIAL

## 2025-06-03 DIAGNOSIS — M47.812 SPONDYLOSIS OF CERVICAL SPINE: ICD-10-CM

## 2025-06-03 DIAGNOSIS — M47.816 SPONDYLOSIS OF LUMBAR SPINE: Primary | ICD-10-CM

## 2025-06-03 PROCEDURE — 97112 NEUROMUSCULAR REEDUCATION: CPT

## 2025-06-03 PROCEDURE — 97140 MANUAL THERAPY 1/> REGIONS: CPT

## 2025-06-03 PROCEDURE — 97110 THERAPEUTIC EXERCISES: CPT

## 2025-06-03 NOTE — PROGRESS NOTES
"Daily Note     Today's date: 6/3/2025  Patient name: Chavo Reyes  : 1950  MRN: [id number]  Referring provider: Orlando Warren MD  Dx:   Encounter Diagnosis     ICD-10-CM    1. Spondylosis of lumbar spine  M47.816       2. Spondylosis of cervical spine  M47.812                      Subjective: Patient reports 7/10 low back and neck pain today. He notes neck is slightly worse.      Objective: See treatment diary below      Assessment: Tolerated treatment fair. Gentle pressure utilized with STM in order to tolerate. Patient required moderate verbal cues to perform exercises with appropriate technique and intensity. He demonstrates significant tightness of B HS when attempted self and manual SKTC- added seated HS stretch with good tolerance. Patient would benefit from continued PT to increase trunk and neck  mobility and core strength for improved function in daily activities.       Plan: Continue per plan of care.      Precautions: N/A    Daily Treatment Diary:      Initial Evaluation Date:   Compliance 5/20 5/28 6/2 6/3         Visit Number 1 2 3 4         Re-Eval              Foto Captured Y                   5/20 5/28 6/2 6/3         Manual             Cerv Traction, STM, BL LAD  Not tolerated well 10' AT 10' AT gentle cervical STM, gentle BL LAD 10' AT C/S STM 10m AS                                   Ther-Ex             Scap Ret  10x5\" 10x 5\" 20x 5\" 20x 5\"         Seated lumbar FLX 10x5\" 10x5\" 10x 5\"          NuStep  L3 4' stopped due to knee pain           Cervical AROM  20x ROT, FLX EXT, SB 20x ROT, FLX, EXT, SB 20x ea rot/SB/ext         LTR    10x         SKTC c towel    Attempted          Seated HS stretch c stool    10\"x5                                                                                                    Neuro Re-Ed             TAC 20x 5\" D/C           Chin Tuck 20x5\" 20x 5\" 20x5\" 3\"x20         UEB   3' ea 3'/3'                                                                   " "       Ther-Act             Pulleys FLX  3'  3'           Ball roll  10x 5\" FWD 10x 5\" FWD 10x 5\" fwd                                    Modalities                              P L/S and C/S 10m post                                                    "

## 2025-06-05 ENCOUNTER — PREP FOR PROCEDURE (OUTPATIENT)
Dept: PAIN MEDICINE | Facility: CLINIC | Age: 75
End: 2025-06-05

## 2025-06-05 ENCOUNTER — OFFICE VISIT (OUTPATIENT)
Dept: PAIN MEDICINE | Facility: CLINIC | Age: 75
End: 2025-06-05
Payer: COMMERCIAL

## 2025-06-05 VITALS — HEIGHT: 70 IN | BODY MASS INDEX: 28.35 KG/M2 | WEIGHT: 198 LBS

## 2025-06-05 DIAGNOSIS — M47.816 LUMBAR SPONDYLOSIS: Primary | ICD-10-CM

## 2025-06-05 DIAGNOSIS — M47.812 CERVICAL SPONDYLOSIS: Primary | ICD-10-CM

## 2025-06-05 DIAGNOSIS — M47.812 CERVICAL SPONDYLOSIS: ICD-10-CM

## 2025-06-05 PROCEDURE — 99214 OFFICE O/P EST MOD 30 MIN: CPT | Performed by: ANESTHESIOLOGY

## 2025-06-05 PROCEDURE — G2211 COMPLEX E/M VISIT ADD ON: HCPCS | Performed by: ANESTHESIOLOGY

## 2025-06-05 NOTE — H&P (VIEW-ONLY)
Name: Chavo Reyes      : 1950      MRN: [id number]  Encounter Provider: Orlando Warren MD  Encounter Date: 2025   Encounter department: [facility]  :  Assessment & Plan  Lumbar spondylosis    Cervical spondylosis    Axial neck and low back pain described primarily arthritic features. Achy, nagging, indolent crampy and throbbing pain worse with neck and low back extension and lateral rotation. +facet loading maneuvers, ttp over cervical and lumbar paraspinal muscles. Prominent cervical and lumbar facet arthropathy noted throughout MRI imaging studies. Reasonable at this time to trial medial branch blocks to target site of cervical and lumbar facet mediated pain and pursue radiofrequency ablation if successful given patient has had only modest benefit with PT. Risks, benefits and alternatives of procedure in conjunction with multimodal pain therapy plan thoroughly discussed with patient.  Questions answered to patient's satisfaction.     -bilateral c3, c4, c5 medial branch blocks #1  -has been participant with formal physical therapy for cervical and lumbar facet arthropathy; Physician directed home exercise plan as per [employer] demonstrated and handouts provided that patient plans to participate with for 1 hour, twice a week for the next 6 weeks.     Complete risks and benefits including bleeding, infection, tissue reaction, nerve injury and allergic reaction were discussed. The approach was demonstrated using models and literature was provided. Verbal and written consent was obtained.    My impressions and treatment recommendations were discussed in detail with the patient who verbalized understanding and had no further questions.  Discharge instructions were provided. I personally saw and examined the patient and I agree with the above discussed plan of care.    History of Present Illness     Chavo Reyes is a 74 y.o. male who presents for a follow up  "office visit in regards to Follow-up. The patient’s current symptoms include chronic neck and low back pain described primarily as arthritic in nature.  He describes 8-9/10 neck and low back pain that is worse in the mornings and worse at the end of the day.  The pain is characterized by achy, nagging, indolent, crampy, stabbing pain in his axial neck and low back.  The patient describes that the pain is worse with standing for long periods of time on hard surfaces as well as with walking.  The patient is a very active individual and feels as though this pain compromises his participation with independent activities of daily living. The pain can be debilitating at times and contribute to significant disability, compromising overall activity and independent activities of daily living. He has attempted formal physical therapy with modest benefit.   Medications the patient has tried in the past include nsaids, tylenol. He describes no radicular symptoms and has good strength.  He denies any weakness numbness or paresthesias. The patient denies any bowel or bladder dysfunction, saddle anesthesia or gait instability as well.       Review of Systems   Constitutional:  Positive for activity change.   HENT: Negative.     Eyes: Negative.    Respiratory: Negative.     Cardiovascular: Negative.    Gastrointestinal: Negative.    Endocrine: Negative.    Genitourinary: Negative.    Musculoskeletal:  Positive for arthralgias, back pain, gait problem, myalgias, neck pain and neck stiffness.   Skin: Negative.    Allergic/Immunologic: Negative.    Neurological:  Negative for weakness and numbness.   Hematological: Negative.    Psychiatric/Behavioral: Negative.     All other systems reviewed and are negative.      Medical History Reviewed by provider this encounter:     .     Objective   Ht 5' 10\" (1.778 m)   Wt 89.8 kg (198 lb)   BMI 28.41 kg/m²         Physical Exam  Constitutional: normal, well developed, well nourished, alert, " in no distress and non-toxic and no overt pain behavior. and overweight  Eyes: anicteric  HEENT: grossly intact  Neck: supple, symmetric, trachea midline and no masses   Pulmonary:even and unlabored  Cardiovascular:No edema or pitting edema present  Skin:Normal without rashes or lesions and well hydrated  Psychiatric:Mood and affect appropriate  Neurologic:Cranial Nerves II-XII grossly intact Sensation grossly intact; no clonus negative valenzuela's. Reflexes 2+ and brisk. SLR negative bilaterally. Spurling's maneuver positive left sided  Musculoskeletal:normal gait. 5/5 strength bilaterally with AROM in upper and lower extremities. Difficulty with normal heel toe and tip toe walking. Significant pain with cervical and lumbar facet loading bilaterally and with lateral spine rotation, ttp over cervical and lumbar paraspinal muscles. Negative bridget's test, negative gaenslen's negative SIJ loading bilaterally.    Radiology Results Review: I personally reviewed the following image studies in PACS and associated radiology reports: MRI spine. My interpretation of the radiology images/reports is: cervical and lumbar spondylosis without significant nerve root compression.    Administrative Statements   I have spent a total time of 30 minutes in caring for this patient on the day of the visit/encounter including Diagnostic results, Prognosis, Risks and benefits of tx options, Instructions for management, Patient and family education, Importance of tx compliance, Risk factor reductions, Impressions, Counseling / Coordination of care, Documenting in the medical record, Reviewing/placing orders in the medical record (including tests, medications, and/or procedures), Obtaining or reviewing history  , and Communicating with other healthcare professionals .

## 2025-06-05 NOTE — PROGRESS NOTES
Name: Chavo Reyes      : 1950      MRN: [id number]  Encounter Provider: Orlando Warren MD  Encounter Date: 2025   Encounter department: [facility] [place]  :  Assessment & Plan  Lumbar spondylosis    Cervical spondylosis    Axial neck and low back pain described primarily arthritic features. Achy, nagging, indolent crampy and throbbing pain worse with neck and low back extension and lateral rotation. +facet loading maneuvers, ttp over cervical and lumbar paraspinal muscles. Prominent cervical and lumbar facet arthropathy noted throughout MRI imaging studies. Reasonable at this time to trial medial branch blocks to target site of cervical and lumbar facet mediated pain and pursue radiofrequency ablation if successful given patient has had only modest benefit with PT. Risks, benefits and alternatives of procedure in conjunction with multimodal pain therapy plan thoroughly discussed with patient.  Questions answered to patient's satisfaction.     -bilateral c3, c4, c5 medial branch blocks #1  -has been participant with formal physical therapy for cervical and lumbar facet arthropathy; Physician directed home exercise plan as per [employer] demonstrated and handouts provided that patient plans to participate with for 1 hour, twice a week for the next 6 weeks.     Complete risks and benefits including bleeding, infection, tissue reaction, nerve injury and allergic reaction were discussed. The approach was demonstrated using models and literature was provided. Verbal and written consent was obtained.    My impressions and treatment recommendations were discussed in detail with the patient who verbalized understanding and had no further questions.  Discharge instructions were provided. I personally saw and examined the patient and I agree with the above discussed plan of care.    History of Present Illness     Chavo Reyes is a 74 y.o. male who presents for a follow up  "office visit in regards to Follow-up. The patient’s current symptoms include chronic neck and low back pain described primarily as arthritic in nature.  He describes 8-9/10 neck and low back pain that is worse in the mornings and worse at the end of the day.  The pain is characterized by achy, nagging, indolent, crampy, stabbing pain in his axial neck and low back.  The patient describes that the pain is worse with standing for long periods of time on hard surfaces as well as with walking.  The patient is a very active individual and feels as though this pain compromises his participation with independent activities of daily living. The pain can be debilitating at times and contribute to significant disability, compromising overall activity and independent activities of daily living. He has attempted formal physical therapy with modest benefit.   Medications the patient has tried in the past include nsaids, tylenol. He describes no radicular symptoms and has good strength.  He denies any weakness numbness or paresthesias. The patient denies any bowel or bladder dysfunction, saddle anesthesia or gait instability as well.       Review of Systems   Constitutional:  Positive for activity change.   HENT: Negative.     Eyes: Negative.    Respiratory: Negative.     Cardiovascular: Negative.    Gastrointestinal: Negative.    Endocrine: Negative.    Genitourinary: Negative.    Musculoskeletal:  Positive for arthralgias, back pain, gait problem, myalgias, neck pain and neck stiffness.   Skin: Negative.    Allergic/Immunologic: Negative.    Neurological:  Negative for weakness and numbness.   Hematological: Negative.    Psychiatric/Behavioral: Negative.     All other systems reviewed and are negative.      Medical History Reviewed by provider this encounter:     .     Objective   Ht 5' 10\" (1.778 m)   Wt 89.8 kg (198 lb)   BMI 28.41 kg/m²         Physical Exam  Constitutional: normal, well developed, well nourished, alert, " in no distress and non-toxic and no overt pain behavior. and overweight  Eyes: anicteric  HEENT: grossly intact  Neck: supple, symmetric, trachea midline and no masses   Pulmonary:even and unlabored  Cardiovascular:No edema or pitting edema present  Skin:Normal without rashes or lesions and well hydrated  Psychiatric:Mood and affect appropriate  Neurologic:Cranial Nerves II-XII grossly intact Sensation grossly intact; no clonus negative valenzuela's. Reflexes 2+ and brisk. SLR negative bilaterally. Spurling's maneuver positive left sided  Musculoskeletal:normal gait. 5/5 strength bilaterally with AROM in upper and lower extremities. Difficulty with normal heel toe and tip toe walking. Significant pain with cervical and lumbar facet loading bilaterally and with lateral spine rotation, ttp over cervical and lumbar paraspinal muscles. Negative bridget's test, negative gaenslen's negative SIJ loading bilaterally.    Radiology Results Review: I personally reviewed the following image studies in PACS and associated radiology reports: MRI spine. My interpretation of the radiology images/reports is: cervical and lumbar spondylosis without significant nerve root compression.    Administrative Statements   I have spent a total time of 30 minutes in caring for this patient on the day of the visit/encounter including Diagnostic results, Prognosis, Risks and benefits of tx options, Instructions for management, Patient and family education, Importance of tx compliance, Risk factor reductions, Impressions, Counseling / Coordination of care, Documenting in the medical record, Reviewing/placing orders in the medical record (including tests, medications, and/or procedures), Obtaining or reviewing history  , and Communicating with other healthcare professionals .

## 2025-06-06 NOTE — PATIENT INSTRUCTIONS
Patient Education     Neck Pain Exercises   About this topic   The neck or cervical spine has 7 spinal bones that run from the base of your skull to the upper back. These spinal bones have discs in between them. Discs act as shock absorbers. Ligaments are strong bands of tissue that hold the bones together. Many muscles surround and attach on these bones. Nerves come off of the spinal cord and exit out of small spaces in between the spinal bones. You can have neck pain if any of these are injured or damaged. Exercises may help to make this problem better.  General   Before starting with a program, ask your doctor if you are healthy enough to do these exercises. Your doctor may have you work with a  or physical therapist to make a safe exercise program to meet your needs. You should not do the exercises if they cause sharp pains, if you feel dizzy, or if you have vision changes.  Stretching Exercises   Stretching exercises keep your muscles flexible. They also stop them from getting tight. Start by doing each of these stretches 2 to 3 times. In order for your body to make changes, you will need to hold these stretches for 20 to 30 seconds. Try to do the stretches 2 to 3 times each day. Do all exercises slowly.  Passive neck stretches:  Put your left hand on top of your head. Your other arm can be at your side or behind your back. Pull your head toward your left shoulder until you feel a gentle stretch on the right side of your neck. Repeat on the other side using your other hand.  Also, try this stretch by pulling in a diagonal direction. With your left hand on top of your head, pull your head down towards the direction of your left knee. You should feel this stretch toward the back on the right side of your neck. Repeat on the other side.  Active neck stretches:  Neck front-to-back motion ? Look down to the floor until you feel a stretch in the back of your neck. Hold. Next, look up to the ceiling until you  feel a stretch in the front of your neck. Hold.  Neck side-to-side motion ? Tilt your head to the side and bring your ear to your shoulder until you feel a stretch on the other side of your neck. Hold. Next, tilt your head to the other side until you feel a stretch. Hold.  Neck turning ? Turn only your head and look over your left shoulder until you feel stretching in the right side of your neck. Hold. Now turn only your head and look over your right shoulder until you feel a stretch in the left side of your neck. Hold.  Scalene stretches ? Grasp your head with the hand opposite the side you want to stretch. Pull your head to the side until you feel a stretch. Now, slowly turn your head so your chin is pointed upwards.  Chin tucks ? Stand straight or lie down on your back. Tuck your chin in and lengthen the back of your neck. Return to the starting position and repeat. It may help to stand up against a wall during this exercise. Try gently pushing your chin with two fingers while trying to flatten your neck against the wall. If you do this exercise lying down, try using a small rolled up washcloth under your neck. Push down into the washcloth when tucking in your chin.  Strengthening Exercises   Strengthening exercises keep your muscles firm and strong. Start by repeating each exercise 2 to 3 times. Work up to doing each exercise 10 times. Try to do the exercises 2 to 3 times each day. Hold each exercise for 3 to 5 seconds. Do all exercises slowly.  Shoulder blade squeezes ? Pinch your shoulder blades together on your upper back and hold 3 to 5 seconds. Relax.             What will the results be?   Less pain and stiffness  Better range of motion  Increased strength  Help you heal faster after an injury or surgery  Increase blood flow to a body part  Help you feel better and more relaxed  Give you more energy  More toned looking muscles  Better posture  Easier to do daily activities  Helpful tips   Stay active and  work out to keep your muscles strong and flexible.  Be sure you do not hold your breath when exercising. This can raise your blood pressure. If you tend to hold your breath, try counting out loud when exercising. If any exercise bothers you, stop right away.  Try swinging your arms at an easy pace for a few minutes to warm up your muscles. Do this again after exercising.  Doing exercises before a meal may be a good way to get into a routine.  Exercise may be slightly uncomfortable, but you should not have sharp pains. If you do get sharp pains, stop what you are doing. If the sharp pains continue, call your doctor.  Last Reviewed Date   2020-03-10  Consumer Information Use and Disclaimer   This generalized information is a limited summary of diagnosis, treatment, and/or medication information. It is not meant to be comprehensive and should be used as a tool to help the user understand and/or assess potential diagnostic and treatment options. It does NOT include all information about conditions, treatments, medications, side effects, or risks that may apply to a specific patient. It is not intended to be medical advice or a substitute for the medical advice, diagnosis, or treatment of a health care provider based on the health care provider's examination and assessment of a patient’s specific and unique circumstances. Patients must speak with a health care provider for complete information about their health, medical questions, and treatment options, including any risks or benefits regarding use of medications. This information does not endorse any treatments or medications as safe, effective, or approved for treating a specific patient. UpToDate, Inc. and its affiliates disclaim any warranty or liability relating to this information or the use thereof. The use of this information is governed by the Terms of Use, available at https://www.[employer].com/en/know/clinical-effectiveness-terms   Copyright   Copyright ©  "2024 UpToDate, Inc. and its affiliates and/or licensors. All rights reserved.    Written by the doctors and editors at [employer]   Why do I need to do back exercises? -- Back exercises can help ease back pain and might help prevent future back pain. Long term, it is important to strengthen the muscles in your lower back, buttocks, and belly. These are your \"core muscles.\" Stretching exercises are also important to keep your muscles flexible.  Below are some stretching and strengthening exercises that might help you. Other forms of movement can help ease or prevent back pain, too. For example, some people like to walk, do aerobic exercise, or do yoga or matthew chi. The most important thing is to move your body. Your doctor, nurse, or physical therapist can help you find different types of activity that work for you.  What stretching exercises should I do? -- Below are some examples of stretching exercises. Warm up your muscles before stretching to help prevent injury. To warm up, you can walk, jog, or cycle for a few minutes.  Start by repeating each of these stretches 2 to 3 times. Try to hold each stretch for 5 to 10 seconds, and try to do the stretches 2 to 3 times each day. Breathe slowly and deeply as you do the exercises. Never bounce when doing stretches.   Cat-cow stretch (figure 1) - Start on all fours on the floor, with your hands under your shoulders, knees under your hips, and your back flat. First, tuck your chin and tighten your stomach muscles as you round your back (like a \"cat\"). Hold the stretch for about 10 seconds. Rest for a few seconds as you flatten your back. Next, lift your chin and let your belly and lower back sink toward the floor (like a \"cow\"). Hold the stretch for about 10 seconds.   Single knee-to-chest stretches (figure 2) ? While lying on your back, bend your knees with your feet flat on the floor. Pull 1 knee toward your chest until you feel a stretch in your lower back and buttock " area. Lower, and repeat with the other knee. If you have knee problems, pull your knee up by grabbing the back of your thigh instead of the front of your knee. You can also do this exercise by grabbing both knees at the same time.   Lower trunk rotations (figure 3) ? While lying on your back, bend your knees with your feet flat on the floor. Keep your knees and ankles together, and then drop them to 1 side. Keep both of your shoulders touching the floor until you feel a stretch in the muscles at the side of the back. Repeat on the other side.   Lower back stretches seated (figure 4) ? Sit in a chair with your feet spread about shoulder width apart. Then, lean forward until you feel a stretch in your lower back.  What strengthening exercises should I do? -- Below are some examples of strengthening exercises.  Start by doing each exercise 2 to 3 times. Work up to doing each exercise 10 times. Hold each exercise for 3 to 5 seconds, and try to do the exercises 2 to 3 times each day. Do all exercises slowly.   Shoulder blade squeezes (figure 5) ? Pinch your shoulder blades together on your upper back, and hold 3 to 5 seconds. You can also do these 1 side at a time. Sit with good posture, and make sure that your shoulders do not rise up when you do this exercise. Relax.   Pelvic tilts (figure 6) ? Lie on your back with your knees bent and feet flat on the floor. Tighten your stomach muscles, and press your lower back down to the floor. Relax. You should be able to breathe comfortably during this exercise.   Hip lifts (figure 7) ? Lie on your back with your knees bent and feet flat on the floor. Tighten your stomach muscles, keep your back flat, and lift your buttocks off of the floor. Relax. You should feel this in your buttocks, not in your lower back.  What else should I know?    Exercise, including stretching, might be slightly uncomfortable. But you should not have sharp or severe pain. If you do get severe pain, stop  "what you are doing. If severe pain continues, call your doctor or nurse.   Do not hold your breath when exercising. If you tend to hold your breath, try counting out loud when exercising. If any exercise bothers you, stop right away.   Always warm up before exercising. Warm muscles stretch much easier than cool muscles. Stretching cool muscles can lead to injury.   Doing exercises before a meal can be a good way to get into a routine.  All topics are updated as new evidence becomes available and our peer review process is complete.  This topic retrieved from [employer] on: Apr 03, 2024.  Topic 734651 Version 2.0  Release: 32.2.4 - C32.92  © 2024 UpToDate, Inc. and/or its affiliates. All rights reserved.  figure 1: Cat-cow stretch     Start on all fours on the floor, with hands under your shoulders, knees under your hips, and your back flat. First, tuck your chin and tighten your stomach muscles as you round your back (like a \"cat\"). Hold the stretch for about 10 seconds. Rest for a few seconds as you flatten your back. Next, lift your chin and let your belly and lower back sink toward the floor (like a \"cow\"). Hold the stretch for about 10 seconds.  Graphic [id number] Version 1.0  figure 2: Single knee-to-chest stretch     Lie on your back, bend your knees, and have your feet flat on the floor. Pull 1 knee toward your chest until you feel a stretch in your lower back and buttock area. Repeat with the other knee. If you have knee problems, pull your knee up by grabbing the back of your thigh instead of the front of your knee. You can also do this exercise by grabbing both knees at the same time.  Graphic [id number] Version 1.0  figure 3: Lower trunk rotation     While lying on your back, bend your knees and have your feet flat on the floor. Keep your legs together and then drop them to 1 side. Keep both of your shoulders touching the floor until you feel a stretch in the muscles at the side of the back. Repeat on the other " side.  Graphic [id number] Version 1.0  figure 4: Lower back stretch     Sit in a chair with your feet spread about shoulder width apart. Then, lean forward until you feel a stretch in your lower back.  Graphic [id number] Version 1.0  figure 5: Shoulder blade squeezes     Pinch your shoulder blades together on your upper back and hold for 3 to 5 seconds. Make sure that you are sitting with good posture and that your shoulders do not raise up when you do this exercise. Relax.  Graphic [id number] Version 1.0  figure 6: Pelvic tilts     Lie on your back with your knees bent and feet flat on the floor. Tighten your stomach muscles and press your lower back down to the floor. Relax.  Graphic [id number] Version 1.0  figure 7: Hip lifts     Lie on your back with your knees bent and feet flat on the floor. Tighten your stomach muscles and lift your buttocks off of the floor. Relax.  Graphic [id number] Version 1.0  Consumer Information Use and Disclaimer   Disclaimer: This generalized information is a limited summary of diagnosis, treatment, and/or medication information. It is not meant to be comprehensive and should be used as a tool to help the user understand and/or assess potential diagnostic and treatment options. It does NOT include all information about conditions, treatments, medications, side effects, or risks that may apply to a specific patient. It is not intended to be medical advice or a substitute for the medical advice, diagnosis, or treatment of a health care provider based on the health care provider's examination and assessment of a patient's specific and unique circumstances. Patients must speak with a health care provider for complete information about their health, medical questions, and treatment options, including any risks or benefits regarding use of medications. This information does not endorse any treatments or medications as safe, effective, or approved for treating a specific patient. UpToDate, Inc. and its  affiliates disclaim any warranty or liability relating to this information or the use thereof.The use of this information is governed by the Terms of Use, available at https://www.w[employer].com/en/know/clinical-effectiveness-terms. 2024© [employer] and its affiliates and/or licensors. All rights reserved.  Copyright   © 2024 [employer] and/or its affiliates. All rights reserved.

## 2025-06-10 ENCOUNTER — OFFICE VISIT (OUTPATIENT)
Dept: PHYSICAL THERAPY | Facility: CLINIC | Age: 75
End: 2025-06-10
Attending: ANESTHESIOLOGY
Payer: COMMERCIAL

## 2025-06-10 DIAGNOSIS — M47.812 SPONDYLOSIS OF CERVICAL SPINE: ICD-10-CM

## 2025-06-10 DIAGNOSIS — M47.816 SPONDYLOSIS OF LUMBAR SPINE: Primary | ICD-10-CM

## 2025-06-10 PROCEDURE — 97110 THERAPEUTIC EXERCISES: CPT

## 2025-06-10 PROCEDURE — 97140 MANUAL THERAPY 1/> REGIONS: CPT

## 2025-06-10 PROCEDURE — 97112 NEUROMUSCULAR REEDUCATION: CPT

## 2025-06-10 NOTE — PROGRESS NOTES
"Daily Note     Today's date: 6/10/2025  Patient name: Chavo Reyes  : 1950  MRN: [id number]  Referring provider: Orlando Warren MD  Dx:   Encounter Diagnosis     ICD-10-CM    1. Spondylosis of lumbar spine  M47.816       2. Spondylosis of cervical spine  M47.812           Start Time: 1345  Stop Time: 1430  Total time in clinic (min): 45 minutes    Subjective: Patient reports no change in his pain overall.  Patient reports he is a little more sore today due to poor sleep last night.  Patient will going for another injection on  for the neck.      Objective: See treatment diary below      Assessment: Tolerated treatment well. Patient would benefit from continued PT.  PT performed manual therapy consisting of cervical STM and lumbar BL LAD to improve mobility and decrease pain in neck and low back.  Patient continued TherEx to improve cervical and lumbar mobility. Patient continued Neuro Muscular Re-Ed to improve core stability and control.  TherAct was continued in order to improve reaching and bending.  Patient would benefit from skilled PT to address functional deficits and pain.       Plan: Continue per plan of care.  Progress treatment as tolerated.       Precautions: N/A    Daily Treatment Diary:      Initial Evaluation Date:   Compliance 5/20 5/28 6/2 6/3 6/10        Visit Number 1 2 3 4 5        Re-Eval              Foto Captured Y    Y               5/20 5/28 6/2 6/3 6/10        Manual             Cerv Traction, STM, BL LAD  Not tolerated well 10' AT 10' AT gentle cervical STM, gentle BL LAD 10' AT C/S STM 10m AS 10' AT                                  Ther-Ex             Scap Ret  10x5\" 10x 5\" 20x 5\" 20x 5\" 20x 5\"        Seated lumbar FLX 10x5\" 10x5\" 10x 5\"          NuStep  L3 4' stopped due to knee pain           Cervical AROM  20x ROT, FLX EXT, SB 20x ROT, FLX, EXT, SB 20x ea rot/SB/ext 20x ROT, FLX, EXT, SB        LTR    10x 10x         SKTC c towel    Attempted          Seated HS " "stretch c stool    10\"x5 10\"x5        Supine March     10x                                                                                       Neuro Re-Ed             TAC 20x 5\" D/C           Chin Tuck 20x5\" 20x 5\" 20x5\" 3\"x20 20x 5\"        UEB   3' ea 3'/3' 3' ea                                                                          Ther-Act             Pulleys FLX  3'  3'   3'        Ball roll  10x 5\" FWD 10x 5\" FWD 10x 5\" fwd 10x 5\" FWD                                   Modalities                              MHP L/S and C/S 10m post                                                      "

## 2025-06-12 ENCOUNTER — OFFICE VISIT (OUTPATIENT)
Dept: PHYSICAL THERAPY | Facility: CLINIC | Age: 75
End: 2025-06-12
Attending: ANESTHESIOLOGY
Payer: COMMERCIAL

## 2025-06-12 DIAGNOSIS — M47.812 SPONDYLOSIS OF CERVICAL SPINE: ICD-10-CM

## 2025-06-12 DIAGNOSIS — M47.816 SPONDYLOSIS OF LUMBAR SPINE: Primary | ICD-10-CM

## 2025-06-12 PROCEDURE — 97140 MANUAL THERAPY 1/> REGIONS: CPT

## 2025-06-12 PROCEDURE — 97112 NEUROMUSCULAR REEDUCATION: CPT

## 2025-06-12 PROCEDURE — 97110 THERAPEUTIC EXERCISES: CPT

## 2025-06-12 NOTE — PROGRESS NOTES
"Daily Note     Today's date: 2025  Patient name: Chavo Reyes  : 1950  MRN: [id number]  Referring provider: Orlando Warren MD  Dx:   Encounter Diagnosis     ICD-10-CM    1. Spondylosis of lumbar spine  M47.816       2. Spondylosis of cervical spine  M47.812           Start Time: 1345  Stop Time: 1430  Total time in clinic (min): 45 minutes    Subjective: Patient reports he didn't get much sleep again last night due to neck pain.  Patient reports he feels about the same otherwise.      Objective: See treatment diary below      Assessment: Tolerated treatment well. Patient would benefit from continued PT.  Patient tolerated exercises slightly better today.   Patient continued all exercises as outlined in flow sheet in order to improve spinal mobility an posture.  Patient tolerated gentle manual therapy.  Patient continues to have significant pain in the low back and neck.  Will continue to progress patient as tolerated.  Knee pain also limits tolerance to therapy.      Plan: Continue per plan of care.  Progress treatment as tolerated.       Precautions: N/A    Daily Treatment Diary:      Initial Evaluation Date:   Compliance 5/20 5/28 6/2 6/3 6/10 6/12       Visit Number 1 2 3 4 5 6       Re-Eval              Foto Captured Y    Y               5/20 5/28 6/2 6/3 6/10 6/12       Manual             Cerv Traction, STM, BL LAD  Not tolerated well 10' AT 10' AT gentle cervical STM, gentle BL LAD 10' AT C/S STM 10m AS 10' AT 10' AT                                 Ther-Ex             Scap Ret  10x5\" 10x 5\" 20x 5\" 20x 5\" 20x 5\" 20x 5\"       Seated lumbar FLX 10x5\" 10x5\" 10x 5\"          NuStep  L3 4' stopped due to knee pain           Cervical AROM  20x ROT, FLX EXT, SB 20x ROT, FLX, EXT, SB 20x ea rot/SB/ext 20x ROT, FLX, EXT, SB 20x ROT, FLX, EXT, SB       LTR    10x 10x  10x       SKTC c towel    Attempted          Seated HS stretch c stool    10\"x5 10\"x5 10\"x5       Supine March     10x  10x           " "                                                                           Neuro Re-Ed             TAC 20x 5\" D/C           Chin Tuck 20x5\" 20x 5\" 20x5\" 3\"x20 20x 5\" 20x 5\"       UEB   3' ea 3'/3' 3' ea  3' ea       Wall Push Up      10x 5\"                                                           Ther-Act             Pulleys FLX  3'  3'   3' 3'       Ball roll  10x 5\" FWD 10x 5\" FWD 10x 5\" fwd 10x 5\" FWD 10x 5\" FWD                                  Modalities                              MHP L/S and C/S 10m post                                                        "

## 2025-06-17 ENCOUNTER — APPOINTMENT (OUTPATIENT)
Dept: PHYSICAL THERAPY | Facility: CLINIC | Age: 75
End: 2025-06-17
Attending: ANESTHESIOLOGY
Payer: COMMERCIAL

## 2025-06-19 ENCOUNTER — APPOINTMENT (OUTPATIENT)
Dept: PHYSICAL THERAPY | Facility: CLINIC | Age: 75
End: 2025-06-19
Attending: ANESTHESIOLOGY
Payer: COMMERCIAL

## 2025-06-23 ENCOUNTER — APPOINTMENT (OUTPATIENT)
Dept: PHYSICAL THERAPY | Facility: CLINIC | Age: 75
End: 2025-06-23
Attending: ANESTHESIOLOGY
Payer: COMMERCIAL

## 2025-06-24 ENCOUNTER — HOSPITAL ENCOUNTER (OUTPATIENT)
Dept: INTERVENTIONAL RADIOLOGY/VASCULAR | Facility: HOSPITAL | Age: 75
Discharge: HOME | End: 2025-06-24
Attending: ANESTHESIOLOGY
Payer: COMMERCIAL

## 2025-06-24 ENCOUNTER — APPOINTMENT (OUTPATIENT)
Dept: PHYSICAL THERAPY | Facility: CLINIC | Age: 75
End: 2025-06-24
Attending: ANESTHESIOLOGY
Payer: COMMERCIAL

## 2025-06-24 VITALS
HEART RATE: 59 BPM | TEMPERATURE: 98 F | BODY MASS INDEX: 28.35 KG/M2 | RESPIRATION RATE: 18 BRPM | DIASTOLIC BLOOD PRESSURE: 72 MMHG | SYSTOLIC BLOOD PRESSURE: 153 MMHG | OXYGEN SATURATION: 98 % | WEIGHT: 198 LBS | HEIGHT: 70 IN

## 2025-06-24 DIAGNOSIS — M47.812 CERVICAL SPONDYLOSIS: ICD-10-CM

## 2025-06-24 PROCEDURE — 64490 INJ PARAVERT F JNT C/T 1 LEV: CPT | Performed by: ANESTHESIOLOGY

## 2025-06-24 PROCEDURE — 64491 INJ PARAVERT F JNT C/T 2 LEV: CPT | Performed by: ANESTHESIOLOGY

## 2025-06-24 RX ORDER — LIDOCAINE HYDROCHLORIDE 10 MG/ML
INJECTION, SOLUTION EPIDURAL; INFILTRATION; INTRACAUDAL; PERINEURAL AS NEEDED
Status: COMPLETED | OUTPATIENT
Start: 2025-06-24 | End: 2025-06-24

## 2025-06-24 RX ORDER — DEXAMETHASONE SODIUM PHOSPHATE 10 MG/ML
INJECTION, SOLUTION INTRAMUSCULAR; INTRAVENOUS AS NEEDED
Status: COMPLETED | OUTPATIENT
Start: 2025-06-24 | End: 2025-06-24

## 2025-06-24 RX ORDER — BUPIVACAINE HYDROCHLORIDE 2.5 MG/ML
INJECTION, SOLUTION EPIDURAL; INFILTRATION; INTRACAUDAL; PERINEURAL AS NEEDED
Status: COMPLETED | OUTPATIENT
Start: 2025-06-24 | End: 2025-06-24

## 2025-06-24 RX ADMIN — BUPIVACAINE HYDROCHLORIDE 5 ML: 2.5 INJECTION, SOLUTION EPIDURAL; INFILTRATION; INTRACAUDAL; PERINEURAL at 13:09

## 2025-06-24 RX ADMIN — LIDOCAINE HYDROCHLORIDE 10 ML: 10 INJECTION, SOLUTION EPIDURAL; INFILTRATION; INTRACAUDAL; PERINEURAL at 13:09

## 2025-06-24 RX ADMIN — DEXAMETHASONE SODIUM PHOSPHATE 10 MG: 10 INJECTION, SOLUTION INTRAMUSCULAR; INTRAVENOUS at 13:09

## 2025-06-24 NOTE — DISCHARGE INSTR - AVS FIRST PAGE
YOUR 2 WEEK FOLLOW UP HAS BEEN SCHEDULED; IF YOU WISH TO CHANGE THE FOLLOW UP, PLEASE CALL THE [facility] AT [facility]: [phone]    MEDIAL BRANCH BLOCK DISCHARGE INSTRUCTIONS      ACTIVITY  Please do activities that will bring the normal pain that we are rating. For example, if vacuuming or walking increases the painm do that. Cory twill give the most accurate response to the diary.  You may shower, but no tub baths today, or applied heat.  Avoid driving/operating heavy machinery for 24 hours    CARE OF THE INJECTION SITE  This area may be numb for several hours after the injection.  Notify the [facility] if you have any of the following: redness, drainage, swelling or fever above 100°F.    SPECIAL INSTRUCTIONS  Please return the MBB diary to our office by mail, fax, or drop it off.    MEDICATIONS  Please do not take any break through or short acting pain medications for 8 hours after the block.  Continue to take all routine medications.  Our office may have instructed you to hold some medications.  You may resume _______________________________________________.    If you have any problems specifically related to your procedure, please call our office at [phone]. Problems not related to your procedure should be directed at your primary care physician.    Cervical Radiofrequency Ablation   WHAT YOU NEED TO KNOW:   What do I need to know about  cervicalradiofrequency ablation?  cervicalradiofrequency ablation (RFA) is a procedure used to treat facet joint pain in your  neck. Facet joints are found at the back of each vertebra. A needle electrode is used to send electrical currents to the nerves in your facet joint. The electrical currents create heat that damages the nerve so it cannot send pain signals.   How do I prepare for cervical RFA?  Your healthcare provider will talk to you about how to prepare for this procedure. He may tell you not to eat or drink anything after midnight  on the day of your procedure. He will tell you what medicines to take or not take on the day of your procedure.  What will happen during cervical RFA?   You will lie on your stomach. You will be given local anesthesia to numb the area of your  Neck where the needle electrode will be inserted. You may be given a sedative to help keep you relaxed. You may still feel pressure or pushing during the procedure, but you should not feel any pain. Your healthcare provider will use fluoroscopy (a type of x-ray) to guide the needle electrode to the nerves near your facet joint.     Your healthcare provider may touch the affected nerve to make sure the needle electrode is in the right place. You will feel tingling or pressure when he does this. He will then apply local anesthesia to the nerve to numb it. This will prevent you from feeling pain when he applies heat to the nerve. Your healthcare provider will then apply heat to the nerve using the needle electrode. He may need to apply heat to more than one nerve. He will remove the needle electrode and apply a bandage over the area.    What are the risks of  cervical RFA?  You may have pain, numbness, tingling, or burning in the area where the lumbar RFA was done. These normally go away within 6 weeks. The needle electrode may injure your spinal nerves. This may cause permanent arm weakness or nerve pain.  CARE AGREEMENT:   You have the right to help plan your care. Learn about your health condition and how it may be treated. Discuss treatment options with your healthcare providers to decide what care you want to receive. You always have the right to refuse treatment. The above information is an  only. It is not intended as medical advice for individual conditions or treatments. Talk to your doctor, nurse or pharmacist before following any medical regimen to see if it is safe and effective for you.  © Copyright [employer] 2020 Information is for End User's  use only and may not be sold, redistributed or otherwise used for commercial purposes. All illustrations and images included in CareNotes® are the copyrighted property of [email or web address] or [employer]

## 2025-06-26 ENCOUNTER — OFFICE VISIT (OUTPATIENT)
Dept: PHYSICAL THERAPY | Facility: CLINIC | Age: 75
End: 2025-06-26
Attending: ANESTHESIOLOGY
Payer: COMMERCIAL

## 2025-06-26 DIAGNOSIS — M47.816 SPONDYLOSIS OF LUMBAR SPINE: Primary | ICD-10-CM

## 2025-06-26 DIAGNOSIS — M47.812 SPONDYLOSIS OF CERVICAL SPINE: ICD-10-CM

## 2025-06-26 PROCEDURE — 97110 THERAPEUTIC EXERCISES: CPT

## 2025-06-26 NOTE — PROGRESS NOTES
"Daily Note     Today's date: 2025  Patient name: Chavo Reyes  : 1950  MRN: [id number]  Referring provider: Orlando Warren MD  Dx:   Encounter Diagnosis     ICD-10-CM    1. Spondylosis of lumbar spine  M47.816       2. Spondylosis of cervical spine  M47.812           Start Time: 1345  Stop Time: 1430  Total time in clinic (min): 45 minutes    Subjective: Pt states he got 6 shots in his neck yesterday and is in a lot of pain and stiff.       Objective: See treatment diary below      Assessment: Pt declined to perform any ex's for there cervical spine due to increased pain. Pt responded well to LAD to bilateral LE's. Ex's were limited due to increased low back pain.     Plan: Continue per plan of care.      Precautions: N/A    Daily Treatment Diary:      Initial Evaluation Date:   Compliance 5/20 5/28 6/2 6/3 6/10 6/12 6/26      Visit Number 1 2 3 4 5 6 7      Re-Eval              Foto Captured Y    Y               5/20 5/28 6/2 6/3 6/10 6/12 6/26      Manual             Cerv Traction, STM, BL LAD  Not tolerated well 10' AT 10' AT gentle cervical STM, gentle BL LAD 10' AT C/S STM 10m AS 10' AT 10' AT 10' LAD LT                                Ther-Ex             Scap Ret  10x5\" 10x 5\" 20x 5\" 20x 5\" 20x 5\" 20x 5\" NT      Seated lumbar FLX 10x5\" 10x5\" 10x 5\"          NuStep  L3 4' stopped due to knee pain           Cervical AROM  20x ROT, FLX EXT, SB 20x ROT, FLX, EXT, SB 20x ea rot/SB/ext 20x ROT, FLX, EXT, SB 20x ROT, FLX, EXT, SB NT      LTR    10x 10x  10x 20x5\"      SKTC c towel    Attempted          Seated HS stretch c stool    10\"x5 10\"x5 10\"x5 10x 5\"      Supine March     10x  10x  10x                                                                                    Neuro Re-Ed             TAC 20x 5\" D/C     10x       Chin Tuck 20x5\" 20x 5\" 20x5\" 3\"x20 20x 5\" 20x 5\" NT      UEB   3' ea 3'/3' 3' ea  3' ea NT      Wall Push Up      10x 5\" NT                                                    " "      Ther-Act             Pulleys FLX  3'  3'   3' 3' NT      Ball roll  10x 5\" FWD 10x 5\" FWD 10x 5\" fwd 10x 5\" FWD 10x 5\" FWD NT                                 Modalities                              MHP L/S and C/S 10m post   MHP lumbar 10 min pre                                                       "

## 2025-06-28 ENCOUNTER — OFFICE VISIT (OUTPATIENT)
Dept: URGENT CARE | Facility: CLINIC | Age: 75
End: 2025-06-28
Payer: COMMERCIAL

## 2025-06-28 VITALS
RESPIRATION RATE: 16 BRPM | OXYGEN SATURATION: 95 % | SYSTOLIC BLOOD PRESSURE: 136 MMHG | HEIGHT: 70 IN | HEART RATE: 67 BPM | WEIGHT: 198 LBS | DIASTOLIC BLOOD PRESSURE: 70 MMHG | BODY MASS INDEX: 28.35 KG/M2 | TEMPERATURE: 96.9 F

## 2025-06-28 DIAGNOSIS — S61.412A LACERATION OF LEFT HAND WITHOUT FOREIGN BODY, INITIAL ENCOUNTER: Primary | ICD-10-CM

## 2025-06-28 PROCEDURE — 12001 RPR S/N/AX/GEN/TRNK 2.5CM/<: CPT

## 2025-06-28 PROCEDURE — S9088 SERVICES PROVIDED IN URGENT: HCPCS

## 2025-06-28 PROCEDURE — 99214 OFFICE O/P EST MOD 30 MIN: CPT

## 2025-06-28 NOTE — PROGRESS NOTES
[facility]        NAME: Chavo Reyes is a 74 y.o. male  : 1950    MRN: [id number]  DATE: 2025  TIME: 2:50 PM    Assessment and Plan   Laceration of left hand without foreign body, initial encounter [S61.412A]  1. Laceration of left hand without foreign body, initial encounter  Laceration repair        3 sutures. Tolerated well. Standard wound care. Monitor for signs of infection. Tdap UTD. Suture removal in 14-21 days. Encouraged continued supportive measures.  Follow up with PCP in 3-5 days or proceed to emergency department for worsening symptoms.  Patient verbalized understanding of instructions given.       Patient Instructions     Suture removal in 14-21 days  Keep clean and dry  Do not submerge hand in water  Monitor for signs of infection  Follow up with PCP in 3-5 days.  Proceed to  ER if symptoms worsen.    If tests have been performed at [facility], our office will contact you with results if changes need to be made to the care plan discussed with you at the visit.  You can review your full results on [facility] MyChart.    Chief Complaint     Chief Complaint   Patient presents with    hand laceration     Left hand laceration - cut on metal car hall about an hour ago   Washed with soap and water - used peroxide and triple antibiotic ointment with band aid   Last tetanus            History of Present Illness       74-year-old male with a past medical history significant for hypertension presents with complaints of hand laceration.  Patient reports approximately 1 hour ago sustaining laceration to palm of left hand from metal car hall.  States washed wound with soap and water and also used hydrogen peroxide and applied bandage as well as antibiotic ointment.  No active bleeding.  Denies fever, chills, or flulike symptoms.  No numbness, tingling, or weakness.  No blood thinners.  Tdap up-to-date, .        Review of Systems   Review of Systems   Constitutional:  Negative  "for chills and fever.   Respiratory:  Negative for cough, shortness of breath and wheezing.    Cardiovascular:  Negative for chest pain.   Gastrointestinal:  Negative for abdominal pain, diarrhea, nausea and vomiting.   Musculoskeletal:  Negative for arthralgias and myalgias.   Skin:  Positive for wound.   Neurological:  Negative for weakness and numbness.         Current Medications     Current Medications[1]    Current Allergies     Allergies as of 06/28/2025 - Reviewed 06/28/2025   Allergen Reaction Noted    Ibuprofen Anaphylaxis 09/29/2018            The following portions of the patient's history were reviewed and updated as appropriate: allergies, current medications, past family history, past medical history, past social history, past surgical history and problem list.     Past Medical History[2]    Past Surgical History[3]    Family History[4]      Medications have been verified.        Objective   /70   Pulse 67   Temp (!) 96.9 °F (36.1 °C)   Resp 16   Ht 5' 10\" (1.778 m)   Wt 89.8 kg (198 lb)   SpO2 95%   BMI 28.41 kg/m²   No LMP for male patient.       Physical Exam     Physical Exam  Vitals and nursing note reviewed.   Constitutional:       General: He is not in acute distress.     Appearance: He is not toxic-appearing.   HENT:      Head: Normocephalic.     Eyes:      Conjunctiva/sclera: Conjunctivae normal.     Pulmonary:      Effort: Pulmonary effort is normal.     Musculoskeletal:         General: Normal range of motion.     Skin:     General: Skin is warm and dry.      Findings: Laceration present.      Comments: 1 cm curvilinear laceration to palm of left hand, lateral aspect without active bleeding or contamination      Neurological:      Mental Status: He is alert and oriented to person, place, and time.      Sensory: Sensation is intact.      Motor: Motor function is intact.      Gait: Gait is intact.     Psychiatric:         Mood and Affect: Mood normal.         Behavior: Behavior " "normal.         Universal Protocol:  procedure performed by consultantConsent: Verbal consent obtained  Risks and benefits: risks, benefits and alternatives were discussed  Consent given by: patient  Time out: Immediately prior to procedure a \"time out\" was called to verify the correct patient, procedure, equipment, support staff and site/side marked as required.  Patient understanding: patient states understanding of the procedure being performed  Patient identity confirmed: verbally with patient  Laceration repair    Date/Time: 6/28/2025 2:49 PM    Performed by: EDDA Mata  Authorized by: Lucho Palacio DO  Body area: upper extremity  Location details: left hand  Laceration length: 1 cm  Foreign bodies: no foreign bodies  Tendon involvement: none  Nerve involvement: none  Anesthesia: local infiltration    Anesthesia:  Local Anesthetic: lidocaine 1% with epinephrine  Anesthetic total: 2 mL      Procedure Details:  Irrigation solution: saline (wound cleanser)  Irrigation method: syringe  Amount of cleaning: standard  Skin closure: 4-0 nylon  Number of sutures: 3  Approximation: close  Approximation difficulty: simple  Dressing: 4x4 sterile gauze and antibiotic ointment  Patient tolerance: patient tolerated the procedure well with no immediate complications                         [1]   Current Outpatient Medications:     amLODIPine (NORVASC) 5 mg tablet, 5 mg in the morning., Disp: , Rfl:     aspirin 325 mg tablet, Take 325 mg by mouth in the morning., Disp: , Rfl:     atorvastatin (LIPITOR) 40 mg tablet, Take by mouth in the morning., Disp: , Rfl:     B Complex Vitamins (Vitamin B Complex) TABS, Take 1 tablet by mouth in the morning., Disp: , Rfl:     doxazosin (CARDURA) 4 mg tablet, daily at bedtime, Disp: , Rfl:     gabapentin (NEURONTIN) 600 MG tablet, 600 mg in the morning and 600 mg in the evening., Disp: , Rfl:     loratadine (CLARITIN) 10 mg tablet, , Disp: , Rfl:     metoprolol succinate " (TOPROL-XL) 50 mg 24 hr tablet, in the morning., Disp: , Rfl:     Multiple Vitamins-Iron TABS, Take by mouth, Disp: , Rfl:     Olopatadine HCl (Pataday) 0.7 % SOLN, Apply 1 drop to eye in the morning. Both eyes., Disp: , Rfl:     omeprazole (PriLOSEC) 40 MG capsule, in the morning., Disp: , Rfl:     Potassium 99 MG TABS, Take 99 mg by mouth in the morning., Disp: , Rfl:     tadalafil (CIALIS) 20 MG tablet, TAKE 1 TABLET AS NEEDED FOR ERECTILE DYSFUNCTION. DO NOT TAKE MORE THAT 3 TIMES PER WEEK., Disp: 10 tablet, Rfl: 3    UNKNOWN TO PATIENT, , Disp: , Rfl:     albuterol (PROVENTIL HFA,VENTOLIN HFA) 90 mcg/act inhaler, 2-3 inhalations every 3-4 hours for 3 days and then as needed for cough, wheezing (Patient not taking: Reported on 5/14/2025), Disp: 18 g, Rfl: 0    nitroglycerin (NITROSTAT) 0.4 mg SL tablet, , Disp: , Rfl:   [2]   Past Medical History:  Diagnosis Date    BPH (benign prostatic hyperplasia)     Family history of malignant hyperthermia     GERD (gastroesophageal reflux disease)     Heart problem     Hyperlipidemia     Hypertension     Neuropathy     Seasonal allergies     TIA (transient ischemic attack)    [3]   Past Surgical History:  Procedure Laterality Date    APPENDECTOMY      CAROTID ARTERY ANGIOPLASTY      CARPAL TUNNEL RELEASE      COLONOSCOPY      IR SPINE AND PAIN PROCEDURE  3/25/2025    IR SPINE AND PAIN PROCEDURE  4/8/2025    IR SPINE AND PAIN PROCEDURE  6/24/2025    WI LAPAROSCOPY SURG RPR INITIAL INGUINAL HERNIA N/A 5/23/2023    Procedure: RIGHT ROBOTIC ASSISTED LAPAROSCOPIC INGUINAL HERNIA REPAIR, POSSIBLE LEFT;  Surgeon: Orquidea Fritz DO;  Location: [facility];  Service: General    WI PLASTIC OPERATION PENIS INJURY N/A 8/11/2024    Procedure: REPAIR PENILE FRACTURE/RUPTURE;  Surgeon: Johnny Mann MD;  Location: [facility];  Service: Urology   [4] No family history on file.

## 2025-06-28 NOTE — PATIENT INSTRUCTIONS
Suture removal in 14-21 days  Keep clean and dry  Do not submerge hand in water  Monitor for signs of infection  Follow up with PCP in 3-5 days.  Proceed to  ER if symptoms worsen.    If tests have been performed at [facility], our office will contact you with results if changes need to be made to the care plan discussed with you at the visit.  You can review your full results on [facility] MyChart.

## 2025-07-01 ENCOUNTER — OFFICE VISIT (OUTPATIENT)
Dept: PHYSICAL THERAPY | Facility: CLINIC | Age: 75
End: 2025-07-01
Attending: ANESTHESIOLOGY
Payer: COMMERCIAL

## 2025-07-01 DIAGNOSIS — M47.812 SPONDYLOSIS OF CERVICAL SPINE: ICD-10-CM

## 2025-07-01 DIAGNOSIS — M47.816 SPONDYLOSIS OF LUMBAR SPINE: Primary | ICD-10-CM

## 2025-07-01 PROCEDURE — 97140 MANUAL THERAPY 1/> REGIONS: CPT

## 2025-07-01 PROCEDURE — 97110 THERAPEUTIC EXERCISES: CPT

## 2025-07-01 PROCEDURE — 97112 NEUROMUSCULAR REEDUCATION: CPT
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
## 2025-07-01 NOTE — PROGRESS NOTES
"Daily Note     Today's date: 2025  Patient name: Chavo Reyes  : 1950  MRN: [id number]  Referring provider: Orlando Warren MD  Dx:   Encounter Diagnosis     ICD-10-CM    1. Spondylosis of lumbar spine  M47.816       2. Spondylosis of cervical spine  M47.812           Start Time: 1345  Stop Time: 1435  Total time in clinic (min): 50 minutes    Subjective: Pt continues to note increased low back pain and neck pain. Pt states he fell over the weekend putting oil in his car. Pt got 3 stiches in his left hand.       Objective: See treatment diary below      Assessment: Pt had fair tolerance to today's tx. Pt noted increased pain in left upper trap with STM. Pt demonstrates limited hamstring, lumbar and cervical mobility with ex's. Pt would continue to benefit from skilled PT to improve pain, strength and mobility. Pt was unable to perform some cervical ex's due to injury over the weekend.     Plan: Continue per plan of care.      Precautions: N/A    Daily Treatment Diary:      Initial Evaluation Date:   Compliance 5/20 5/28 6/2 6/3 6/10 6/12 6/26 7/1     Visit Number 1 2 3 4 5 6 7 8     Re-Eval              Foto Captured Y    Y               5/20 5/28 6/2 6/3 6/10 6/12 6/26 7/1     Manual             Cerv Traction, STM, BL LAD  Not tolerated well 10' AT 10' AT gentle cervical STM, gentle BL LAD 10' AT C/S STM 10m AS 10' AT 10' AT 10' LAD LT 10' LT                               Ther-Ex             Scap Ret  10x5\" 10x 5\" 20x 5\" 20x 5\" 20x 5\" 20x 5\" NT 20x5\"     Seated lumbar FLX 10x5\" 10x5\" 10x 5\"          NuStep  L3 4' stopped due to knee pain           Cervical AROM  20x ROT, FLX EXT, SB 20x ROT, FLX, EXT, SB 20x ea rot/SB/ext 20x ROT, FLX, EXT, SB 20x ROT, FLX, EXT, SB NT 20x ea     LTR    10x 10x  10x 20x5\" 20x5\"     SKTC c towel    Attempted          Seated HS stretch c stool    10\"x5 10\"x5 10\"x5 10x 5\" 3x30\" ea     Supine March     10x  10x  10x                                                    " "                                Neuro Re-Ed             TAC 20x 5\" D/C     10x  10x     Chin Tuck 20x5\" 20x 5\" 20x5\" 3\"x20 20x 5\" 20x 5\" NT 20x5\"     UEB   3' ea 3'/3' 3' ea  3' ea NT      Wall Push Up      10x 5\" NT                                                          Ther-Act             Pulleys FLX  3'  3'   3' 3' NT      Ball roll  10x 5\" FWD 10x 5\" FWD 10x 5\" fwd 10x 5\" FWD 10x 5\" FWD NT 10x5\" FWD                                Modalities                              MHP L/S and C/S 10m post   MHP lumbar 10 min pre                                                         "

## 2025-07-02 ENCOUNTER — PREP FOR PROCEDURE (OUTPATIENT)
Dept: PAIN MEDICINE | Facility: CLINIC | Age: 75
End: 2025-07-02

## 2025-07-02 ENCOUNTER — OFFICE VISIT (OUTPATIENT)
Dept: PAIN MEDICINE | Facility: CLINIC | Age: 75
End: 2025-07-02
Payer: COMMERCIAL

## 2025-07-02 VITALS — HEIGHT: 70 IN | WEIGHT: 207 LBS | BODY MASS INDEX: 29.63 KG/M2

## 2025-07-02 DIAGNOSIS — M47.812 CERVICAL SPONDYLOSIS: Primary | ICD-10-CM

## 2025-07-02 PROCEDURE — 99214 OFFICE O/P EST MOD 30 MIN: CPT | Performed by: ANESTHESIOLOGY

## 2025-07-03 ENCOUNTER — OFFICE VISIT (OUTPATIENT)
Dept: PHYSICAL THERAPY | Facility: CLINIC | Age: 75
End: 2025-07-03
Attending: ANESTHESIOLOGY
Payer: COMMERCIAL

## 2025-07-03 DIAGNOSIS — M47.812 SPONDYLOSIS OF CERVICAL SPINE: ICD-10-CM

## 2025-07-03 DIAGNOSIS — M47.816 SPONDYLOSIS OF LUMBAR SPINE: Primary | ICD-10-CM

## 2025-07-03 PROCEDURE — 97140 MANUAL THERAPY 1/> REGIONS: CPT

## 2025-07-03 PROCEDURE — 97112 NEUROMUSCULAR REEDUCATION: CPT

## 2025-07-03 PROCEDURE — 97110 THERAPEUTIC EXERCISES: CPT

## 2025-07-03 NOTE — PROGRESS NOTES
"Daily Note     Today's date: 7/3/2025  Patient name: Chavo Reyes  : 1950  MRN: [id number]  Referring provider: Orlando Warren MD  Dx:   Encounter Diagnosis     ICD-10-CM    1. Spondylosis of lumbar spine  M47.816       2. Spondylosis of cervical spine  M47.812           Start Time: 1300  Stop Time: 1345  Total time in clinic (min): 45 minutes    Subjective: Patient reports his back is so-so today.      Objective: See treatment diary below      Assessment: Tolerated treatment well. Patient would benefit from continued PT.  PT performed manual therapy consisting of cervical traction and BL LAD to improve mobility and decrease pain in neck and back.  Exercises were continued with what patient can do with new hand injury.  Patient had good response and tolerated manual therapy better.  Patient would benefit from skilled PT to address functional deficits and pain.       Plan: Continue per plan of care.  Progress treatment as tolerated.       Precautions: N/A       Daily Treatment Diary:      Initial Evaluation Date: POC   Compliance 5/20 5/28 6/2 6/3 6/10 6/12 6/26 7/1 RE 7/3    Visit Number 1 2 3 4 5 6 7 8 9    Re-Eval         Y     Foto Captured Y    Y               5/20 5/28 6/2 6/3 6/10 6/12 6/26 7/1 7/3    Manual             Cerv Traction, STM, BL LAD  Not tolerated well 10' AT 10' AT gentle cervical STM, gentle BL LAD 10' AT C/S STM 10m AS 10' AT 10' AT 10' LAD LT 10' LT 10' AT                              Ther-Ex             Scap Ret  10x5\" 10x 5\" 20x 5\" 20x 5\" 20x 5\" 20x 5\" NT 20x5\" 20x5\"    Seated lumbar FLX 10x5\" 10x5\" 10x 5\"          NuStep  L3 4' stopped due to knee pain           Cervical AROM  20x ROT, FLX EXT, SB 20x ROT, FLX, EXT, SB 20x ea rot/SB/ext 20x ROT, FLX, EXT, SB 20x ROT, FLX, EXT, SB NT 20x ea 20x ea     LTR    10x 10x  10x 20x5\" 20x5\" 20x 5\"    SKTC c towel    Attempted          Seated HS stretch c stool    10\"x5 10\"x5 10\"x5 10x 5\" 3x30\" ea 3x30\" ea         " "10x  10x  10x  10x                                                                                  Neuro Re-Ed             TAC 20x 5\" D/C     10x  10x 10x    Chin Tuck 20x5\" 20x 5\" 20x5\" 3\"x20 20x 5\" 20x 5\" NT 20x5\" 20x5\"    UEB   3' ea 3'/3' 3' ea  3' ea NT      Wall Push Up      10x 5\" NT                                                          Ther-Act             Pulleys FLX  3'  3'   3' 3' NT      Ball roll  10x 5\" FWD 10x 5\" FWD 10x 5\" fwd 10x 5\" FWD 10x 5\" FWD NT 10x5\" FWD 10x5\" FWD                               Modalities                              MHP L/S and C/S 10m post   MHP lumbar 10 min pre MHP lumbar 10 min pre MHP lumbar 10 min pre                                                   "

## 2025-07-03 NOTE — PATIENT INSTRUCTIONS
Patient Education     Neck Pain Exercises   About this topic   The neck or cervical spine has 7 spinal bones that run from the base of your skull to the upper back. These spinal bones have discs in between them. Discs act as shock absorbers. Ligaments are strong bands of tissue that hold the bones together. Many muscles surround and attach on these bones. Nerves come off of the spinal cord and exit out of small spaces in between the spinal bones. You can have neck pain if any of these are injured or damaged. Exercises may help to make this problem better.  General   Before starting with a program, ask your doctor if you are healthy enough to do these exercises. Your doctor may have you work with a  or physical therapist to make a safe exercise program to meet your needs. You should not do the exercises if they cause sharp pains, if you feel dizzy, or if you have vision changes.  Stretching Exercises   Stretching exercises keep your muscles flexible. They also stop them from getting tight. Start by doing each of these stretches 2 to 3 times. In order for your body to make changes, you will need to hold these stretches for 20 to 30 seconds. Try to do the stretches 2 to 3 times each day. Do all exercises slowly.  Passive neck stretches:  Put your left hand on top of your head. Your other arm can be at your side or behind your back. Pull your head toward your left shoulder until you feel a gentle stretch on the right side of your neck. Repeat on the other side using your other hand.  Also, try this stretch by pulling in a diagonal direction. With your left hand on top of your head, pull your head down towards the direction of your left knee. You should feel this stretch toward the back on the right side of your neck. Repeat on the other side.  Active neck stretches:  Neck front-to-back motion ? Look down to the floor until you feel a stretch in the back of your neck. Hold. Next, look up to the ceiling until you  feel a stretch in the front of your neck. Hold.  Neck side-to-side motion ? Tilt your head to the side and bring your ear to your shoulder until you feel a stretch on the other side of your neck. Hold. Next, tilt your head to the other side until you feel a stretch. Hold.  Neck turning ? Turn only your head and look over your left shoulder until you feel stretching in the right side of your neck. Hold. Now turn only your head and look over your right shoulder until you feel a stretch in the left side of your neck. Hold.  Scalene stretches ? Grasp your head with the hand opposite the side you want to stretch. Pull your head to the side until you feel a stretch. Now, slowly turn your head so your chin is pointed upwards.  Chin tucks ? Stand straight or lie down on your back. Tuck your chin in and lengthen the back of your neck. Return to the starting position and repeat. It may help to stand up against a wall during this exercise. Try gently pushing your chin with two fingers while trying to flatten your neck against the wall. If you do this exercise lying down, try using a small rolled up washcloth under your neck. Push down into the washcloth when tucking in your chin.  Strengthening Exercises   Strengthening exercises keep your muscles firm and strong. Start by repeating each exercise 2 to 3 times. Work up to doing each exercise 10 times. Try to do the exercises 2 to 3 times each day. Hold each exercise for 3 to 5 seconds. Do all exercises slowly.  Shoulder blade squeezes ? Pinch your shoulder blades together on your upper back and hold 3 to 5 seconds. Relax.             What will the results be?   Less pain and stiffness  Better range of motion  Increased strength  Help you heal faster after an injury or surgery  Increase blood flow to a body part  Help you feel better and more relaxed  Give you more energy  More toned looking muscles  Better posture  Easier to do daily activities  Helpful tips   Stay active and  work out to keep your muscles strong and flexible.  Be sure you do not hold your breath when exercising. This can raise your blood pressure. If you tend to hold your breath, try counting out loud when exercising. If any exercise bothers you, stop right away.  Try swinging your arms at an easy pace for a few minutes to warm up your muscles. Do this again after exercising.  Doing exercises before a meal may be a good way to get into a routine.  Exercise may be slightly uncomfortable, but you should not have sharp pains. If you do get sharp pains, stop what you are doing. If the sharp pains continue, call your doctor.  Last Reviewed Date   2020-03-10  Consumer Information Use and Disclaimer   This generalized information is a limited summary of diagnosis, treatment, and/or medication information. It is not meant to be comprehensive and should be used as a tool to help the user understand and/or assess potential diagnostic and treatment options. It does NOT include all information about conditions, treatments, medications, side effects, or risks that may apply to a specific patient. It is not intended to be medical advice or a substitute for the medical advice, diagnosis, or treatment of a health care provider based on the health care provider's examination and assessment of a patient’s specific and unique circumstances. Patients must speak with a health care provider for complete information about their health, medical questions, and treatment options, including any risks or benefits regarding use of medications. This information does not endorse any treatments or medications as safe, effective, or approved for treating a specific patient. [employer] and its affiliates disclaim any warranty or liability relating to this information or the use thereof. The use of this information is governed by the Terms of Use, available at https://www.woltersBilldeskuwer.com/en/know/clinical-effectiveness-terms   Copyright   Copyright ©  2024 [employer] and its affiliates and/or licensors. All rights reserved.

## 2025-07-03 NOTE — PROGRESS NOTES
Name: Chavo Reyes      : 1950      MRN: [id number]  Encounter Provider: Orlando Warren MD  Encounter Date: 2025   Encounter department: [facility] [place]  :  Assessment & Plan      Greater than 90% relief of pain with improved ability to participate with IADLs after bilateral C3, C4, C5 medial branch blocks #1 thus far. Patient has been participant with formal PT with modest benefit and notes at least 30% relief of pain and improved function taking gabapentin without side effects. Previously reported the following symptomatology:     -bilateral c3, c4, c5 medial branch blocks #2  -has been participant with formal physical therapy for cervical and lumbar facet arthropathy; Physician directed home exercise plan as per [employer] demonstrated and handouts provided that patient plans to participate with for 1 hour, twice a week for the next 6 weeks.     Complete risks and benefits including bleeding, infection, tissue reaction, nerve injury and allergic reaction were discussed. The approach was demonstrated using models and literature was provided. Verbal and written consent was obtained.    My impressions and treatment recommendations were discussed in detail with the patient who verbalized understanding and had no further questions.  Discharge instructions were provided. I personally saw and examined the patient and I agree with the above discussed plan of care.    History of Present Illness     Chavo Reyes is a 74 y.o. male who presents for a follow up office visit in regards to Follow-up. The patient’s current symptoms include presenting with progressive neck pain described primarily as arthritic in nature. He describes predominantly aching, nagging, indolent crampy, stabbing axial neck pain without radicular features of electric shock-like and shooting pain in his upper extremities nor any pain in any specific dermatomal pattern. The pain is not accompanied by  "any pain limited weakness numbness or paresthesias.  The pain is 8/10 nature and is worse with overhead maneuvers as well as lateral rotation and extension of the neck.  The patient has been to physical therapy, and has failed conservative medical management including nsaids, tylenol and gabapentin. The pain is significant source of disability and compromises independent activities of daily living as well as overall function.  The patient has failed cervical epidural steroid injections and had at least 80% relief for approximately 4 days with recent cervical medial branch blocks. He denies any saddle anesthesia, gait abnormality or bowel/baldder abnormality; He notes headaches at times extending to his occipital bone bilaterally.     Current pain medications includes:  gabapentin.  The patient reports that this regimen is providing at least 30% pain relief.  The patient is reporting sleepiness from this pain medication regimen.    Review of Systems   Constitutional:  Positive for activity change.   HENT: Negative.     Eyes: Negative.    Respiratory: Negative.     Cardiovascular: Negative.    Gastrointestinal: Negative.    Endocrine: Negative.    Genitourinary: Negative.    Musculoskeletal:  Positive for arthralgias, myalgias, neck pain and neck stiffness. Negative for back pain and gait problem.   Skin: Negative.    Allergic/Immunologic: Negative.    Neurological:  Negative for weakness and numbness.   Hematological: Negative.    Psychiatric/Behavioral: Negative.     All other systems reviewed and are negative.      Medical History Reviewed by provider this encounter:  Tobacco  Allergies  Meds  Problems  Med Hx  Surg Hx  Fam Hx     .    Objective   Ht 5' 10\" (1.778 m)   Wt 93.9 kg (207 lb)   BMI 29.70 kg/m²         Physical Exam    Constitutional: normal, well developed, well nourished, alert, in no distress and non-toxic and no overt pain behavior.  Psychiatric:Mood and affect " appropriate  Neurologic:Cranial Nerves II-XII grossly intact Sensation grossly intact; no clonus negative valenzuela's. Reflexes 2+ and brisk. Spurling's maneuver neg  Musculoskeletal:normal gait. 5/5 strength bilaterally with AROM in upper extremities. Significant pain with cervical facet loading bilaterally and with lateral spine rotation, ttp over cervical. Negative bridget's test, negative gaenslen's negative SIJ loading bilaterally.    Review of external notes including any available PT notes, PCP notes and specialist notes was performed at this visit in addition to review of new ordered imaging and past imaging to develop or modify multidisciplinary pain plan    Radiology Results Review: I personally reviewed the following imaging studies: MRI cervical spine. My interpretation is spondylosis that is moderate in nature especially in upper cervical spine levels with mild areas of central and foraminal stenosis      Administrative Statements   I have spent a total time of 30 minutes in caring for this patient on the day of the visit/encounter including Diagnostic results, Prognosis, Risks and benefits of tx options, Instructions for management, Patient and family education, Importance of tx compliance, Risk factor reductions, Impressions, Counseling / Coordination of care, Documenting in the medical record, Reviewing/placing orders in the medical record (including tests, medications, and/or procedures), Obtaining or reviewing history  , and Communicating with other healthcare professionals .

## 2025-07-08 ENCOUNTER — OFFICE VISIT (OUTPATIENT)
Dept: PHYSICAL THERAPY | Facility: CLINIC | Age: 75
End: 2025-07-08
Attending: ANESTHESIOLOGY
Payer: COMMERCIAL

## 2025-07-08 DIAGNOSIS — M47.816 SPONDYLOSIS OF LUMBAR SPINE: Primary | ICD-10-CM

## 2025-07-08 DIAGNOSIS — M47.812 SPONDYLOSIS OF CERVICAL SPINE: ICD-10-CM

## 2025-07-08 PROCEDURE — 97140 MANUAL THERAPY 1/> REGIONS: CPT

## 2025-07-08 PROCEDURE — 97112 NEUROMUSCULAR REEDUCATION: CPT

## 2025-07-08 PROCEDURE — 97110 THERAPEUTIC EXERCISES: CPT

## 2025-07-08 PROCEDURE — 97530 THERAPEUTIC ACTIVITIES: CPT

## 2025-07-08 NOTE — PROGRESS NOTES
"  Start Time: 1345  Stop Time: 1440  Total time in clinic (min): 55 minutes  Daily Note     Today's date: 2025  Patient name: Chavo Reyes  : 1950  MRN: [id number]  Referring provider: Orlando Warren MD  Dx:   Encounter Diagnosis     ICD-10-CM    1. Spondylosis of lumbar spine  M47.816       2. Spondylosis of cervical spine  M47.812           Start Time: 1345  Stop Time: 1440  Total time in clinic (min): 55 minutes    Subjective: Patient reports increased pain in his back and neck.       Objective: See treatment diary below      Assessment: Pt had fair tolerance to todays tx. Pt noted increased pain with STM to bilateral upper traps. Initiated spine abduction/adduction to improve core stability. Pt noted increased low back pain with ex progression but was able to complete ex's. Pr required frequent rest breaks due to increased pain.       Plan: Continue per plan of care.  Progress treatment as tolerated.       Precautions: N/A       Daily Treatment Diary:      Initial Evaluation Date: POC   Compliance 5/20 5/28 6/2 6/3 6/10 6/12 6/26 7/1 RE 7/3 7/8   Visit Number 1 2 3 4 5 6 7 8 9 10   Re-Eval         Y     Foto Captured Y    Y     Y          5/20 5/28 6/2 6/3 6/10 6/12 6/26 7/1 7/3 7/8   Manual             Cerv Traction, STM, BL LAD  Not tolerated well 10' AT 10' AT gentle cervical STM, gentle BL LAD 10' AT C/S STM 10m AS 10' AT 10' AT 10' LAD LT 10' LT 10' AT 10' LT                             Ther-Ex             Scap Ret  10x5\" 10x 5\" 20x 5\" 20x 5\" 20x 5\" 20x 5\" NT 20x5\" 20x5\" 20x5\"   Seated lumbar FLX 10x5\" 10x5\" 10x 5\"          NuStep  L3 4' stopped due to knee pain           Cervical AROM  20x ROT, FLX EXT, SB 20x ROT, FLX, EXT, SB 20x ea rot/SB/ext 20x ROT, FLX, EXT, SB 20x ROT, FLX, EXT, SB NT 20x ea 20x ea  20x ea   LTR    10x 10x  10x 20x5\" 20x5\" 20x 5\" 20x5\"   SKTC c towel    Attempted          Seated HS stretch c stool    10\"x5 10\"x5 10\"x5 10x 5\" 3x30\" ea 3x30\" ea 3x30\"   Supine " "March     10x  10x  10x  10x 20x                                                                                 Neuro Re-Ed             TAC 20x 5\" D/C     10x  10x 10x 10x   Chin Tuck 20x5\" 20x 5\" 20x5\" 3\"x20 20x 5\" 20x 5\" NT 20x5\" 20x5\" 20x5\"    UEB   3' ea 3'/3' 3' ea  3' ea NT      Wall Push Up      10x 5\" NT      Spine abd/add          Ball + orange band 10x                                           Ther-Act             Pulleys FLX  3'  3'   3' 3' NT      Ball roll  10x 5\" FWD 10x 5\" FWD 10x 5\" fwd 10x 5\" FWD 10x 5\" FWD NT 10x5\" FWD 10x5\" FWD 20x5\"  fwd                              Modalities                              MHP L/S and C/S 10m post   MHP lumbar 10 min pre MHP lumbar 10 min pre MHP lumbar 10 min pre MHP lumbar 10 min pre                                                             " OR RN OR NABOR Teresa at 8am

## 2025-07-10 ENCOUNTER — APPOINTMENT (OUTPATIENT)
Dept: PHYSICAL THERAPY | Facility: CLINIC | Age: 75
End: 2025-07-10
Attending: ANESTHESIOLOGY
Payer: COMMERCIAL

## 2025-07-15 ENCOUNTER — APPOINTMENT (OUTPATIENT)
Dept: PHYSICAL THERAPY | Facility: CLINIC | Age: 75
End: 2025-07-15
Attending: ANESTHESIOLOGY
Payer: COMMERCIAL

## 2025-07-17 ENCOUNTER — OFFICE VISIT (OUTPATIENT)
Dept: PHYSICAL THERAPY | Facility: CLINIC | Age: 75
End: 2025-07-17
Attending: ANESTHESIOLOGY
Payer: COMMERCIAL

## 2025-07-17 DIAGNOSIS — M47.812 SPONDYLOSIS OF CERVICAL SPINE: ICD-10-CM

## 2025-07-17 DIAGNOSIS — N52.9 ERECTILE DYSFUNCTION, UNSPECIFIED ERECTILE DYSFUNCTION TYPE: ICD-10-CM

## 2025-07-17 DIAGNOSIS — S39.840D FRACTURE OF CORPUS CAVERNOSUM PENIS, SUBSEQUENT ENCOUNTER: ICD-10-CM

## 2025-07-17 DIAGNOSIS — M47.816 SPONDYLOSIS OF LUMBAR SPINE: Primary | ICD-10-CM

## 2025-07-17 PROCEDURE — 97110 THERAPEUTIC EXERCISES: CPT

## 2025-07-17 PROCEDURE — 97530 THERAPEUTIC ACTIVITIES: CPT

## 2025-07-17 PROCEDURE — 97112 NEUROMUSCULAR REEDUCATION: CPT

## 2025-07-17 PROCEDURE — 97140 MANUAL THERAPY 1/> REGIONS: CPT

## 2025-07-17 RX ORDER — TADALAFIL 20 MG/1
20 TABLET ORAL DAILY PRN
Qty: 10 TABLET | Refills: 3 | Status: SHIPPED | OUTPATIENT
Start: 2025-07-17

## 2025-07-17 NOTE — TELEPHONE ENCOUNTER
Reason for call:   [x] Refill   [] Prior Auth  [] Other:     Office:   [] PCP/Provider -   [x] Specialty/Provider -     Medication:   tadalafil (CIALIS) 20 MG table    Dose/Frequency: Sig: TAKE 1 TABLET AS NEEDED FOR ERECTILE DYSFUNCTION. DO NOT TAKE MORE THAT 3 TIMES PER WEEK.      Quantity: 10    Pharmacy: [facility]   Does the patient have enough for 3 days?   [] Yes   [x] No - Send as HP to POD    Mail Away Pharmacy   Does the patient have enough for 10 days?   [] Yes   [] No - Send as HP to POD

## 2025-07-17 NOTE — PROGRESS NOTES
"Daily Note     Today's date: 2025  Patient name: Chavo Reyes  : 1950  MRN: [id number]  Referring provider: Orlando Warren MD  Dx:   Encounter Diagnosis     ICD-10-CM    1. Spondylosis of lumbar spine  M47.816       2. Spondylosis of cervical spine  M47.812           Start Time: 1345  Stop Time: 1440  Total time in clinic (min): 55 minutes    Subjective: Pt continues to note pain in his neck and back.       Objective: See treatment diary below      Assessment: Pt had fair tolerance to today's tx. Pt noted no change in symptoms after manual therapy. Pt required frequent rest breaks and fatigue through out treatment due to fatigue and increased pain. Ex's and progression are limited due to increased pain. Pt would continue to benefit from skilled PT to improve pain, mobility and strength. Will continue to progress as tolerated.       Plan: Continue per plan of care.      Precautions: N/A  Start Time: 1345  Stop Time: 1440  Total time in clinic (min): 55 minutes          Daily Treatment Diary:      Initial Evaluation Date: POC   Compliance 7/17 5/28 6/2 6/3 6/10 6/12 6/26 7/1 RE 7/3 7/8   Visit Number 11 2 3 4 5 6 7 8 9 10   Re-Eval         Y     Foto Captured Y    Y     Y          7/17 5/28 6/2 6/3 6/10 6/12 6/26 7/1 7/3 7/8   Manual             Cerv Traction, STM, BL LAD  10' LT 10' AT gentle cervical STM, gentle BL LAD 10' AT C/S STM 10m AS 10' AT 10' AT 10' LAD LT 10' LT 10' AT 10' LT                             Ther-Ex             Scap Ret  20x5\" 10x 5\" 20x 5\" 20x 5\" 20x 5\" 20x 5\" NT 20x5\" 20x5\" 20x5\"   Seated lumbar FLX - 10x5\" 10x 5\"          NuStep  L3 4' stopped due to knee pain           Cervical AROM 20x ea 20x ROT, FLX EXT, SB 20x ROT, FLX, EXT, SB 20x ea rot/SB/ext 20x ROT, FLX, EXT, SB 20x ROT, FLX, EXT, SB NT 20x ea 20x ea  20x ea   LTR 20x5\"   10x 10x  10x 20x5\" 20x5\" 20x 5\" 20x5\"   SKTC c towel    Attempted          Seated HS stretch c stool 3x 30\"    10\"x5 10\"x5 10\"x5 10x 5\" " "3x30\" ea 3x30\" ea 3x30\"   Supine March 20x     10x  10x  10x  10x 20x                                                                                 Neuro Re-Ed             TAC 20x 5\" D/C     10x  10x 10x 10x   Chin Tuck 20x5\" 20x 5\" 20x5\" 3\"x20 20x 5\" 20x 5\" NT 20x5\" 20x5\" 20x5\"    UEB   3' ea 3'/3' 3' ea  3' ea NT      Wall Push Up      10x 5\" NT      Spine abd/add Ball + orange band 10x          Ball + orange band 10x                                           Ther-Act             Pulleys FLX  3'  3'   3' 3' NT      Ball roll 20x5\"fwd 10x 5\" FWD 10x 5\" FWD 10x 5\" fwd 10x 5\" FWD 10x 5\" FWD NT 10x5\" FWD 10x5\" FWD 20x5\"  fwd                              Modalities                              MHP L/S and C/S 10m post   MHP lumbar 10 min pre MHP lumbar 10 min pre MHP lumbar 10 min pre MHP lumbar 10 min pre                                                                      "

## 2025-07-18 ENCOUNTER — OFFICE VISIT (OUTPATIENT)
Dept: URGENT CARE | Facility: CLINIC | Age: 75
End: 2025-07-18
Payer: COMMERCIAL

## 2025-07-18 VITALS
HEART RATE: 51 BPM | BODY MASS INDEX: 28.35 KG/M2 | OXYGEN SATURATION: 97 % | WEIGHT: 198 LBS | TEMPERATURE: 96.1 F | HEIGHT: 70 IN | DIASTOLIC BLOOD PRESSURE: 74 MMHG | SYSTOLIC BLOOD PRESSURE: 150 MMHG | RESPIRATION RATE: 18 BRPM

## 2025-07-18 DIAGNOSIS — Z48.02 ENCOUNTER FOR REMOVAL OF SUTURES: Primary | ICD-10-CM

## 2025-07-18 PROCEDURE — S9088 SERVICES PROVIDED IN URGENT: HCPCS | Performed by: PHYSICIAN ASSISTANT

## 2025-07-18 PROCEDURE — 99212 OFFICE O/P EST SF 10 MIN: CPT | Performed by: PHYSICIAN ASSISTANT

## 2025-07-18 NOTE — PROGRESS NOTES
[facility] Now  Name: Chavo Reyes      : 1950      MRN: [id number]  Encounter Provider: Roberta Velazquez PA-C  Encounter Date: 2025   Encounter department: [facility] NOW [facility]  :  Assessment & Plan  Encounter for removal of sutures             Patient Instructions  Keep the area clean and dry.  Watch for signs of infection.  Follow up with PCP in 3-5 days.  Proceed to  ER if symptoms worsen.    If tests are performed, our office will contact you with results only if changes need to made to the care plan discussed with you at the visit. You can review your full results on [facility].    Chief Complaint:   Chief Complaint   Patient presents with    Suture / Staple Removal     Sutures placed in left hand on ; no redness or swelling; denies fevers or drainage      History of Present Illness   Suture / Staple Removal  The sutures were placed 7 to 10 days ago. He tried nothing since the wound repair. The treatment provided significant relief. His temperature was unmeasured prior to arrival. There has been no drainage from the wound. There is no redness present. There is no swelling present. There is no pain present. He has no difficulty moving the affected extremity or digit.     History obtained from: patient    Review of Systems   Constitutional:  Negative for activity change, appetite change, chills, fatigue and fever.   Respiratory:  Negative for cough, chest tightness and shortness of breath.    Cardiovascular:  Negative for chest pain and palpitations.   Gastrointestinal:  Negative for abdominal pain, blood in stool, diarrhea, nausea and vomiting.   Musculoskeletal:  Negative for arthralgias, joint swelling and myalgias.   Skin:  Positive for wound. Negative for rash.   Neurological:  Negative for dizziness, light-headedness and headaches.     Past Medical History   Past Medical History[1]  Past Surgical History[2]  Family History[3]  he reports  "that he quit smoking about 49 years ago. His smoking use included cigarettes. He has never used smokeless tobacco. He reports current alcohol use. He reports that he does not use drugs.  Current Outpatient Medications   Medication Instructions    amLODIPine (NORVASC) 5 mg, Daily    aspirin 325 mg, Daily    atorvastatin (LIPITOR) 40 mg tablet Daily    B Complex Vitamins (Vitamin B Complex) TABS 1 tablet, Daily    doxazosin (CARDURA) 4 mg tablet Daily at bedtime    gabapentin (NEURONTIN) 600 mg, 2 times daily    loratadine (CLARITIN) 10 mg tablet No dose, route, or frequency recorded.    metoprolol succinate (TOPROL-XL) 50 mg 24 hr tablet Daily    Multiple Vitamins-Iron TABS Take by mouth    nitroglycerin (NITROSTAT) 0.4 mg SL tablet No dose, route, or frequency recorded.    Olopatadine HCl (Pataday) 0.7 % SOLN 1 drop, Daily    omeprazole (PriLOSEC) 40 MG capsule Daily    Potassium 99 mg, Daily    tadalafil (CIALIS) 20 mg, Oral, Daily PRN    UNKNOWN TO PATIENT No dose, route, or frequency recorded.   Allergies[4]     Objective   /74   Pulse (!) 51   Temp (!) 96.1 °F (35.6 °C)   Resp 18   Ht 5' 10\" (1.778 m)   Wt 89.8 kg (198 lb)   SpO2 97%   BMI 28.41 kg/m²      Physical Exam  Vitals and nursing note reviewed.   Constitutional:       General: He is not in acute distress.     Appearance: He is well-developed. He is not diaphoretic.   HENT:      Head: Normocephalic and atraumatic.     Cardiovascular:      Rate and Rhythm: Normal rate and regular rhythm.      Heart sounds: Normal heart sounds. No murmur heard.     No friction rub. No gallop.   Pulmonary:      Effort: Pulmonary effort is normal. No respiratory distress.      Breath sounds: Normal breath sounds. No wheezing or rales.   Chest:      Chest wall: No tenderness.   Lymphadenopathy:      Cervical: No cervical adenopathy.     Skin:     Findings: Laceration present.     Neurological:      Mental Status: He is alert and oriented to person, place, and " "time.     Suture removal    Date/Time: 7/18/2025 3:30 PM    Performed by: Roberta Velazquez PA-C  Authorized by: Roberta Velazquez PA-C    [place] Protocol:  procedure performed by consultantConsent: Verbal consent obtained  Consent given by: patient  Patient understanding: patient states understanding of the procedure being performed  Patient identity confirmed: verbally with patient      Patient location:  Clinic  Location:     Laterality:  Left    Location:  Upper extremity    Upper extremity location:  Hand    Hand location: palm.  Procedure details:     Tools used:  Suture removal kit    Wound appearance:  No sign(s) of infection, good wound healing, clean, moist and pink    Number of sutures removed:  2  Post-procedure details:     Post-removal:  No dressing applied    Patient tolerance of procedure:  Tolerated well, no immediate complications        Administrative Statements   I have spent a total time of 10 minutes in caring for this patient on the day of the visit/encounter including Prognosis, Risks and benefits of tx options, Documenting in the medical record, and Obtaining or reviewing history  .Portions of the record may have been created with voice recognition software.  Occasional wrong word or \"sound a like\" substitutions may have occurred due to the inherent limitations of voice recognition software.  Read the chart carefully and recognize, using context, where substitutions have occurred.       [1]   Past Medical History:  Diagnosis Date    BPH (benign prostatic hyperplasia)     Family history of malignant hyperthermia     GERD (gastroesophageal reflux disease)     Heart problem     Hyperlipidemia     Hypertension     Neuropathy     Seasonal allergies     TIA (transient ischemic attack)    [2]   Past Surgical History:  Procedure Laterality Date    APPENDECTOMY      CAROTID ARTERY ANGIOPLASTY      CARPAL TUNNEL RELEASE      COLONOSCOPY      IR SPINE AND PAIN PROCEDURE  3/25/2025    IR SPINE AND " PAIN PROCEDURE  4/8/2025    IR SPINE AND PAIN PROCEDURE  6/24/2025    MD LAPAROSCOPY SURG RPR INITIAL INGUINAL HERNIA N/A 5/23/2023    Procedure: RIGHT ROBOTIC ASSISTED LAPAROSCOPIC INGUINAL HERNIA REPAIR, POSSIBLE LEFT;  Surgeon: Orquidea Fritz DO;  Location: [facility];  Service: General    MD PLASTIC OPERATION PENIS INJURY N/A 8/11/2024    Procedure: REPAIR PENILE FRACTURE/RUPTURE;  Surgeon: Johnny Mann MD;  Location: [facility];  Service: Urology   [3] No family history on file.  [4]   Allergies  Allergen Reactions    Ibuprofen Anaphylaxis

## 2025-07-22 ENCOUNTER — OFFICE VISIT (OUTPATIENT)
Dept: PHYSICAL THERAPY | Facility: CLINIC | Age: 75
End: 2025-07-22
Attending: ANESTHESIOLOGY
Payer: COMMERCIAL

## 2025-07-22 DIAGNOSIS — M47.812 SPONDYLOSIS OF CERVICAL SPINE: ICD-10-CM

## 2025-07-22 DIAGNOSIS — M47.816 SPONDYLOSIS OF LUMBAR SPINE: Primary | ICD-10-CM

## 2025-07-22 PROCEDURE — 97140 MANUAL THERAPY 1/> REGIONS: CPT

## 2025-07-22 PROCEDURE — 97110 THERAPEUTIC EXERCISES: CPT

## 2025-07-22 PROCEDURE — 97112 NEUROMUSCULAR REEDUCATION: CPT

## 2025-07-22 NOTE — PROGRESS NOTES
"Daily Note     Today's date: 2025  Patient name: Chavo Reyes  : 1950  MRN: [id number]  Referring provider: Orlando Warren MD  Dx:   Encounter Diagnosis     ICD-10-CM    1. Spondylosis of lumbar spine  M47.816       2. Spondylosis of cervical spine  M47.812           Start Time: 1345  Stop Time: 1430  Total time in clinic (min): 45 minutes    Subjective: Pt states he hurt his low back while changing a tire on his car.       Objective: See treatment diary below      Assessment: Pt had fair tolerance to today's tx. Pt noted increased pain with cervical STM and LAD. Exercises were limited due to increased low back and cervical pain. Pt was unable to perform low trunk rotations due to increased low back pain. Pt noted moderate increase pain after todays session. Will continue to progress as tolerated.       Plan: Continue per plan of care.      Precautions: N/A    Start Time: 1345  Stop Time: 1430  Total time in clinic (min): 45 minutes          Daily Treatment Diary:      Initial Evaluation Date: POC   Compliance 7/17 7/22 6/2 6/3 6/10 6/12 6/26 7/1 RE 7/3 7/8   Visit Number [phone] 12 3 4 5 6 7 8 9 10   Re-Eval         Y     Foto Captured     Y     Y          7/17 5/28 6/2 6/3 6/10 6/12 6/26 7/1 7/3 7/8   Manual             Cerv Traction, STM, BL LAD  10' LT 10' LT 10' AT C/S STM 10m AS 10' AT 10' AT 10' LAD LT 10' LT 10' AT 10' LT                             Ther-Ex             Scap Ret  20x5\" 20x5\" 20x 5\" 20x 5\" 20x 5\" 20x 5\" NT 20x5\" 20x5\" 20x5\"   Seated lumbar FLX -  10x 5\"          NuStep  -           Cervical AROM 20x ea 20x ROT, FLX EXT, SB 20x ROT, FLX, EXT, SB 20x ea rot/SB/ext 20x ROT, FLX, EXT, SB 20x ROT, FLX, EXT, SB NT 20x ea 20x ea  20x ea   LTR 20x5\" NT increased pain  10x 10x  10x 20x5\" 20x5\" 20x 5\" 20x5\"   SKTC c towel  NT increased pain  Attempted          Seated HS stretch c stool 3x 30\"  3x30\" limited   10\"x5 10\"x5 10\"x5 10x 5\" 3x30\" ea 3x30\" ea 3x30\"   Supine March 20x  NT " "increased pain   10x  10x  10x  10x 20x                                                                                 Neuro Re-Ed             TAC 20x 5\" 20x5\"     10x  10x 10x 10x   Chin Tuck 20x5\" 20x 5\" 20x5\" 3\"x20 20x 5\" 20x 5\" NT 20x5\" 20x5\" 20x5\"    UEB   3' ea 3'/3' 3' ea  3' ea NT      Wall Push Up      10x 5\" NT      Spine abd/add Ball + orange band 10x  Add only with ball 20x         Ball + orange band 10x    ISO abs  10x5\"                                      Ther-Act             Pulleys FLX   3'   3' 3' NT      Ball roll 20x5\"fwd 10x5\" fwd increased pain  10x 5\" FWD 10x 5\" fwd 10x 5\" FWD 10x 5\" FWD NT 10x5\" FWD 10x5\" FWD 20x5\"  fwd                              Modalities                            MHP lumbar 10' pre tx  MHP L/S and C/S 10m post   MHP lumbar 10 min pre MHP lumbar 10 min pre MHP lumbar 10 min pre MHP lumbar 10 min pre                                                                                  "

## 2025-07-24 ENCOUNTER — APPOINTMENT (OUTPATIENT)
Dept: PHYSICAL THERAPY | Facility: CLINIC | Age: 75
End: 2025-07-24
Attending: ANESTHESIOLOGY
Payer: COMMERCIAL

## 2025-07-29 ENCOUNTER — OFFICE VISIT (OUTPATIENT)
Dept: URGENT CARE | Facility: CLINIC | Age: 75
End: 2025-07-29
Payer: COMMERCIAL

## 2025-07-29 ENCOUNTER — APPOINTMENT (OUTPATIENT)
Dept: PHYSICAL THERAPY | Facility: CLINIC | Age: 75
End: 2025-07-29
Attending: ANESTHESIOLOGY
Payer: COMMERCIAL

## 2025-07-29 VITALS
HEART RATE: 67 BPM | BODY MASS INDEX: 28.35 KG/M2 | TEMPERATURE: 97.2 F | DIASTOLIC BLOOD PRESSURE: 70 MMHG | WEIGHT: 198 LBS | RESPIRATION RATE: 16 BRPM | OXYGEN SATURATION: 97 % | HEIGHT: 70 IN | SYSTOLIC BLOOD PRESSURE: 118 MMHG

## 2025-07-29 DIAGNOSIS — H66.92 LEFT OTITIS MEDIA, UNSPECIFIED OTITIS MEDIA TYPE: ICD-10-CM

## 2025-07-29 DIAGNOSIS — J01.40 ACUTE NON-RECURRENT PANSINUSITIS: Primary | ICD-10-CM

## 2025-07-29 PROCEDURE — 99213 OFFICE O/P EST LOW 20 MIN: CPT

## 2025-07-29 PROCEDURE — S9088 SERVICES PROVIDED IN URGENT: HCPCS

## 2025-07-31 ENCOUNTER — APPOINTMENT (OUTPATIENT)
Dept: PHYSICAL THERAPY | Facility: CLINIC | Age: 75
End: 2025-07-31
Attending: ANESTHESIOLOGY
Payer: COMMERCIAL

## 2025-08-04 ENCOUNTER — OFFICE VISIT (OUTPATIENT)
Dept: PAIN MEDICINE | Facility: CLINIC | Age: 75
End: 2025-08-04
Payer: COMMERCIAL

## 2025-08-04 ENCOUNTER — PREP FOR PROCEDURE (OUTPATIENT)
Dept: PAIN MEDICINE | Facility: CLINIC | Age: 75
End: 2025-08-04

## 2025-08-04 VITALS — WEIGHT: 201.8 LBS | HEIGHT: 70 IN | BODY MASS INDEX: 28.89 KG/M2

## 2025-08-04 DIAGNOSIS — I24.9 ACS (ACUTE CORONARY SYNDROME) (HCC): ICD-10-CM

## 2025-08-04 DIAGNOSIS — M47.812 CERVICAL SPONDYLOSIS: Primary | ICD-10-CM

## 2025-08-04 PROCEDURE — G2211 COMPLEX E/M VISIT ADD ON: HCPCS | Performed by: ANESTHESIOLOGY

## 2025-08-04 PROCEDURE — 99213 OFFICE O/P EST LOW 20 MIN: CPT | Performed by: ANESTHESIOLOGY

## 2025-08-04 RX ORDER — NITROGLYCERIN 0.4 MG/1
0.4 TABLET SUBLINGUAL
Qty: 2 TABLET | Refills: 0 | Status: SHIPPED | OUTPATIENT
Start: 2025-08-04 | End: 2025-08-14

## 2025-08-12 ENCOUNTER — EVALUATION (OUTPATIENT)
Dept: PHYSICAL THERAPY | Facility: CLINIC | Age: 75
End: 2025-08-12
Attending: ANESTHESIOLOGY
Payer: COMMERCIAL

## 2025-08-14 ENCOUNTER — HOSPITAL ENCOUNTER (OUTPATIENT)
Dept: GASTROENTEROLOGY | Facility: HOSPITAL | Age: 75
Setting detail: OUTPATIENT SURGERY
End: 2025-08-14
Attending: ANESTHESIOLOGY
Payer: COMMERCIAL

## 2025-08-20 RX ORDER — FLUTICASONE PROPIONATE 50 MCG
SPRAY, SUSPENSION (ML) NASAL
COMMUNITY
Start: 2025-08-05

## 2025-08-22 ENCOUNTER — PREP FOR PROCEDURE (OUTPATIENT)
Dept: PAIN MEDICINE | Facility: CLINIC | Age: 75
End: 2025-08-22

## 2025-08-22 ENCOUNTER — OFFICE VISIT (OUTPATIENT)
Dept: PAIN MEDICINE | Facility: CLINIC | Age: 75
End: 2025-08-22
Payer: COMMERCIAL

## 2025-08-22 VITALS — WEIGHT: 202.8 LBS | HEIGHT: 70 IN | BODY MASS INDEX: 29.03 KG/M2

## 2025-08-22 DIAGNOSIS — M47.812 CERVICAL SPONDYLOSIS: Primary | ICD-10-CM

## 2025-08-22 PROCEDURE — G2211 COMPLEX E/M VISIT ADD ON: HCPCS | Performed by: ANESTHESIOLOGY

## 2025-08-22 PROCEDURE — 99213 OFFICE O/P EST LOW 20 MIN: CPT | Performed by: ANESTHESIOLOGY

## (undated) DEVICE — ALLENTOWN LAP CHOLE APP PACK: Brand: CARDINAL HEALTH

## (undated) DEVICE — SUT VICRYL 3-0 SH 27 IN J416H

## (undated) DEVICE — SYRINGE 10ML LL

## (undated) DEVICE — PENCIL ELECTROSURG E-Z CLEAN -0035H

## (undated) DEVICE — SURGICAL CLIPPER BLADE GENERAL USE

## (undated) DEVICE — ADHESIVE SKIN HIGH VISCOSITY EXOFIN 1ML

## (undated) DEVICE — INTENDED FOR TISSUE SEPARATION, AND OTHER PROCEDURES THAT REQUIRE A SHARP SURGICAL BLADE TO PUNCTURE OR CUT.: Brand: BARD-PARKER SAFETY BLADES SIZE 11, STERILE

## (undated) DEVICE — GLOVE INDICATOR PI UNDERGLOVE SZ 6.5 BLUE

## (undated) DEVICE — PAD GROUNDING ADULT

## (undated) DEVICE — TIP COVER ACCESSORY

## (undated) DEVICE — SUT VICRYL 2-0 SH 27 IN UNDYED J417H

## (undated) DEVICE — NEEDLE 25G X 1 1/2

## (undated) DEVICE — MAYO STAND COVER: Brand: CONVERTORS

## (undated) DEVICE — GLOVE SRG BIOGEL 6

## (undated) DEVICE — DRAPE EQUIPMENT RF WAND

## (undated) DEVICE — SCD SEQUENTIAL COMPRESSION COMFORT SLEEVE MEDIUM KNEE LENGTH: Brand: KENDALL SCD

## (undated) DEVICE — COLUMN DRAPE

## (undated) DEVICE — BETHLEHEM UNIVERSAL MINOR GEN: Brand: CARDINAL HEALTH

## (undated) DEVICE — SUT VICRYL 4-0 PS-2 27 IN J426H

## (undated) DEVICE — COBAN 4 IN STERILE

## (undated) DEVICE — STANDARD SURGICAL GOWN, L: Brand: CONVERTORS

## (undated) DEVICE — MONOPOLAR CURVED SCISSORS: Brand: ENDOWRIST

## (undated) DEVICE — CANNULA SEAL

## (undated) DEVICE — LARGE NEEDLE DRIVER: Brand: ENDOWRIST

## (undated) DEVICE — ENDOPATH PNEUMONEEDLE INSUFFLATION NEEDLES WITH LUER LOCK CONNECTORS 120MM: Brand: ENDOPATH

## (undated) DEVICE — TUBING SUCTION 5MM X 12 FT

## (undated) DEVICE — NEPTUNE E-SEP SMOKE EVACUATION PENCIL, COATED, 70MM BLADE, PUSH BUTTON SWITCH: Brand: NEPTUNE E-SEP

## (undated) DEVICE — SUT VICRYL 2-0 CT-1 27 IN J259H

## (undated) DEVICE — VISUALIZATION SYSTEM: Brand: CLEARIFY

## (undated) DEVICE — GLOVE INDICATOR PI UNDERGLOVE SZ 8 BLUE

## (undated) DEVICE — 40595 XL TRENDELENBURG POSITIONING KIT: Brand: 40595 XL TRENDELENBURG POSITIONING KIT

## (undated) DEVICE — PLUMEPEN PRO 10FT

## (undated) DEVICE — MEDI-VAC YANKAUER SUCTION HANDLE W/STRAIGHT TIP & CONTROL VENT: Brand: CARDINAL HEALTH

## (undated) DEVICE — SUT VICRYL 2-0 CT-2 27 IN J269H

## (undated) DEVICE — DECANTER: Brand: UNBRANDED

## (undated) DEVICE — FENESTRATED BIPOLAR FORCEPS: Brand: ENDOWRIST

## (undated) DEVICE — PENROSE DRAIN, 18 X 3 8: Brand: CARDINAL HEALTH

## (undated) DEVICE — POOLE SUCTION HANDLE: Brand: CARDINAL HEALTH

## (undated) DEVICE — CHLORAPREP HI-LITE 26ML ORANGE

## (undated) DEVICE — TOWEL SURG XR DETECT GREEN STRL RFD

## (undated) DEVICE — GLOVE SRG BIOGEL 7

## (undated) DEVICE — ASTOUND FABRIC REINFORCED SURGICAL GOWN: Brand: CONVERTORS

## (undated) DEVICE — SPONGE STICK WITH PVP-I: Brand: KENDALL

## (undated) DEVICE — ARM DRAPE

## (undated) DEVICE — SUT MONOCRYL 3-0 SH 27 IN Y316H

## (undated) DEVICE — TUBING INSUFFLATION SET ISO CONNECTOR

## (undated) DEVICE — STERILE LATEX POWDER-FREE SURGICAL GLOVESWITH NITRILE COATING: Brand: PROTEXIS

## (undated) DEVICE — SINGLE PORT MANIFOLD: Brand: NEPTUNE 2

## (undated) DEVICE — SUT SILK 2-0 SH 30 IN K833H

## (undated) DEVICE — SUT VICRYL 0 UR-6 27 IN J603H

## (undated) DEVICE — ELECTRO LUBE IS A SINGLE PATIENT USE DEVICE THAT IS INTENDED TO BE USED ON ELECTROSURGICAL ELECTRODES TO REDUCE STICKING.: Brand: KEY SURGICAL ELECTRO LUBE

## (undated) DEVICE — SUT CHROMIC 3-0 SH 27 IN G122H

## (undated) DEVICE — SUT VLOC 90 2-0 GS-22 12 IN VLOCM2115